# Patient Record
Sex: FEMALE | Race: WHITE | NOT HISPANIC OR LATINO | Employment: FULL TIME | URBAN - METROPOLITAN AREA
[De-identification: names, ages, dates, MRNs, and addresses within clinical notes are randomized per-mention and may not be internally consistent; named-entity substitution may affect disease eponyms.]

---

## 2017-07-19 ENCOUNTER — OFFICE VISIT (OUTPATIENT)
Dept: URGENT CARE | Facility: MEDICAL CENTER | Age: 27
End: 2017-07-19
Payer: COMMERCIAL

## 2017-07-19 PROCEDURE — 99213 OFFICE O/P EST LOW 20 MIN: CPT

## 2017-07-19 PROCEDURE — S9088 SERVICES PROVIDED IN URGENT: HCPCS

## 2017-07-31 ENCOUNTER — TRANSCRIBE ORDERS (OUTPATIENT)
Dept: LAB | Facility: HOSPITAL | Age: 27
End: 2017-07-31

## 2017-07-31 ENCOUNTER — APPOINTMENT (OUTPATIENT)
Dept: LAB | Facility: HOSPITAL | Age: 27
End: 2017-07-31
Payer: COMMERCIAL

## 2017-07-31 DIAGNOSIS — Z00.8 ENCOUNTER FOR OTHER GENERAL EXAMINATION: Primary | ICD-10-CM

## 2017-07-31 DIAGNOSIS — Z00.8 ENCOUNTER FOR OTHER GENERAL EXAMINATION: ICD-10-CM

## 2017-07-31 LAB
CHOLEST SERPL-MCNC: 175 MG/DL (ref 50–200)
EST. AVERAGE GLUCOSE BLD GHB EST-MCNC: 97 MG/DL
HBA1C MFR BLD: 5 % (ref 4.2–6.3)
HDLC SERPL-MCNC: 65 MG/DL (ref 40–60)
LDLC SERPL CALC-MCNC: 98 MG/DL (ref 0–100)
TRIGL SERPL-MCNC: 59 MG/DL

## 2017-07-31 PROCEDURE — 80061 LIPID PANEL: CPT

## 2017-07-31 PROCEDURE — 83036 HEMOGLOBIN GLYCOSYLATED A1C: CPT

## 2017-07-31 PROCEDURE — 36415 COLL VENOUS BLD VENIPUNCTURE: CPT

## 2017-09-19 ENCOUNTER — ALLSCRIPTS OFFICE VISIT (OUTPATIENT)
Dept: OTHER | Facility: OTHER | Age: 27
End: 2017-09-19

## 2017-10-26 NOTE — PROGRESS NOTES
Assessment  1  Encounter for annual routine gynecological examination (V72 31) (Q98 785)    Discussion/Summary    #1  Pap smear deferred due to low risk status, last Pap 2016 within normal limitsEncouraged self breast examination as well as calcium supplementationDiscussed the use of Lysteda  She will notify me if she would like a refillReturn to office in one year  The patient has the current Goals: To get periods lighter  The patent has the current Barriers: No barriers  Patient is able to Self-Care  Self Referrals: Yes      Chief Complaint  yearly       History of Present Illness  HPI: This is a 79-year-old female G0 who presents for annual visit  She offers no complaints today  Her menstrual cycles are regular every 4 weeks lasting 7 days with no breakthrough bleeding  She does use Motrin on a monthly basis due to severe cramping  She has use was started on the past with some improvement    She denies any changes in bowel or bladder function  She's never been sexually active  Review of Systems    Cardiovascular: no complaints of slow or fast heart rate, no chest pain, no palpitations, no leg claudication or lower extremity edema  Respiratory: no complaints of shortness of breath, no wheezing, no dyspnea on exertion, no orthopnea or PND  Breasts: no complaints of breast pain, breast lump or nipple discharge  Gastrointestinal: no complaints of abdominal pain, no constipation, no nausea or diarrhea, no vomiting, no bloody stools  Genitourinary: no complaints of dysuria, no incontinence, no pelvic pain, no dysmenorrhea, no vaginal discharge or abnormal vaginal bleeding  Over the past 2 weeks, how often have you been bothered by the following problems? 1 ) Little interest or pleasure in doing things? Not at all    2 ) Feeling down, depressed or hopeless? Not at all    3 ) Trouble falling asleep or sleeping too much? Not at all    4 ) Feeling tired or having little energy?  Not at all    5 ) Poor appetite or overeating? Not at all    6 ) Feeling bad about yourself, or that you are a failure, or have let yourself or your family down? Not at all    7 ) Trouble concentrating on things, such as reading a newspaper or watching television? Not at all    8 ) Moving or speaking so slowly that other people could have noticed, or the opposite, moving or speaking faster than usual? Not at all    9 ) Thoughts that you would be better off dead or of hurting yourself in some way? Not at all  Score 0     ROS reviewed  OB History  Pregnancy History (Brief):   Prior pregnancies: : 0  Para: Active Problems  1  Acute pharyngitis (462) (J02 9)   2  Cellulitis of right thigh (682 6) (L03 115)   3  Encounter for annual routine gynecological examination () (Z01 419)   4  Encounter for routine gynecological examination () (Z01 419)   5  Jaw pain (784 92) (R68 84)   6  Menorrhagia (626 2) (N92 0)   7  Otitis media of right ear (382 9) (H66 91)    Surgical History   · History of Oral Surgery Tooth Extraction Welch Tooth    Social History   · Never smoked   · Occupation   · [address]   1  Tranexamic Acid 650 MG Oral Tablet; TAKE 2 TABLET 3 times daily; Therapy: [id number] to (Evaluate:2017)  Requested for: 2017; Last   Rx:[id number] Ordered    Allergies  1  No Known Drug Allergies    Vitals   Recorded: 40QXY0661 79:87CA   Systolic 774   Diastolic 74   Height 5 ft 6 in   Weight 171 lb    BMI Calculated 27 6   BSA Calculated 1 87   LMP [id number]     Physical Exam    Constitutional   General appearance: No acute distress, well appearing and well nourished  Pulmonary   Auscultation of lungs: Clear to auscultation  Cardiovascular   Auscultation of heart: Normal rate and rhythm, normal S1 and S2, no murmurs  Genitourinary   External genitalia: Normal and no lesions appreciated  Vagina: Normal, no lesions or dryness appreciated      Cervix: Normal, no palpable masses  Uterus: Normal, non-tender, not enlarged, and no palpable masses  Adnexa/parametria: Normal, non-tender and no fullness or masses appreciated  Chest   Breasts: Normal and no dimpling or skin changes noted  Abdomen   Abdomen: Normal, non-tender, and no organomegaly noted         Signatures   Electronically signed by : Davina Lai DO; Sep 19 2017  2:30PM EST                       (Author)

## 2018-01-12 VITALS
WEIGHT: 171 LBS | HEIGHT: 66 IN | DIASTOLIC BLOOD PRESSURE: 74 MMHG | BODY MASS INDEX: 27.48 KG/M2 | SYSTOLIC BLOOD PRESSURE: 116 MMHG

## 2018-03-06 ENCOUNTER — OFFICE VISIT (OUTPATIENT)
Dept: URGENT CARE | Facility: MEDICAL CENTER | Age: 28
End: 2018-03-06
Payer: COMMERCIAL

## 2018-03-06 VITALS
HEART RATE: 85 BPM | SYSTOLIC BLOOD PRESSURE: 127 MMHG | OXYGEN SATURATION: 98 % | BODY MASS INDEX: 27.47 KG/M2 | HEIGHT: 67 IN | WEIGHT: 175 LBS | TEMPERATURE: 98.1 F | DIASTOLIC BLOOD PRESSURE: 76 MMHG

## 2018-03-06 DIAGNOSIS — L73.9 FOLLICULITIS: Primary | ICD-10-CM

## 2018-03-06 PROCEDURE — S9088 SERVICES PROVIDED IN URGENT: HCPCS | Performed by: PHYSICIAN ASSISTANT

## 2018-03-06 PROCEDURE — 99213 OFFICE O/P EST LOW 20 MIN: CPT | Performed by: PHYSICIAN ASSISTANT

## 2018-03-06 RX ORDER — CEPHALEXIN 500 MG/1
500 CAPSULE ORAL EVERY 8 HOURS SCHEDULED
Qty: 21 CAPSULE | Refills: 0 | Status: SHIPPED | OUTPATIENT
Start: 2018-03-06 | End: 2018-03-13

## 2018-03-06 RX ORDER — ERYTHROMYCIN AND BENZOYL PEROXIDE 30; 50 MG/G; MG/G
GEL TOPICAL 2 TIMES DAILY
Qty: 23.3 G | Refills: 0 | Status: SHIPPED | OUTPATIENT
Start: 2018-03-06 | End: 2020-10-08 | Stop reason: ALTCHOICE

## 2018-03-06 RX ORDER — TRANEXAMIC ACID 650 1/1
2 TABLET ORAL 3 TIMES DAILY
COMMUNITY
Start: 2017-02-22 | End: 2019-07-22 | Stop reason: SDUPTHER

## 2018-03-06 NOTE — PATIENT INSTRUCTIONS
Folliculitis   WHAT YOU NEED TO KNOW:   Folliculitis is inflammation of your hair follicles  A hair follicle is a sac under your skin  Your hair grows out of the follicle  Folliculitis is caused by bacteria or fungus, most commonly a germ called Staph  Folliculitis can occur anywhere you have hair  DISCHARGE INSTRUCTIONS:   Medicines:   · Antibiotics: This medicine is given to fight or prevent an infection caused by bacteria  It may be given as an ointment that you apply to your skin or as a pill  Always take your antibiotics exactly as ordered by your healthcare provider  Never save antibiotics or take leftover antibiotics that were given to you for another illness  · Antifungal medicine: This medicine helps kill fungus that may be causing your folliculitis  It may be given as an cream that you apply to your skin or as a pill  · Steroids: This medicine may be given to decrease inflammation  · NSAIDs , such as ibuprofen, help decrease swelling, pain, and fever  This medicine is available with or without a doctor's order  NSAIDs can cause stomach bleeding or kidney problems in certain people  If you take blood thinner medicine, always ask if NSAIDs are safe for you  Always read the medicine label and follow directions  Do not give these medicines to children under 10months of age without direction from your child's healthcare provider  · Antihistamines: This medicine may be given to help decrease itching  · Take your medicine as directed  Contact your healthcare provider if you think your medicine is not helping or if you have side effects  Tell him or her if you are allergic to any medicine  Keep a list of the medicines, vitamins, and herbs you take  Include the amounts, and when and why you take them  Bring the list or the pill bottles to follow-up visits  Carry your medicine list with you in case of an emergency    Follow up with your healthcare provider or dermatologist as directed:  Write down your questions so you remember to ask them during your visits  Manage folliculitis:   · Use warm compresses:  Wet a washcloth with warm water and apply it to the infected skin area to help decrease pain and swelling  Warm compresses may also help drain pus and improve healing  · Clean the area:  Use antibacterial soap to wash the affected area  Change your washcloths and towels every day  · Avoid shaving the area: If possible, do not shave areas that have folliculitis  If you must shave, use an electric razor or new blade every time you shave  Prevent folliculitis:   · Do not share personal items:  Do not share towels, soap, or any personal items with other people  · Do not wear tight clothing:  Do not wear tight-fitting clothes that rub against and irritate your skin  · Treat skin injuries right away:  Treat injuries such as cuts and scrapes right away  Wash them with warm, soapy water, and cover the area to prevent infection  Contact your healthcare provider or dermatologist if:   · You have a fever  · You have foul-smelling pus coming from the bumps on your skin  · Your rash is spreading  · You have questions or concerns about your condition or care  Seek care immediately if:  · You develop large areas of red, warm, tender skin around the folliculitis  · You develop boils  © 2017 [address] Information is for End User's use only and may not be sold, redistributed or otherwise used for commercial purposes  All illustrations and images included in CareNotes® are the copyrighted property of A D A M , Inc  or Cory Alvarez  The above information is an  only  It is not intended as medical advice for individual conditions or treatments  Talk to your doctor, nurse or pharmacist before following any medical regimen to see if it is safe and effective for you

## 2018-03-06 NOTE — PROGRESS NOTES
[facility]        NAME: Luda Tiwari is a 33 y o  female  : 1990    MRN: [id number]  DATE: 2018  TIME: 8:07 AM    Assessment and Plan   Folliculitis [C20 6]  1  Folliculitis  cephalexin (KEFLEX) 500 mg capsule    benzoyl peroxide-erythromycin (BENZAMYCIN) gel         Patient Instructions     Patient Instructions   Folliculitis   WHAT YOU NEED TO KNOW:   Folliculitis is inflammation of your hair follicles  A hair follicle is a sac under your skin  Your hair grows out of the follicle  Folliculitis is caused by bacteria or fungus, most commonly a germ called Staph  Folliculitis can occur anywhere you have hair  DISCHARGE INSTRUCTIONS:   Medicines:   · Antibiotics: This medicine is given to fight or prevent an infection caused by bacteria  It may be given as an ointment that you apply to your skin or as a pill  Always take your antibiotics exactly as ordered by your healthcare provider  Never save antibiotics or take leftover antibiotics that were given to you for another illness  · Antifungal medicine: This medicine helps kill fungus that may be causing your folliculitis  It may be given as an cream that you apply to your skin or as a pill  · Steroids: This medicine may be given to decrease inflammation  · NSAIDs , such as ibuprofen, help decrease swelling, pain, and fever  This medicine is available with or without a doctor's order  NSAIDs can cause stomach bleeding or kidney problems in certain people  If you take blood thinner medicine, always ask if NSAIDs are safe for you  Always read the medicine label and follow directions  Do not give these medicines to children under 10months of age without direction from your child's healthcare provider  · Antihistamines: This medicine may be given to help decrease itching  · Take your medicine as directed  Contact your healthcare provider if you think your medicine is not helping or if you have side effects   Tell him or her if you are allergic to any medicine  Keep a list of the medicines, vitamins, and herbs you take  Include the amounts, and when and why you take them  Bring the list or the pill bottles to follow-up visits  Carry your medicine list with you in case of an emergency  Follow up with your healthcare provider or dermatologist as directed:  Write down your questions so you remember to ask them during your visits  Manage folliculitis:   · Use warm compresses:  Wet a washcloth with warm water and apply it to the infected skin area to help decrease pain and swelling  Warm compresses may also help drain pus and improve healing  · Clean the area:  Use antibacterial soap to wash the affected area  Change your washcloths and towels every day  · Avoid shaving the area: If possible, do not shave areas that have folliculitis  If you must shave, use an electric razor or new blade every time you shave  Prevent folliculitis:   · Do not share personal items:  Do not share towels, soap, or any personal items with other people  · Do not wear tight clothing:  Do not wear tight-fitting clothes that rub against and irritate your skin  · Treat skin injuries right away:  Treat injuries such as cuts and scrapes right away  Wash them with warm, soapy water, and cover the area to prevent infection  Contact your healthcare provider or dermatologist if:   · You have a fever  · You have foul-smelling pus coming from the bumps on your skin  · Your rash is spreading  · You have questions or concerns about your condition or care  Seek care immediately if:  · You develop large areas of red, warm, tender skin around the folliculitis  · You develop boils  © 2017 [address] Information is for End User's use only and may not be sold, redistributed or otherwise used for commercial purposes   All illustrations and images included in CareNotes® are the copyrighted property of A D [employer]  or [employer]  The above information is an  only  It is not intended as medical advice for individual conditions or treatments  Talk to your doctor, nurse or pharmacist before following any medical regimen to see if it is safe and effective for you  Follow up with PCP in 3-5 days  Proceed to  ER if symptoms worsen  Chief Complaint     Chief Complaint   Patient presents with    Earache     Right ear pain for 4 days  History of Present Illness   Julito Tavarezsalbador presents to the clinic c/o    33 y o female who complains of right sided ear pain X 4 days  She denies any ear discharge or bleeding  She denies any hearing loss  She has never had this occur before She denies any fevers, chills, shortness of breath, difficulty breathing, chest pain, abdominal, nausea,/diarrhea  She denies any sore throat  Earache          Review of Systems   Review of Systems   Constitutional: Negative  HENT: Positive for ear pain  Respiratory: Negative  Cardiovascular: Negative  Current Medications     Long-Term Prescriptions   Medication Sig Dispense Refill    benzoyl peroxide-erythromycin (BENZAMYCIN) gel Apply topically 2 (two) times a day 23 3 g 0       Current Allergies     Allergies as of 03/06/2018    (No Known Allergies)            The following portions of the patient's history were reviewed and updated as appropriate: allergies, current medications, past family history, past medical history, past social history, past surgical history and problem list     Objective   /76 (BP Location: Right arm, Patient Position: Sitting, Cuff Size: Standard)   Pulse 85   Temp 98 1 °F (36 7 °C) (Tympanic)   Ht 5' 7" (1 702 m)   Wt 79 4 kg (175 lb)   SpO2 98%   BMI 27 41 kg/m²        Physical Exam     Physical Exam   Constitutional: She is oriented to person, place, and time  She appears well-developed and well-nourished  HENT:   Head: Normocephalic and atraumatic     Right Ear: Tympanic membrane and ear canal normal    Left Ear: Tympanic membrane, external ear and ear canal normal    Ears:    Nose: Nose normal    Mouth/Throat: Oropharynx is clear and moist    Eyes: Conjunctivae and EOM are normal  Pupils are equal, round, and reactive to light  Right eye exhibits no discharge  Left eye exhibits no discharge  Neck: Normal range of motion  Neck supple  No tracheal deviation present  Cardiovascular: Normal rate, regular rhythm and normal heart sounds  Exam reveals no gallop and no friction rub  No murmur heard  Pulmonary/Chest: Effort normal and breath sounds normal  No stridor  No respiratory distress  She has no wheezes  She has no rales  She exhibits no tenderness  Abdominal: Soft  Bowel sounds are normal  There is no rebound and no guarding  Musculoskeletal: Normal range of motion  Lymphadenopathy:     She has no cervical adenopathy  Neurological: She is alert and oriented to person, place, and time  Skin: Skin is warm and dry  Psychiatric: She has a normal mood and affect  Her behavior is normal    Nursing note and vitals reviewed

## 2018-07-13 ENCOUNTER — APPOINTMENT (OUTPATIENT)
Dept: LAB | Facility: HOSPITAL | Age: 28
End: 2018-07-13
Payer: COMMERCIAL

## 2018-07-13 ENCOUNTER — TRANSCRIBE ORDERS (OUTPATIENT)
Dept: LAB | Facility: HOSPITAL | Age: 28
End: 2018-07-13

## 2018-07-13 DIAGNOSIS — Z00.8 HEALTH EXAMINATION IN POPULATION SURVEY: Primary | ICD-10-CM

## 2018-07-13 DIAGNOSIS — Z00.8 HEALTH EXAMINATION IN POPULATION SURVEY: ICD-10-CM

## 2018-07-13 LAB
CHOLEST SERPL-MCNC: 189 MG/DL (ref 50–200)
EST. AVERAGE GLUCOSE BLD GHB EST-MCNC: 82 MG/DL
HBA1C MFR BLD: 4.5 % (ref 4.2–6.3)
HDLC SERPL-MCNC: 67 MG/DL (ref 40–60)
LDLC SERPL CALC-MCNC: 113 MG/DL (ref 0–100)
NONHDLC SERPL-MCNC: 122 MG/DL
TRIGL SERPL-MCNC: 44 MG/DL

## 2018-07-13 PROCEDURE — 83036 HEMOGLOBIN GLYCOSYLATED A1C: CPT

## 2018-07-13 PROCEDURE — 80061 LIPID PANEL: CPT

## 2018-07-13 PROCEDURE — 36415 COLL VENOUS BLD VENIPUNCTURE: CPT

## 2018-10-01 ENCOUNTER — ANNUAL EXAM (OUTPATIENT)
Dept: OBGYN CLINIC | Facility: CLINIC | Age: 28
End: 2018-10-01
Payer: COMMERCIAL

## 2018-10-01 VITALS
BODY MASS INDEX: 26.37 KG/M2 | DIASTOLIC BLOOD PRESSURE: 64 MMHG | WEIGHT: 168 LBS | HEIGHT: 67 IN | SYSTOLIC BLOOD PRESSURE: 106 MMHG

## 2018-10-01 DIAGNOSIS — Z01.419 ENCOUNTER FOR ANNUAL ROUTINE GYNECOLOGICAL EXAMINATION: Primary | ICD-10-CM

## 2018-10-01 PROCEDURE — 99395 PREV VISIT EST AGE 18-39: CPT | Performed by: OBSTETRICS & GYNECOLOGY

## 2018-10-01 PROCEDURE — G0143 SCR C/V CYTO,THINLAYER,RESCR: HCPCS | Performed by: OBSTETRICS & GYNECOLOGY

## 2018-10-01 NOTE — PROGRESS NOTES
Assessment/Plan:    Pap smear done, due to insufficient cells on last Pap smear  Encouraged self-breast examination as well as calcium supplementation  Discussed treatment options for menorrhagia/dysmenorrhea  Risks and benefits of OCPs reviewed  She will notify me if she is interested otherwise she may continue to use lysteda p r n  Gary Jackson Return to office in 1 year  No problem-specific Assessment & Plan notes found for this encounter  Diagnoses and all orders for this visit:    Encounter for annual routine gynecological examination          Subjective:      Patient ID: Malathi Bell is a 33 y o  female  HPI     This is a 39-year-old female G0 who presents for her annual gyn exam   She offers no complaints today  Her menstrual cycles are regular every 4 weeks lasting 7 days  She states her cycles are heavy as well as discomfort where she is using Motrin on a monthly basis  She has use lysteda in the last year with much success  She would only use this agent 2 to 3 times a year  She does use tampons regularly  Patient has never been sexually active  She denies any changes in bowel or bladder function  She did receive the Gardasil vaccine  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  The following portions of the patient's history were reviewed and updated as appropriate: allergies, current medications, past family history, past medical history, past social history, past surgical history and problem list     Review of Systems   Constitutional: Negative for fatigue, fever and unexpected weight change  Respiratory: Negative for cough, chest tightness, shortness of breath and wheezing  Cardiovascular: Negative  Negative for chest pain and palpitations  Gastrointestinal: Negative  Negative for abdominal distention, abdominal pain, blood in stool, constipation, diarrhea, nausea and vomiting  Genitourinary: Negative    Negative for difficulty urinating, dyspareunia, dysuria, flank pain, frequency, genital sores, hematuria, pelvic pain, urgency, vaginal bleeding, vaginal discharge and vaginal pain  Skin: Negative for rash  Objective:      /64   Ht 5' 7" (1 702 m)   Wt 76 2 kg (168 lb)   LMP 09/21/2018 (Exact Date)   Breastfeeding? No   BMI 26 31 kg/m²          Physical Exam   Constitutional: She appears well-developed and well-nourished  Cardiovascular: Normal rate and regular rhythm  Pulmonary/Chest: Effort normal and breath sounds normal  Right breast exhibits no inverted nipple, no mass, no nipple discharge, no skin change and no tenderness  Left breast exhibits no inverted nipple, no mass, no nipple discharge, no skin change and no tenderness  Abdominal: Soft  Bowel sounds are normal  She exhibits no distension  There is no tenderness  There is no rebound and no guarding  Genitourinary: Vagina normal and uterus normal  There is no lesion on the right labia  There is no lesion on the left labia  Cervix exhibits no discharge and no friability  Right adnexum displays no mass, no tenderness and no fullness  Left adnexum displays no mass, no tenderness and no fullness  No vaginal discharge found  Genitourinary Comments: Cervix is difficult to visualize due to patient discomfort despite using pediatric speculum

## 2018-10-04 LAB
LAB AP GYN PRIMARY INTERPRETATION: NORMAL
LAB AP LMP: NORMAL
Lab: NORMAL

## 2019-07-22 DIAGNOSIS — N92.0 MENORRHAGIA WITH REGULAR CYCLE: Primary | ICD-10-CM

## 2019-07-22 RX ORDER — TRANEXAMIC ACID 650 1/1
2 TABLET ORAL 3 TIMES DAILY
Qty: 30 TABLET | Refills: 0 | Status: SHIPPED | OUTPATIENT
Start: 2019-07-22 | End: 2020-10-08 | Stop reason: ALTCHOICE

## 2019-07-22 NOTE — TELEPHONE ENCOUNTER
Patient is calling requesting a refill for her Lysteta medication  Patient stated she was last in to see Dr Luisa Alcocer a few months ago and was told when her medication runs out to give a call and it will be refilled       Patient is on vacation in [facility] and is requesting it be sent to the [facility] number to call back to would be [phone]

## 2019-07-22 NOTE — TELEPHONE ENCOUNTER
Please sign off on presc for Lucasa to [facility], [place]) - pt on vacation & started menses

## 2020-10-08 ENCOUNTER — OFFICE VISIT (OUTPATIENT)
Dept: FAMILY MEDICINE CLINIC | Facility: CLINIC | Age: 30
End: 2020-10-08
Payer: COMMERCIAL

## 2020-10-08 VITALS
TEMPERATURE: 98 F | WEIGHT: 180 LBS | SYSTOLIC BLOOD PRESSURE: 108 MMHG | DIASTOLIC BLOOD PRESSURE: 74 MMHG | HEIGHT: 66 IN | HEART RATE: 88 BPM | BODY MASS INDEX: 28.93 KG/M2 | OXYGEN SATURATION: 98 %

## 2020-10-08 DIAGNOSIS — Z23 NEED FOR TDAP VACCINATION: ICD-10-CM

## 2020-10-08 DIAGNOSIS — Z00.00 ANNUAL PHYSICAL EXAM: Primary | ICD-10-CM

## 2020-10-08 DIAGNOSIS — Z13.220 SCREENING FOR LIPID DISORDERS: ICD-10-CM

## 2020-10-08 DIAGNOSIS — G43.809 OTHER MIGRAINE WITHOUT STATUS MIGRAINOSUS, NOT INTRACTABLE: ICD-10-CM

## 2020-10-08 DIAGNOSIS — Z13.29 SCREENING FOR THYROID DISORDER: ICD-10-CM

## 2020-10-08 DIAGNOSIS — Z13.0 SCREENING FOR DEFICIENCY ANEMIA: ICD-10-CM

## 2020-10-08 DIAGNOSIS — Z13.1 SCREENING FOR DIABETES MELLITUS: ICD-10-CM

## 2020-10-08 DIAGNOSIS — Z76.89 ENCOUNTER TO ESTABLISH CARE: ICD-10-CM

## 2020-10-08 PROCEDURE — 99385 PREV VISIT NEW AGE 18-39: CPT | Performed by: NURSE PRACTITIONER

## 2020-10-08 PROCEDURE — 90471 IMMUNIZATION ADMIN: CPT | Performed by: NURSE PRACTITIONER

## 2020-10-08 PROCEDURE — 90715 TDAP VACCINE 7 YRS/> IM: CPT | Performed by: NURSE PRACTITIONER

## 2020-10-08 RX ORDER — IBUPROFEN 800 MG/1
800 TABLET ORAL EVERY 8 HOURS PRN
Qty: 30 TABLET | Refills: 1 | Status: SHIPPED | OUTPATIENT
Start: 2020-10-08 | End: 2022-03-10 | Stop reason: SDUPTHER

## 2020-10-08 RX ORDER — SUMATRIPTAN 50 MG/1
50 TABLET, FILM COATED ORAL ONCE AS NEEDED
Qty: 9 TABLET | Refills: 0 | Status: SHIPPED | OUTPATIENT
Start: 2020-10-08

## 2020-11-02 ENCOUNTER — ANNUAL EXAM (OUTPATIENT)
Dept: OBGYN CLINIC | Facility: CLINIC | Age: 30
End: 2020-11-02
Payer: COMMERCIAL

## 2020-11-02 VITALS
TEMPERATURE: 98.3 F | WEIGHT: 184 LBS | HEIGHT: 66 IN | SYSTOLIC BLOOD PRESSURE: 120 MMHG | DIASTOLIC BLOOD PRESSURE: 72 MMHG | BODY MASS INDEX: 29.57 KG/M2

## 2020-11-02 DIAGNOSIS — Z01.419 ENCOUNTER FOR ANNUAL ROUTINE GYNECOLOGICAL EXAMINATION: Primary | ICD-10-CM

## 2020-11-02 DIAGNOSIS — N92.0 MENORRHAGIA WITH REGULAR CYCLE: ICD-10-CM

## 2020-11-02 PROCEDURE — 99395 PREV VISIT EST AGE 18-39: CPT | Performed by: OBSTETRICS & GYNECOLOGY

## 2020-11-02 PROCEDURE — G0145 SCR C/V CYTO,THINLAYER,RESCR: HCPCS | Performed by: OBSTETRICS & GYNECOLOGY

## 2020-11-02 RX ORDER — TRANEXAMIC ACID 650 1/1
650 TABLET ORAL 3 TIMES DAILY
Qty: 30 TABLET | Refills: 4 | Status: SHIPPED | OUTPATIENT
Start: 2020-11-02 | End: 2020-11-07

## 2020-11-09 LAB
LAB AP GYN PRIMARY INTERPRETATION: NORMAL
Lab: NORMAL

## 2020-12-30 ENCOUNTER — IMMUNIZATIONS (OUTPATIENT)
Dept: FAMILY MEDICINE CLINIC | Facility: HOSPITAL | Age: 30
End: 2020-12-30
Payer: COMMERCIAL

## 2020-12-30 DIAGNOSIS — Z23 ENCOUNTER FOR IMMUNIZATION: ICD-10-CM

## 2020-12-30 PROCEDURE — 91301 SARS-COV-2 / COVID-19 MRNA VACCINE (MODERNA) 100 MCG: CPT

## 2020-12-30 PROCEDURE — 0011A SARS-COV-2 / COVID-19 MRNA VACCINE (MODERNA) 100 MCG: CPT

## 2021-01-25 ENCOUNTER — IMMUNIZATIONS (OUTPATIENT)
Dept: FAMILY MEDICINE CLINIC | Facility: HOSPITAL | Age: 31
End: 2021-01-25

## 2021-01-25 DIAGNOSIS — Z23 ENCOUNTER FOR IMMUNIZATION: Primary | ICD-10-CM

## 2021-01-25 PROCEDURE — 0012A SARS-COV-2 / COVID-19 MRNA VACCINE (MODERNA) 100 MCG: CPT

## 2021-01-25 PROCEDURE — 91301 SARS-COV-2 / COVID-19 MRNA VACCINE (MODERNA) 100 MCG: CPT

## 2021-01-26 ENCOUNTER — TELEMEDICINE (OUTPATIENT)
Dept: FAMILY MEDICINE CLINIC | Facility: CLINIC | Age: 31
End: 2021-01-26
Payer: COMMERCIAL

## 2021-01-26 DIAGNOSIS — B34.9 VIRAL INFECTION, UNSPECIFIED: Primary | ICD-10-CM

## 2021-01-26 PROCEDURE — 99214 OFFICE O/P EST MOD 30 MIN: CPT | Performed by: FAMILY MEDICINE

## 2021-01-26 NOTE — PROGRESS NOTES
COVID-19 Virtual Visit     Assessment/Plan:    Problem List Items Addressed This Visit        Other    Viral infection, unspecified - Primary     Patient with flu-like symptoms  Check for COVID-19, RSV and influenza  Increase fluid intake and get plenty of rest     If you have nasal congestions, post nasal drip, sinus pressure, runny nose you may try the following:        Clearing your sinuses in a nice steamy shower or in bathroom filled with steamy air  Nasal saline rinses every 1-2 hours while awake may also help decrease nasal congestion, drainage  You may try Mucinex D 12 hour version  1/2 to 1 tablet one to two times a day as needed for nasal congestion, runny nose, post nasal drip, or cough  If you have sore / scratch / irritated throat, you may try the following:          Warm salt water gargles every 1-2 hours while awake, throat lozenges, Tylenol and/or ibuprofen  Most upper respiratory symptoms start to improve after 7-10 days but may take a few weeks to completely resolve  You may also use Ibuprofen or Acetaminophen containing product for symptom relief  Follow up in 1 week if your symptoms persist or worsen  Please call the office if you have any questions  The patient verbalized understanding of treatment plan  Relevant Orders    Novel Coronavirus 2019(COVID-19), Influenza A/B, RSV [facility] Collected at [facility] or [facility]         Disposition:     I referred patient to one of our centralized sites for a COVID-19 swab  I have spent 10 minutes directly with the patient  Greater than 50% of this time was spent in counseling/coordination of care regarding: prognosis, risks and benefits of treatment options, instructions for management, patient and family education, importance of treatment compliance, risk factor reductions and impressions          Encounter provider Nga Gray DO    Provider located at [address] [place] [address] [facility]  [address]  [facility] [id number] [phone]    Recent Visits  No visits were found meeting these conditions  Showing recent visits within past 7 days and meeting all other requirements     Today's Visits  Date Type Provider Dept   01/26/21 Telemedicine Bennettlety Tan  Pg [id number] Mare Cervantes today's visits and meeting all other requirements     Future Appointments  No visits were found meeting these conditions  Showing future appointments within next 150 days and meeting all other requirements      This virtual check-in was done via [facility] and patient was informed that this is a secure, HIPAA-compliant platform  She agrees to proceed  Patient agrees to participate in a virtual check in via telephone or video visit instead of presenting to the office to address urgent/immediate medical needs  Patient is aware this is a billable service  After connecting through [facility], the patient was identified by name and date of birth  Wero Ackerman was informed that this was a telemedicine visit and that the exam was being conducted confidentially over secure lines  My office door was closed  No one else was in the room  Wero Ackerman acknowledged consent and understanding of privacy and security of the telemedicine visit  I informed the patient that I have reviewed her record in Epic and presented the opportunity for her to ask any questions regarding the visit today  The patient agreed to participate  Subjective:   Wero Ackerman is a 31 y o  female who is concerned about COVID-19  Patient's symptoms include fever, chills, fatigue, nasal congestion, rhinorrhea, nausea, diarrhea and headache  Patient denies anosmia, loss of taste, cough, shortness of breath, chest tightness and vomiting       Date of symptom onset: 1/25/2021    Exposure:   Contact with a person who is under investigation (PUI) for or who is positive for COVID-19 within the last 14 days?: Yes    Hospitalized recently for fever and/or lower respiratory symptoms?: No      Currently a healthcare worker that is involved in direct patient care?: No      Works in a special setting where the risk of COVID-19 transmission may be high? (this may include long-term care, correctional and FPC facilities; homeless shelters; assisted-living facilities and group homes ): No      Resident in a special setting where the risk of COVID-19 transmission may be high? 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  No results found for: Melbarbra Harding, [address] [place] [address], [address] [place] [address],[address] [address]  History reviewed  No pertinent past medical history  Past Surgical History:   Procedure Laterality Date    WISDOM TOOTH EXTRACTION       Current Outpatient Medications   Medication Sig Dispense Refill    ibuprofen (MOTRIN) 800 mg tablet Take 1 tablet (800 mg total) by mouth every 8 (eight) hours as needed for mild pain or moderate pain 30 tablet 1    SUMAtriptan (IMITREX) 50 mg tablet Take 1 tablet (50 mg total) by mouth once as needed for migraine for up to 1 dose 9 tablet 0     No current facility-administered medications for this visit  No Known Allergies    Review of Systems   Constitutional: Positive for chills, fatigue and fever  HENT: Positive for congestion and rhinorrhea  Respiratory: Negative for cough, chest tightness and shortness of breath  Gastrointestinal: Positive for diarrhea and nausea   Negative for vomiting  Neurological: Positive for headaches  Objective: There were no vitals filed for this visit  Physical Exam  Constitutional:       General: She is not in acute distress  Appearance: She is ill-appearing  HENT:      Head: Normocephalic and atraumatic  Pulmonary:      Effort: Pulmonary effort is normal    Neurological:      General: No focal deficit present  Mental Status: She is alert and oriented to person, place, and time  Psychiatric:         Mood and Affect: Mood normal          Behavior: Behavior normal          Thought Content: Thought content normal        VIRTUAL VISIT DISCLAIMER    Shruthi Alcantar acknowledges that she has consented to an online visit or consultation  She understands that the online visit is based solely on information provided by her, and that, in the absence of a face-to-face physical evaluation by the physician, the diagnosis she receives is both limited and provisional in terms of accuracy and completeness  This is not intended to replace a full medical face-to-face evaluation by the physician  Joan Alcantar understands and accepts these terms

## 2021-01-26 NOTE — ASSESSMENT & PLAN NOTE
Patient with flu-like symptoms  Check for COVID-19, RSV and influenza  Increase fluid intake and get plenty of rest     If you have nasal congestions, post nasal drip, sinus pressure, runny nose you may try the following:        Clearing your sinuses in a nice steamy shower or in bathroom filled with steamy air  Nasal saline rinses every 1-2 hours while awake may also help decrease nasal congestion, drainage  You may try Mucinex D 12 hour version  1/2 to 1 tablet one to two times a day as needed for nasal congestion, runny nose, post nasal drip, or cough  If you have sore / scratch / irritated throat, you may try the following:          Warm salt water gargles every 1-2 hours while awake, throat lozenges, Tylenol and/or ibuprofen  Most upper respiratory symptoms start to improve after 7-10 days but may take a few weeks to completely resolve  You may also use Ibuprofen or Acetaminophen containing product for symptom relief  Follow up in 1 week if your symptoms persist or worsen  Please call the office if you have any questions  The patient verbalized understanding of treatment plan

## 2021-01-27 DIAGNOSIS — B34.9 VIRAL INFECTION, UNSPECIFIED: ICD-10-CM

## 2021-01-27 LAB
FLUAV RNA RESP QL NAA+PROBE: NEGATIVE
FLUBV RNA RESP QL NAA+PROBE: NEGATIVE
RSV RNA RESP QL NAA+PROBE: NEGATIVE
SARS-COV-2 RNA RESP QL NAA+PROBE: NEGATIVE

## 2021-01-27 PROCEDURE — 0241U HB NFCT DS VIR RESP RNA 4 TRGT: CPT

## 2021-01-28 PROCEDURE — 88304 TISSUE EXAM BY PATHOLOGIST: CPT | Performed by: PATHOLOGY

## 2021-01-29 ENCOUNTER — LAB REQUISITION (OUTPATIENT)
Dept: LAB | Facility: HOSPITAL | Age: 31
End: 2021-01-29
Payer: COMMERCIAL

## 2021-01-29 DIAGNOSIS — L72.11 PILAR CYST: ICD-10-CM

## 2021-04-09 ENCOUNTER — LAB REQUISITION (OUTPATIENT)
Dept: LAB | Facility: HOSPITAL | Age: 31
End: 2021-04-09
Payer: COMMERCIAL

## 2021-04-09 DIAGNOSIS — L72.11 PILAR CYST: ICD-10-CM

## 2021-04-09 PROCEDURE — 88304 TISSUE EXAM BY PATHOLOGIST: CPT | Performed by: PATHOLOGY

## 2021-05-17 DIAGNOSIS — N92.0 MENORRHAGIA WITH REGULAR CYCLE: Primary | ICD-10-CM

## 2021-05-17 RX ORDER — TRANEXAMIC ACID 650 1/1
650 TABLET ORAL 3 TIMES DAILY
Qty: 30 TABLET | Refills: 5 | Status: SHIPPED | OUTPATIENT
Start: 2021-05-17 | End: 2022-03-01 | Stop reason: SDUPTHER

## 2021-08-03 ENCOUNTER — APPOINTMENT (OUTPATIENT)
Dept: LAB | Facility: HOSPITAL | Age: 31
End: 2021-08-03

## 2021-08-03 DIAGNOSIS — Z00.8 HEALTH EXAMINATION IN POPULATION SURVEY: ICD-10-CM

## 2021-08-03 LAB
CHOLEST SERPL-MCNC: 207 MG/DL (ref 50–200)
EST. AVERAGE GLUCOSE BLD GHB EST-MCNC: 88 MG/DL
HBA1C MFR BLD: 4.7 %
HDLC SERPL-MCNC: 69 MG/DL
LDLC SERPL CALC-MCNC: 126 MG/DL (ref 0–100)
NONHDLC SERPL-MCNC: 138 MG/DL
TRIGL SERPL-MCNC: 58 MG/DL

## 2021-08-03 PROCEDURE — 36415 COLL VENOUS BLD VENIPUNCTURE: CPT

## 2021-08-03 PROCEDURE — 80061 LIPID PANEL: CPT

## 2021-08-03 PROCEDURE — 83036 HEMOGLOBIN GLYCOSYLATED A1C: CPT

## 2022-01-11 PROCEDURE — U0005 INFEC AGEN DETEC AMPLI PROBE: HCPCS | Performed by: NURSE PRACTITIONER

## 2022-01-11 PROCEDURE — U0003 INFECTIOUS AGENT DETECTION BY NUCLEIC ACID (DNA OR RNA); SEVERE ACUTE RESPIRATORY SYNDROME CORONAVIRUS 2 (SARS-COV-2) (CORONAVIRUS DISEASE [COVID-19]), AMPLIFIED PROBE TECHNIQUE, MAKING USE OF HIGH THROUGHPUT TECHNOLOGIES AS DESCRIBED BY CMS-2020-01-R: HCPCS | Performed by: NURSE PRACTITIONER

## 2022-01-14 ENCOUNTER — OFFICE VISIT (OUTPATIENT)
Dept: FAMILY MEDICINE CLINIC | Facility: CLINIC | Age: 32
End: 2022-01-14
Payer: COMMERCIAL

## 2022-01-14 VITALS
TEMPERATURE: 99.5 F | HEART RATE: 90 BPM | DIASTOLIC BLOOD PRESSURE: 70 MMHG | SYSTOLIC BLOOD PRESSURE: 112 MMHG | WEIGHT: 186.6 LBS | BODY MASS INDEX: 29.99 KG/M2 | OXYGEN SATURATION: 97 % | HEIGHT: 66 IN

## 2022-01-14 DIAGNOSIS — Z01.84 IMMUNITY STATUS TESTING: ICD-10-CM

## 2022-01-14 DIAGNOSIS — N92.0 MENORRHAGIA WITH REGULAR CYCLE: ICD-10-CM

## 2022-01-14 DIAGNOSIS — Z13.1 SCREENING FOR DIABETES MELLITUS: ICD-10-CM

## 2022-01-14 DIAGNOSIS — Z11.1 SCREENING FOR TUBERCULOSIS: ICD-10-CM

## 2022-01-14 DIAGNOSIS — Z13.0 SCREENING FOR DEFICIENCY ANEMIA: ICD-10-CM

## 2022-01-14 DIAGNOSIS — Z00.00 ANNUAL PHYSICAL EXAM: Primary | ICD-10-CM

## 2022-01-14 DIAGNOSIS — Z13.29 SCREENING FOR THYROID DISORDER: ICD-10-CM

## 2022-01-14 PROCEDURE — 99395 PREV VISIT EST AGE 18-39: CPT | Performed by: NURSE PRACTITIONER

## 2022-01-14 NOTE — PROGRESS NOTES
ADULT ANNUAL [address] [facility]    NAME: Faye Alcantar  AGE: 27 y o  SEX: female  : 1990     DATE: 2022     Assessment and Plan:   Comprehensive physical exam  PMH and medications reviewed  Preventative care and recommended screenings reviewed  Immunizations up to date  Entering clinicals for FNP program - through [facility]  Needs titers - ordered as below  Will add routine labwork orders   Will complete paperwork once results received  Booster as needed  Continue annual physicals  F/U sooner as needed  Problem List Items Addressed This Visit     None      Visit Diagnoses     Annual physical exam    -  Primary    Immunity status testing        Relevant Orders    Rubeola antibody IgG    Rubella antibody, IgG    Varicella zoster antibody, IgG    Mumps antibody, IgG    Hepatitis B surface antibody    Hepatitis B surface antigen    Screening for tuberculosis        Relevant Orders    Quantiferon TB Gold Plus    Screening for diabetes mellitus        Relevant Orders    Comprehensive metabolic panel    Screening for thyroid disorder        Relevant Orders    TSH, 3rd generation with Free T4 reflex    Menorrhagia with regular cycle        Relevant Orders    Iron Panel (Includes Ferritin, Iron Sat%, Iron, and TIBC)    Screening for deficiency anemia        Relevant Orders    Iron Panel (Includes Ferritin, Iron Sat%, Iron, and TIBC)          Immunizations and preventive care screenings were discussed with patient today  Appropriate education was printed on patient's after visit summary  Counseling:  Alcohol/drug use: discussed moderation in alcohol intake, the recommendations for healthy alcohol use, and avoidance of illicit drug use  Dental Health: discussed importance of regular tooth brushing, flossing, and dental visits    Injury prevention: discussed safety/seat belts, safety helmets, smoke detectors, carbon dioxide detectors, and smoking near bedding or upholstery  Sexual health: discussed sexually transmitted diseases, partner selection, use of condoms, avoidance of unintended pregnancy, and contraceptive alternatives  · Exercise: the importance of regular exercise/physical activity was discussed  Recommend exercise 3-5 times per week for at least 30 minutes  BMI Counseling: Body mass index is 30 12 kg/m²  The BMI is above normal  Nutrition recommendations include decreasing portion sizes, consuming healthier snacks, moderation in carbohydrate intake, increasing intake of lean protein and reducing intake of saturated and trans fat  Exercise recommendations include moderate physical activity 150 minutes/week  Rationale for BMI follow-up plan is due to patient being overweight or obese  Depression Screening and Follow-up Plan: Patient was screened for depression during today's encounter  They screened negative with a PHQ-2 score of 0  Return in about 1 year (around 1/14/2023) for Annual physical      Chief Complaint:     Chief Complaint   Patient presents with    Physical Exam     Physical exam, needs PW completed for [facility]      History of Present Illness:     Adult Annual Physical   Patient here for a comprehensive physical exam  The patient reports no problems  Diet and Physical Activity  · Diet/Nutrition: well balanced diet  · Exercise: moderate cardiovascular exercise  Depression Screening  PHQ-2/9 Depression Screening    Little interest or pleasure in doing things: 0 - not at all  Feeling down, depressed, or hopeless: 0 - not at all  PHQ-2 Score: 0  PHQ-2 Interpretation: Negative depression screen       General Health  · Sleep: sleeps well  · Hearing: normal - bilateral   · Vision: no vision problems, goes for regular eye exams, most recent eye exam >1 year ago, wears contacts and in office Snellen: 20/20 OU  · Dental: regular dental visits         /GYN Health  · Last menstrual period: 1/13/2022  · Contraceptive method: no   · History of STDs?: no   · Women's health: Follows with Gyn  · PAP/HPV (-) 2020  · Encourage self breast exams     Review of Systems:     Review of Systems   Constitutional: Negative  HENT: Negative  Respiratory: Negative  Cardiovascular: Negative  Gastrointestinal: Negative  Genitourinary: Negative  Musculoskeletal: Negative  Neurological: Negative  Psychiatric/Behavioral: Negative  Past Medical History:     Past Medical History:   Diagnosis Date    Headache(784 0)     Middle school    Otitis media     Visual impairment     Wears contacts      Past Surgical History:     Past Surgical History:   Procedure Laterality Date    WISDOM TOOTH EXTRACTION        Social History:     Social History     Socioeconomic History    Marital status: Single     Spouse name: None    Number of children: None    Years of education: None    Highest education level: None   Occupational History    Occupation: Registered nurse   Tobacco Use    Smoking status: Never Smoker    Smokeless tobacco: Never Used   Substance and Sexual Activity    Alcohol use:  Yes     Alcohol/week: 0 0 standard drinks     Comment: Once or twice a month    Drug use: Never    Sexual activity: Never   Other Topics Concern    None   Social History Narrative    None     Social Determinants of Health     Financial Resource Strain: Not on file   Food Insecurity: Not on file   Transportation Needs: Not on file   Physical Activity: Not on file   Stress: Not on file   Social Connections: Not on file   Intimate Partner Violence: Not on file   Housing Stability: Not on file      Family History:     Family History   Problem Relation Age of Onset    Heart attack Maternal Grandmother     Heart attack Maternal Grandfather     Hyperlipidemia Mother     Cancer Mother         Breast; leukemia      Current Medications:     Current Outpatient Medications   Medication Sig Dispense Refill    ibuprofen (MOTRIN) 800 mg tablet Take 1 tablet (800 mg total) by mouth every 8 (eight) hours as needed for mild pain or moderate pain 30 tablet 1    SUMAtriptan (IMITREX) 50 mg tablet Take 1 tablet (50 mg total) by mouth once as needed for migraine for up to 1 dose 9 tablet 0    Tranexamic Acid 650 MG TABS Take 1 tablet (650 mg total) by mouth 3 (three) times a day 30 tablet 5     No current facility-administered medications for this visit  Allergies:     No Known Allergies   Physical Exam:     /70 (BP Location: Left arm, Patient Position: Sitting, Cuff Size: Adult)   Pulse 90   Temp 99 5 °F (37 5 °C)   Ht 5' 6" (1 676 m)   Wt 84 6 kg (186 lb 9 6 oz)   LMP 01/13/2022 (Exact Date)   SpO2 97%   BMI 30 12 kg/m²     Physical Exam  Vitals and nursing note reviewed  Constitutional:       General: She is not in acute distress  Appearance: Normal appearance  She is well-developed and well-groomed  She is not ill-appearing  HENT:      Head: Normocephalic and atraumatic  Right Ear: Hearing, tympanic membrane, ear canal and external ear normal       Left Ear: Hearing, tympanic membrane, ear canal and external ear normal       Nose: Nose normal       Mouth/Throat:      Lips: Pink  Pharynx: Oropharynx is clear  Eyes:      Extraocular Movements: Extraocular movements intact  Conjunctiva/sclera: Conjunctivae normal       Pupils: Pupils are equal, round, and reactive to light  Neck:      Thyroid: No thyromegaly  Vascular: No carotid bruit  Cardiovascular:      Rate and Rhythm: Normal rate and regular rhythm  Pulses:           Posterior tibial pulses are 2+ on the right side and 2+ on the left side  Heart sounds: Normal heart sounds  Pulmonary:      Effort: Pulmonary effort is normal  No respiratory distress  Breath sounds: Normal breath sounds and air entry  Abdominal:      General: Abdomen is flat  Bowel sounds are normal       Palpations: Abdomen is soft  Tenderness: There is no abdominal tenderness  There is no right CVA tenderness or left CVA tenderness  Musculoskeletal:      Cervical back: Full passive range of motion without pain  Right lower leg: No edema  Left lower leg: No edema  Lymphadenopathy:      Cervical: No cervical adenopathy  Skin:     General: Skin is warm and dry  Neurological:      General: No focal deficit present  Mental Status: She is alert and oriented to person, place, and time  Cranial Nerves: Cranial nerves are intact  Sensory: Sensation is intact  Motor: Motor function is intact  Coordination: Coordination is intact  Gait: Gait is intact  Psychiatric:         Attention and Perception: Attention and perception normal          Mood and Affect: Mood and affect normal          Speech: Speech normal          Behavior: Behavior normal          Thought Content:  Thought content normal           CHRIS Vaughan   [address] [facility]

## 2022-01-14 NOTE — PATIENT INSTRUCTIONS

## 2022-01-15 ENCOUNTER — APPOINTMENT (OUTPATIENT)
Dept: LAB | Facility: HOSPITAL | Age: 32
End: 2022-01-15
Payer: COMMERCIAL

## 2022-01-15 DIAGNOSIS — Z11.1 SCREENING FOR TUBERCULOSIS: ICD-10-CM

## 2022-01-15 DIAGNOSIS — Z13.0 SCREENING FOR DEFICIENCY ANEMIA: ICD-10-CM

## 2022-01-15 DIAGNOSIS — N92.0 MENORRHAGIA WITH REGULAR CYCLE: ICD-10-CM

## 2022-01-15 DIAGNOSIS — Z13.29 SCREENING FOR THYROID DISORDER: ICD-10-CM

## 2022-01-15 DIAGNOSIS — Z13.1 SCREENING FOR DIABETES MELLITUS: ICD-10-CM

## 2022-01-15 DIAGNOSIS — Z01.84 IMMUNITY STATUS TESTING: ICD-10-CM

## 2022-01-15 LAB
ALBUMIN SERPL BCP-MCNC: 3.8 G/DL (ref 3.5–5)
ALP SERPL-CCNC: 46 U/L (ref 46–116)
ALT SERPL W P-5'-P-CCNC: 23 U/L (ref 12–78)
ANION GAP SERPL CALCULATED.3IONS-SCNC: 4 MMOL/L (ref 4–13)
AST SERPL W P-5'-P-CCNC: 10 U/L (ref 5–45)
BILIRUB SERPL-MCNC: 0.55 MG/DL (ref 0.2–1)
BUN SERPL-MCNC: 9 MG/DL (ref 5–25)
CALCIUM SERPL-MCNC: 8.8 MG/DL (ref 8.3–10.1)
CHLORIDE SERPL-SCNC: 108 MMOL/L (ref 100–108)
CO2 SERPL-SCNC: 25 MMOL/L (ref 21–32)
CREAT SERPL-MCNC: 0.7 MG/DL (ref 0.6–1.3)
FERRITIN SERPL-MCNC: 47 NG/ML (ref 8–388)
GFR SERPL CREATININE-BSD FRML MDRD: 115 ML/MIN/1.73SQ M
GLUCOSE P FAST SERPL-MCNC: 81 MG/DL (ref 65–99)
HBV SURFACE AB SER-ACNC: 22.37 MIU/ML
HBV SURFACE AG SER QL: NORMAL
IRON SATN MFR SERPL: 22 % (ref 15–50)
IRON SERPL-MCNC: 69 UG/DL (ref 50–170)
POTASSIUM SERPL-SCNC: 3.7 MMOL/L (ref 3.5–5.3)
PROT SERPL-MCNC: 7.9 G/DL (ref 6.4–8.2)
RUBV IGG SERPL IA-ACNC: 166.7 IU/ML
SODIUM SERPL-SCNC: 137 MMOL/L (ref 136–145)
TIBC SERPL-MCNC: 310 UG/DL (ref 250–450)
TSH SERPL DL<=0.05 MIU/L-ACNC: 1.87 UIU/ML (ref 0.36–3.74)

## 2022-01-15 PROCEDURE — 83540 ASSAY OF IRON: CPT

## 2022-01-15 PROCEDURE — 86787 VARICELLA-ZOSTER ANTIBODY: CPT

## 2022-01-15 PROCEDURE — 86765 RUBEOLA ANTIBODY: CPT

## 2022-01-15 PROCEDURE — 87340 HEPATITIS B SURFACE AG IA: CPT

## 2022-01-15 PROCEDURE — 82728 ASSAY OF FERRITIN: CPT

## 2022-01-15 PROCEDURE — 36415 COLL VENOUS BLD VENIPUNCTURE: CPT

## 2022-01-15 PROCEDURE — 86706 HEP B SURFACE ANTIBODY: CPT

## 2022-01-15 PROCEDURE — 86735 MUMPS ANTIBODY: CPT

## 2022-01-15 PROCEDURE — 86480 TB TEST CELL IMMUN MEASURE: CPT

## 2022-01-15 PROCEDURE — 84443 ASSAY THYROID STIM HORMONE: CPT

## 2022-01-15 PROCEDURE — 80053 COMPREHEN METABOLIC PANEL: CPT

## 2022-01-15 PROCEDURE — 83550 IRON BINDING TEST: CPT

## 2022-01-15 PROCEDURE — 86762 RUBELLA ANTIBODY: CPT

## 2022-01-17 LAB — VZV IGG SER IA-ACNC: NORMAL

## 2022-01-18 LAB
GAMMA INTERFERON BACKGROUND BLD IA-ACNC: 0.04 IU/ML
M TB IFN-G BLD-IMP: NEGATIVE
M TB IFN-G CD4+ BCKGRND COR BLD-ACNC: 0 IU/ML
M TB IFN-G CD4+ BCKGRND COR BLD-ACNC: 0 IU/ML
MEV IGG SER QL: NORMAL
MITOGEN IGNF BCKGRD COR BLD-ACNC: >10 IU/ML
MUV IGG SER QL: NORMAL

## 2022-03-01 ENCOUNTER — ANNUAL EXAM (OUTPATIENT)
Dept: OBGYN CLINIC | Facility: CLINIC | Age: 32
End: 2022-03-01
Payer: COMMERCIAL

## 2022-03-01 VITALS
DIASTOLIC BLOOD PRESSURE: 60 MMHG | HEIGHT: 66 IN | SYSTOLIC BLOOD PRESSURE: 102 MMHG | WEIGHT: 190 LBS | BODY MASS INDEX: 30.53 KG/M2

## 2022-03-01 DIAGNOSIS — N92.0 MENORRHAGIA WITH REGULAR CYCLE: ICD-10-CM

## 2022-03-01 DIAGNOSIS — Z01.419 ENCOUNTER FOR ANNUAL ROUTINE GYNECOLOGICAL EXAMINATION: Primary | ICD-10-CM

## 2022-03-01 PROCEDURE — S0612 ANNUAL GYNECOLOGICAL EXAMINA: HCPCS | Performed by: OBSTETRICS & GYNECOLOGY

## 2022-03-01 RX ORDER — TRANEXAMIC ACID 650 1/1
650 TABLET ORAL 3 TIMES DAILY
Qty: 30 TABLET | Refills: 11 | Status: SHIPPED | OUTPATIENT
Start: 2022-03-01

## 2022-03-01 NOTE — PROGRESS NOTES
Assessment/Plan:  Pap smear deferred due to low risk status  Encouraged self-breast examination as well as calcium supplementation  Continue lysteda as needed for menorrhagia  Reviewed menstrual diary  She will continue to follow-up with her primary care physician, history of migraine headaches  Return to office in 1 year or p r n  No problem-specific Assessment & Plan notes found for this encounter  Diagnoses and all orders for this visit:    Encounter for annual routine gynecological examination    Menorrhagia with regular cycle  -     Tranexamic Acid 650 MG TABS; Take 1 tablet (650 mg total) by mouth 3 (three) times a day          Subjective:      Patient ID: Miguel Bautista is a 34 y o  female  HPI     This is a very pleasant 27-year-old female G0 presents for gyn exam   She states her menstrual cycles are regular approximately every 4 weeks lasting 6-7 days described as heavy the first 2 days  She denies any breakthrough bleeding  She does use lysteda on a monthly basis with much success  There has been no changes in bowel or bladder function  She does use tampons with her menstrual cycle  Patient has never been sexually active  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[employer], labor and delivery x7 years  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, 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    The following portions of the patient's history were reviewed and updated as appropriate: allergies, current medications, past family history, past medical history, past social history, past surgical history and problem list     Review of Systems   Constitutional: Negative for fatigue, fever and unexpected weight change  Respiratory: Negative for cough, chest tightness, shortness of breath and wheezing  Cardiovascular: Negative  Negative for chest pain and palpitations  Gastrointestinal: Negative  Negative for abdominal distention, abdominal pain, blood in stool, constipation, diarrhea, nausea and vomiting  Genitourinary: Negative  Negative for difficulty urinating, dyspareunia, dysuria, flank pain, frequency, genital sores, hematuria, pelvic pain, urgency, vaginal bleeding, vaginal discharge and vaginal pain  Skin: Negative for rash  Objective:      /60   Ht 5' 6" (1 676 m)   Wt 86 2 kg (190 lb)   LMP 02/14/2022   BMI 30 67 kg/m²          Physical Exam  Constitutional:       Appearance: Normal appearance  She is well-developed  Cardiovascular:      Rate and Rhythm: Normal rate and regular rhythm  Pulmonary:      Effort: Pulmonary effort is normal       Breath sounds: Normal breath sounds  Chest:   Breasts:      Right: No inverted nipple, mass, nipple discharge, skin change or tenderness  Left: No inverted nipple, mass, nipple discharge, skin change or tenderness  Abdominal:      General: Bowel sounds are normal  There is no distension  Palpations: Abdomen is soft  Tenderness: There is no abdominal tenderness  There is no guarding or rebound  Genitourinary:     Labia:         Right: No rash, tenderness or lesion  Left: No rash, tenderness or lesion  Vagina: Normal  No signs of injury  No vaginal discharge or tenderness  Cervix: No cervical motion tenderness, discharge, friability, lesion, erythema or cervical bleeding  Uterus: Not enlarged and not tender  Adnexa:         Right: No mass, tenderness or fullness  Left: No mass, tenderness or fullness  Neurological:      Mental Status: She is alert and oriented to person, place, and time     Psychiatric:         Behavior: Behavior normal

## 2022-03-09 ENCOUNTER — PATIENT MESSAGE (OUTPATIENT)
Dept: FAMILY MEDICINE CLINIC | Facility: CLINIC | Age: 32
End: 2022-03-09

## 2022-03-09 DIAGNOSIS — G43.809 OTHER MIGRAINE WITHOUT STATUS MIGRAINOSUS, NOT INTRACTABLE: ICD-10-CM

## 2022-03-10 RX ORDER — IBUPROFEN 800 MG/1
800 TABLET ORAL EVERY 8 HOURS PRN
Qty: 30 TABLET | Refills: 1 | Status: SHIPPED | OUTPATIENT
Start: 2022-03-10

## 2022-08-05 ENCOUNTER — APPOINTMENT (OUTPATIENT)
Dept: LAB | Facility: AMBULARY SURGERY CENTER | Age: 32
End: 2022-08-05

## 2022-08-05 DIAGNOSIS — Z00.8 HEALTH EXAMINATION IN POPULATION SURVEY: ICD-10-CM

## 2022-08-05 LAB
CHOLEST SERPL-MCNC: 215 MG/DL
EST. AVERAGE GLUCOSE BLD GHB EST-MCNC: 100 MG/DL
HBA1C MFR BLD: 5.1 %
HDLC SERPL-MCNC: 66 MG/DL
LDLC SERPL CALC-MCNC: 136 MG/DL (ref 0–100)
NONHDLC SERPL-MCNC: 149 MG/DL
TRIGL SERPL-MCNC: 64 MG/DL

## 2022-08-05 PROCEDURE — 36415 COLL VENOUS BLD VENIPUNCTURE: CPT

## 2022-08-05 PROCEDURE — 80061 LIPID PANEL: CPT

## 2022-08-05 PROCEDURE — 83036 HEMOGLOBIN GLYCOSYLATED A1C: CPT

## 2022-10-26 DIAGNOSIS — N92.0 MENORRHAGIA WITH REGULAR CYCLE: ICD-10-CM

## 2022-10-26 DIAGNOSIS — G43.809 OTHER MIGRAINE WITHOUT STATUS MIGRAINOSUS, NOT INTRACTABLE: ICD-10-CM

## 2022-10-26 RX ORDER — TRANEXAMIC ACID 650 MG/1
650 TABLET ORAL 3 TIMES DAILY
Qty: 30 TABLET | Refills: 0 | Status: SHIPPED | OUTPATIENT
Start: 2022-10-26

## 2022-10-26 RX ORDER — IBUPROFEN 800 MG/1
800 TABLET ORAL EVERY 8 HOURS PRN
Qty: 30 TABLET | Refills: 1 | Status: SHIPPED | OUTPATIENT
Start: 2022-10-26

## 2023-01-03 DIAGNOSIS — Z11.1 SCREENING FOR TUBERCULOSIS: Primary | ICD-10-CM

## 2023-01-10 ENCOUNTER — APPOINTMENT (OUTPATIENT)
Dept: LAB | Facility: CLINIC | Age: 33
End: 2023-01-10

## 2023-01-10 DIAGNOSIS — Z11.1 SCREENING FOR TUBERCULOSIS: ICD-10-CM

## 2023-01-12 LAB
GAMMA INTERFERON BACKGROUND BLD IA-ACNC: 0.05 IU/ML
M TB IFN-G BLD-IMP: NEGATIVE
M TB IFN-G CD4+ BCKGRND COR BLD-ACNC: 0.01 IU/ML
M TB IFN-G CD4+ BCKGRND COR BLD-ACNC: 0.01 IU/ML
MITOGEN IGNF BCKGRD COR BLD-ACNC: >10 IU/ML

## 2023-03-30 ENCOUNTER — ANNUAL EXAM (OUTPATIENT)
Dept: OBGYN CLINIC | Facility: CLINIC | Age: 33
End: 2023-03-30

## 2023-03-30 VITALS
DIASTOLIC BLOOD PRESSURE: 68 MMHG | BODY MASS INDEX: 31.34 KG/M2 | HEIGHT: 66 IN | SYSTOLIC BLOOD PRESSURE: 114 MMHG | WEIGHT: 195 LBS

## 2023-03-30 DIAGNOSIS — N92.0 MENORRHAGIA WITH REGULAR CYCLE: ICD-10-CM

## 2023-03-30 DIAGNOSIS — Z01.419 ENCOUNTER FOR ANNUAL ROUTINE GYNECOLOGICAL EXAMINATION: Primary | ICD-10-CM

## 2023-03-30 RX ORDER — TRANEXAMIC ACID 650 MG/1
650 TABLET ORAL 3 TIMES DAILY
Qty: 30 TABLET | Refills: 7 | Status: SHIPPED | OUTPATIENT
Start: 2023-03-30

## 2023-03-30 NOTE — PROGRESS NOTES
Assessment/Plan:  Pap smear done as well as annual   Encourage self breast examination as well as calcium supplementation  Continue lysteda x1 year  She will continue to follow-up with primary care as scheduled  Return to office in 1 year or as needed  No problem-specific Assessment & Plan notes found for this encounter  Diagnoses and all orders for this visit:    Encounter for annual routine gynecological examination    Menorrhagia with regular cycle  -     Tranexamic Acid 650 MG TABS; Take 1 tablet (650 mg total) by mouth 3 (three) times a day          Subjective:      Patient ID: Samuel Kapoor is a 28 y o  female  HPI     This is a very pleasant 69-year-old female G0 who presents for GYN exam   She states her menstrual cycles are regular approximately every 28 days lasting 6 to 7 days described as heavy day 2/3  She has been using lysteda for a couple of days on a monthly basis with much improvement  She denies any changes in bowel or bladder function  She is using tampons during her menstrual cycle  Patient has never been sexually active  She did receive the Gardasil vaccine  Pap 2020 within normal limits  Employed full-time [employer], 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  The following portions of the patient's history were reviewed and updated as appropriate: allergies, current medications, past family history, past medical history, past social history, past surgical history and problem list     Review of Systems   Constitutional: Negative for fatigue, fever and unexpected weight change  Respiratory: Negative for cough, chest tightness, shortness of breath and wheezing  Cardiovascular: Negative  Negative for chest pain and palpitations  Gastrointestinal: Negative  Negative for abdominal distention, abdominal pain, blood in stool, constipation, diarrhea, nausea and vomiting  Genitourinary: Negative    Negative for difficulty urinating, dyspareunia, "dysuria, flank pain, frequency, genital sores, hematuria, pelvic pain, urgency, vaginal bleeding, vaginal discharge and vaginal pain  Skin: Negative for rash  Objective:      /68 (BP Location: Left arm, Patient Position: Sitting, Cuff Size: Standard)   Ht 5' 6\" (1 676 m)   Wt 88 5 kg (195 lb)   LMP 03/11/2023 (Exact Date)   BMI 31 47 kg/m²          Physical Exam  Constitutional:       Appearance: Normal appearance  She is well-developed  Cardiovascular:      Rate and Rhythm: Normal rate and regular rhythm  Pulmonary:      Effort: Pulmonary effort is normal       Breath sounds: Normal breath sounds  Chest:   Breasts:     Right: No inverted nipple, mass, nipple discharge, skin change or tenderness  Left: No inverted nipple, mass, nipple discharge, skin change or tenderness  Abdominal:      General: Bowel sounds are normal  There is no distension  Palpations: Abdomen is soft  Tenderness: There is no abdominal tenderness  There is no guarding or rebound  Genitourinary:     Labia:         Right: No rash, tenderness or lesion  Left: No rash, tenderness or lesion  Vagina: Normal  No signs of injury  No vaginal discharge or tenderness  Cervix: No cervical motion tenderness, discharge, friability, lesion, erythema or cervical bleeding  Uterus: Not enlarged and not tender  Adnexa:         Right: No mass, tenderness or fullness  Left: No mass, tenderness or fullness  Neurological:      Mental Status: She is alert and oriented to person, place, and time           "

## 2023-04-03 LAB
HPV HR 12 DNA CVX QL NAA+PROBE: NEGATIVE
HPV16 DNA CVX QL NAA+PROBE: NEGATIVE
HPV18 DNA CVX QL NAA+PROBE: NEGATIVE

## 2023-06-26 ENCOUNTER — OFFICE VISIT (OUTPATIENT)
Dept: URGENT CARE | Facility: MEDICAL CENTER | Age: 33
End: 2023-06-26
Payer: COMMERCIAL

## 2023-06-26 ENCOUNTER — HOSPITAL ENCOUNTER (EMERGENCY)
Facility: HOSPITAL | Age: 33
Discharge: HOME | End: 2023-06-26
Attending: EMERGENCY MEDICINE
Payer: COMMERCIAL

## 2023-06-26 ENCOUNTER — APPOINTMENT (EMERGENCY)
Dept: RADIOLOGY | Facility: HOSPITAL | Age: 33
End: 2023-06-26
Payer: COMMERCIAL

## 2023-06-26 VITALS
HEART RATE: 93 BPM | BODY MASS INDEX: 31.47 KG/M2 | SYSTOLIC BLOOD PRESSURE: 128 MMHG | TEMPERATURE: 98.8 F | RESPIRATION RATE: 18 BRPM | DIASTOLIC BLOOD PRESSURE: 66 MMHG | OXYGEN SATURATION: 97 % | WEIGHT: 195 LBS

## 2023-06-26 VITALS
HEART RATE: 104 BPM | SYSTOLIC BLOOD PRESSURE: 131 MMHG | RESPIRATION RATE: 18 BRPM | OXYGEN SATURATION: 98 % | DIASTOLIC BLOOD PRESSURE: 68 MMHG | TEMPERATURE: 98.7 F

## 2023-06-26 DIAGNOSIS — G51.0 BELL'S PALSY: Primary | ICD-10-CM

## 2023-06-26 DIAGNOSIS — Q67.0 FACIAL ASYMMETRY: ICD-10-CM

## 2023-06-26 DIAGNOSIS — R29.810 FACIAL WEAKNESS: Primary | ICD-10-CM

## 2023-06-26 LAB
ANION GAP SERPL CALCULATED.3IONS-SCNC: 3 MMOL/L
APTT PPP: 25 SECONDS (ref 23–37)
B BURGDOR IGG+IGM SER-ACNC: <0.2 AI
BUN SERPL-MCNC: 8 MG/DL (ref 5–25)
CALCIUM SERPL-MCNC: 9.2 MG/DL (ref 8.3–10.1)
CARDIAC TROPONIN I PNL SERPL HS: <2 NG/L
CHLORIDE SERPL-SCNC: 112 MMOL/L (ref 96–108)
CO2 SERPL-SCNC: 23 MMOL/L (ref 21–32)
CREAT SERPL-MCNC: 0.77 MG/DL (ref 0.6–1.3)
ERYTHROCYTE [DISTWIDTH] IN BLOOD BY AUTOMATED COUNT: 12.3 % (ref 11.6–15.1)
EXT PREGNANCY TEST URINE: NEGATIVE
EXT. CONTROL: NORMAL
FLUAV RNA RESP QL NAA+PROBE: NEGATIVE
FLUBV RNA RESP QL NAA+PROBE: NEGATIVE
GFR SERPL CREATININE-BSD FRML MDRD: 101 ML/MIN/1.73SQ M
GLUCOSE SERPL-MCNC: 103 MG/DL (ref 65–140)
GLUCOSE SERPL-MCNC: 86 MG/DL (ref 65–140)
HCT VFR BLD AUTO: 41.3 % (ref 34.8–46.1)
HGB BLD-MCNC: 14.1 G/DL (ref 11.5–15.4)
INR PPP: 0.92 (ref 0.84–1.19)
MCH RBC QN AUTO: 29 PG (ref 26.8–34.3)
MCHC RBC AUTO-ENTMCNC: 34.1 G/DL (ref 31.4–37.4)
MCV RBC AUTO: 85 FL (ref 82–98)
PLATELET # BLD AUTO: 269 THOUSANDS/UL (ref 149–390)
PMV BLD AUTO: 10 FL (ref 8.9–12.7)
POTASSIUM SERPL-SCNC: 4 MMOL/L (ref 3.5–5.3)
PROTHROMBIN TIME: 12.5 SECONDS (ref 11.6–14.5)
RBC # BLD AUTO: 4.86 MILLION/UL (ref 3.81–5.12)
RSV RNA RESP QL NAA+PROBE: NEGATIVE
SARS-COV-2 RNA RESP QL NAA+PROBE: NEGATIVE
SODIUM SERPL-SCNC: 138 MMOL/L (ref 135–147)
WBC # BLD AUTO: 6.54 THOUSAND/UL (ref 4.31–10.16)

## 2023-06-26 PROCEDURE — 85027 COMPLETE CBC AUTOMATED: CPT | Performed by: EMERGENCY MEDICINE

## 2023-06-26 PROCEDURE — 84484 ASSAY OF TROPONIN QUANT: CPT | Performed by: EMERGENCY MEDICINE

## 2023-06-26 PROCEDURE — 36415 COLL VENOUS BLD VENIPUNCTURE: CPT

## 2023-06-26 PROCEDURE — 99244 OFF/OP CNSLTJ NEW/EST MOD 40: CPT | Performed by: PHYSICIAN ASSISTANT

## 2023-06-26 PROCEDURE — 99213 OFFICE O/P EST LOW 20 MIN: CPT | Performed by: FAMILY MEDICINE

## 2023-06-26 PROCEDURE — 81025 URINE PREGNANCY TEST: CPT

## 2023-06-26 PROCEDURE — 86618 LYME DISEASE ANTIBODY: CPT

## 2023-06-26 PROCEDURE — 0241U HB NFCT DS VIR RESP RNA 4 TRGT: CPT | Performed by: EMERGENCY MEDICINE

## 2023-06-26 PROCEDURE — 70496 CT ANGIOGRAPHY HEAD: CPT

## 2023-06-26 PROCEDURE — 85610 PROTHROMBIN TIME: CPT | Performed by: EMERGENCY MEDICINE

## 2023-06-26 PROCEDURE — 85730 THROMBOPLASTIN TIME PARTIAL: CPT | Performed by: EMERGENCY MEDICINE

## 2023-06-26 PROCEDURE — 80048 BASIC METABOLIC PNL TOTAL CA: CPT | Performed by: EMERGENCY MEDICINE

## 2023-06-26 PROCEDURE — 93005 ELECTROCARDIOGRAM TRACING: CPT

## 2023-06-26 PROCEDURE — 70498 CT ANGIOGRAPHY NECK: CPT

## 2023-06-26 PROCEDURE — 82948 REAGENT STRIP/BLOOD GLUCOSE: CPT

## 2023-06-26 RX ORDER — VALACYCLOVIR HYDROCHLORIDE 1 G/1
1000 TABLET, FILM COATED ORAL 3 TIMES DAILY
Qty: 20 TABLET | Refills: 0 | Status: SHIPPED | OUTPATIENT
Start: 2023-06-26 | End: 2023-07-03

## 2023-06-26 RX ORDER — MINERAL OIL AND PETROLATUM 150; 830 MG/G; MG/G
OINTMENT OPHTHALMIC ONCE
Status: COMPLETED | OUTPATIENT
Start: 2023-06-26 | End: 2023-06-26

## 2023-06-26 RX ORDER — VALACYCLOVIR HYDROCHLORIDE 1 G/1
1000 TABLET, FILM COATED ORAL ONCE
Status: COMPLETED | OUTPATIENT
Start: 2023-06-26 | End: 2023-06-26

## 2023-06-26 RX ORDER — PREDNISONE 20 MG/1
80 TABLET ORAL ONCE
Status: COMPLETED | OUTPATIENT
Start: 2023-06-26 | End: 2023-06-26

## 2023-06-26 RX ORDER — PREDNISONE 20 MG/1
TABLET ORAL
Qty: 30 TABLET | Refills: 0 | Status: SHIPPED | OUTPATIENT
Start: 2023-06-27 | End: 2023-07-08

## 2023-06-26 RX ORDER — MINERAL OIL AND PETROLATUM 150; 830 MG/G; MG/G
OINTMENT OPHTHALMIC
Status: DISCONTINUED | OUTPATIENT
Start: 2023-06-26 | End: 2023-06-26

## 2023-06-26 RX ORDER — VALACYCLOVIR HYDROCHLORIDE 1 G/1
1000 TABLET, FILM COATED ORAL EVERY 8 HOURS SCHEDULED
Status: DISCONTINUED | OUTPATIENT
Start: 2023-06-26 | End: 2023-06-26

## 2023-06-26 RX ORDER — PREDNISONE 20 MG/1
TABLET ORAL
Qty: 4 TABLET | Refills: 0 | Status: SHIPPED | OUTPATIENT
Start: 2023-06-27 | End: 2023-06-26 | Stop reason: SDUPTHER

## 2023-06-26 RX ADMIN — VALACYCLOVIR 1000 MG: 1 TABLET, FILM COATED ORAL at 11:29

## 2023-06-26 RX ADMIN — MINERAL OIL AND WHITE PETROLATUM: 150; 830 OINTMENT OPHTHALMIC at 11:28

## 2023-06-26 RX ADMIN — PREDNISONE 80 MG: 20 TABLET ORAL at 11:29

## 2023-06-26 NOTE — ED PROVIDER NOTES
History  Chief Complaint   Patient presents with   • CVA/TIA-like Symptoms     Pt reports tongue weakness and L sided facial droop     HPI  Patient is a 79-year-old female with history of migraines presenting to the emergency department for left-sided facial droop  Patient states that on Thursday she started noticing some changes in sensation on the lateral borders of her tongue  Last night she then was brushing her teeth and noticed that she couldn't spit water out properly  This morning she noticed more drooping on the left side of her face  Denies having any headache, vision changes, changes in taste, confusion, speech changes, or weakness anywhere else in her body  Denies any recent fevers, tick bites, or rashes  Prior to Admission Medications   Prescriptions Last Dose Informant Patient Reported? Taking? SUMAtriptan (IMITREX) 50 mg tablet  Self No No   Sig: Take 1 tablet (50 mg total) by mouth once as needed for migraine for up to 1 dose   Tranexamic Acid 650 MG TABS   No No   Sig: Take 1 tablet (650 mg total) by mouth 3 (three) times a day   ibuprofen (MOTRIN) 800 mg tablet  Self No No   Sig: Take 1 tablet (800 mg total) by mouth every 8 (eight) hours as needed for mild pain or moderate pain      Facility-Administered Medications: None       Past Medical History:   Diagnosis Date   • Headache(784 0)     Middle school   • Migraine    • Otitis media    • Varicella    • Visual impairment     Wears contacts       Past Surgical History:   Procedure Laterality Date   • WISDOM TOOTH EXTRACTION         Family History   Problem Relation Age of Onset   • Heart attack Maternal Grandmother    • Heart attack Maternal Grandfather    • Hyperlipidemia Mother    • Cancer Mother         Breast; leukemia     I have reviewed and agree with the history as documented      E-Cigarette/Vaping   • E-Cigarette Use Never User      E-Cigarette/Vaping Substances   • Nicotine No    • THC No    • CBD No    • Flavoring No    • Other No Social History     Tobacco Use   • Smoking status: Never   • Smokeless tobacco: Never   Vaping Use   • Vaping Use: Never used   Substance Use Topics   • Alcohol use: Yes     Comment: Once or twice a month   • Drug use: Never        Review of Systems   Constitutional: Negative for chills and fever  HENT: Negative for congestion  Eyes: Negative for redness  Respiratory: Negative for cough and shortness of breath  Cardiovascular: Negative for chest pain and palpitations  Gastrointestinal: Negative for abdominal pain, diarrhea and vomiting  Endocrine: Negative for polyuria  Genitourinary: Negative for dysuria and hematuria  Musculoskeletal: Negative for arthralgias and back pain  Skin: Negative for rash  Neurological: Positive for facial asymmetry and numbness  Negative for light-headedness and headaches  All other systems reviewed and are negative  Physical Exam  ED Triage Vitals   Temperature Pulse Respirations Blood Pressure SpO2   06/26/23 0915 06/26/23 0915 06/26/23 0915 06/26/23 0920 06/26/23 0915   98 8 °F (37 1 °C) 99 18 139/77 98 %      Temp Source Heart Rate Source Patient Position - Orthostatic VS BP Location FiO2 (%)   06/26/23 0915 06/26/23 0915 06/26/23 0915 06/26/23 0915 --   Oral Monitor Sitting Left arm       Pain Score       --                    Orthostatic Vital Signs  Vitals:    06/26/23 1000 06/26/23 1015 06/26/23 1031 06/26/23 1115   BP: 135/74 124/78 124/78 128/66   Pulse: 92 97 92 93   Patient Position - Orthostatic VS:           Physical Exam  Vitals and nursing note reviewed  Constitutional:       Appearance: Normal appearance  HENT:      Head: Normocephalic and atraumatic  Mouth/Throat:      Mouth: Mucous membranes are moist    Eyes:      Extraocular Movements: Extraocular movements intact  Conjunctiva/sclera: Conjunctivae normal       Pupils: Pupils are equal, round, and reactive to light     Cardiovascular:      Rate and Rhythm: Normal rate and regular rhythm  Pulses: Normal pulses  Heart sounds: Normal heart sounds  Pulmonary:      Effort: Pulmonary effort is normal       Breath sounds: Normal breath sounds  Abdominal:      Palpations: Abdomen is soft  Tenderness: There is no abdominal tenderness  Musculoskeletal:         General: No tenderness  Cervical back: Neck supple  Skin:     General: Skin is warm and dry  Capillary Refill: Capillary refill takes less than 2 seconds  Neurological:      General: No focal deficit present  Mental Status: She is alert and oriented to person, place, and time  Mental status is at baseline  Cranial Nerves: Cranial nerve deficit present  Sensory: No sensory deficit  Motor: No weakness  Coordination: Coordination normal       Gait: Gait normal       Comments: Left sided facial weakness, flattening left nasolabial fold  No involvement of forehead      Psychiatric:         Mood and Affect: Mood normal          ED Medications  Medications   predniSONE tablet 80 mg (80 mg Oral Given 6/26/23 1129)   artificial tear (LUBRIFRESH P M ) ophthalmic ointment ( Left Eye Given by Other 6/26/23 1128)   valACYclovir (VALTREX) tablet 1,000 mg (1,000 mg Oral Given 6/26/23 1129)       Diagnostic Studies  Results Reviewed     Procedure Component Value Units Date/Time    Lyme Antibody Profile with reflex to WB [[id number]]  (Normal) Collected: 06/26/23 0926    Lab Status: Final result Specimen: Blood from Arm, Right Updated: 06/26/23 1307     Lyme Total Antibodies <0 2 AI     POCT pregnancy, urine [[id number]]  (Normal) Resulted: 06/26/23 1020    Lab Status: Final result Updated: 06/26/23 1020     EXT Preg Test, Ur Negative     Control Valid    FLU/RSV/COVID - if FLU/RSV clinically relevant [[id number]]  (Normal) Collected: 06/26/23 0928    Lab Status: Final result Specimen: Nares from Nose Updated: 06/26/23 1017     SARS-CoV-2 Negative     INFLUENZA A PCR Negative     INFLUENZA B PCR Negative     RSV PCR Negative    Narrative:      FOR PEDIATRIC PATIENTS - copy/paste COVID Guidelines URL to browser: [email or web address]    SARS-CoV-2 assay is a Nucleic Acid Amplification assay intended for the  qualitative detection of nucleic acid from SARS-CoV-2 in nasopharyngeal  swabs  Results are for the presumptive identification of SARS-CoV-2 RNA  Positive results are indicative of infection with SARS-CoV-2, the virus  causing COVID-19, but do not rule out bacterial infection or co-infection  with other viruses  Laboratories within the [place] and its  territories are required to report all positive results to the appropriate  public health authorities  Negative results do not preclude SARS-CoV-2  infection and should not be used as the sole basis for treatment or other  patient management decisions  Negative results must be combined with  clinical observations, patient history, and epidemiological information  This test has not been FDA cleared or approved  This test has been authorized by FDA under an Emergency Use Authorization  (EUA)  This test is only authorized for the duration of time the  declaration that circumstances exist justifying the authorization of the  emergency use of an in vitro diagnostic tests for detection of SARS-CoV-2  virus and/or diagnosis of COVID-19 infection under section 564(b)(1) of  the Act, 21 U  S C  228KYM-8(W)(6), unless the authorization is terminated  or revoked sooner  The test has been validated but independent review by FDA  and CLIA is pending  Test performed using [employer] GeneXpert: This RT-PCR assay targets N2,  a region unique to SARS-CoV-2  A conserved region in the E-gene was chosen  for pan-Sarbecovirus detection which includes SARS-CoV-2  According to CMS-2020-01-R, this platform meets the definition of high-throughput technology      HS Troponin 0hr (reflex protocol) [[id number]] (Normal) Collected: 06/26/23 0926    Lab Status: Final result Specimen: Blood from Arm, Right Updated: 06/26/23 1002     hs TnI 0hr <2 ng/L     Basic metabolic panel [[id number]]  (Abnormal) Collected: 06/26/23 0926    Lab Status: Final result Specimen: Blood from Arm, Right Updated: 06/26/23 0953     Sodium 138 mmol/L      Potassium 4 0 mmol/L      Chloride 112 mmol/L      CO2 23 mmol/L      ANION GAP 3 mmol/L      BUN 8 mg/dL      Creatinine 0 77 mg/dL      Glucose 103 mg/dL      Calcium 9 2 mg/dL      eGFR 101 ml/min/1 73sq m     Narrative:      [place] guidelines for Chronic Kidney Disease (CKD):   •  Stage 1 with normal or high GFR (GFR > 90 mL/min/1 73 square meters)  •  Stage 2 Mild CKD (GFR = 60-89 mL/min/1 73 square meters)  •  Stage 3A Moderate CKD (GFR = 45-59 mL/min/1 73 square meters)  •  Stage 3B Moderate CKD (GFR = 30-44 mL/min/1 73 square meters)  •  Stage 4 Severe CKD (GFR = 15-29 mL/min/1 73 square meters)  •  Stage 5 End Stage CKD (GFR <15 mL/min/1 73 square meters)  Note: GFR calculation is accurate only with a steady state creatinine    Protime-INR [[id number]]  (Normal) Collected: 06/26/23 0926    Lab Status: Final result Specimen: Blood from Arm, Right Updated: 06/26/23 0951     Protime 12 5 seconds      INR 0 92    APTT [[id number]]  (Normal) Collected: 06/26/23 0926    Lab Status: Final result Specimen: Blood from Arm, Right Updated: 06/26/23 0951     PTT 25 seconds     CBC and Platelet [[id number]]  (Normal) Collected: 06/26/23 0926    Lab Status: Final result Specimen: Blood from Arm, Right Updated: 06/26/23 0936     WBC 6 54 Thousand/uL      RBC 4 86 Million/uL      Hemoglobin 14 1 g/dL      Hematocrit 41 3 %      MCV 85 fL      MCH 29 0 pg      MCHC 34 1 g/dL      RDW 12 3 %      Platelets 772 Thousands/uL      MPV 10 0 fL     Fingerstick Glucose (POCT) [[id number]]  (Normal) Collected: 06/26/23 0923    Lab Status: Final result Updated: 06/26/23 0925     POC Glucose 86 mg/dl                  CTA stroke alert (head/neck)   Final Result by ANTON Kovacs MD (06/26 6608)   No significant stenosis or dissection of the cervical carotid or vertebral arteries   No intracranial stenosis, large vessel occlusion or aneurysm  Findings were directly discussed with Shanice Arndt at 9:50 a m  Workstation performed: [id number]         CT stroke alert brain   Final Result by ANTON Kovacs MD (06/26 1814)      No acute intracranial abnormality  Findings were directly discussed with Shanice Arndt at 9:50 a m  Workstation performed: [id number]               Procedures  Procedures      ED Course  ED Course as of 06/27/23 1831 Mon Jun 26, 2023   1028 Neurology says symptoms likely [place] Palsy - recommends steroid taper, ophthalmic ointment at bedtime, and antivirals  1028 hs TnI 0hr: <2   1106 Procedure Note: EKG  Date/Time: 06/26/23 11:06 AM   Interpreted by: Charlene Hampton  Indications / Diagnosis: stroke alert  ECG reviewed by me, the ED Provider: yes   The EKG demonstrates:  Rate: 94  Rhythm: NSR  Intervals: regular  Axis: regular  QRS/Blocks: regular  ST Changes: No acute ST Changes, no STD/TOVA  No prior available for comparison      1107 Discussed results with patient  Comfortable with plan  Understands that lyme test is not back at this time, will follow up on [facility] for her results  Medical Decision Making  Amount and/or Complexity of Data Reviewed  Labs: ordered  Decision-making details documented in ED Course  Radiology: ordered  Risk  OTC drugs  Prescription drug management  Patient is a 28year old female with PMH of headaches who presents to the ED with left sided facial droop  Vital signs stable  On exam flattening left nasolabial fold, tongue deviation to the right, normal movement left forehead   Normal strength and sensation bilateral upper and lower extremities  History and physical exam most consistent with Thayer Palsy  However, differential diagnosis included but not limited to acute stroke  Plan stroke alert called, patients symptoms present for >24 hours however getting progressively worse  Will obtain stroke labs, ct head, as well as lyme panel and urine pregnancy test      View ED course above for further discussion on patient workup  All labs reviewed and utilized in the medical decision making process  All radiology studies independently viewed by me and interpreted by the radiologist   I reviewed all testing with the patient  Upon re-evaluation patients facial droop still present, now starting to involve the forehead  No additional symptoms present  Symptoms consistent with incomplete bells palsy  Will treat with prednisone and valcyclovir  Will hold off on treatment for lyme at this time, patient understands that her lyme tests are still pending and that she will require treatment if they are positive  I have reviewed the patient's vital signs, nursing notes, and other relevant tests/information  I had a detailed discussion with the patient regarding the history, exam findings, and any diagnostic results  Plan to discharge home in stable condition with valcyclovir, prednisone, and lubricating eye ointment, follow up with primary care provider  Discussed with patient who is agreeable to plan  I discussed discharge instructions, need for follow-up, and oral return precautions for what to return for in addition to the written return precautions and discharge instructions, specifically highlighting areas of special concern  The patient verbalized understanding of the discharge instructions and warnings that would necessitate return to the Emergency Department including worsening symptoms, headache, speech changes  All questions the patient had were answered prior to discharge to the best of my ability           Disposition  Final diagnoses:   Bell's palsy   Facial asymmetry     Time reflects when diagnosis was documented in both MDM as applicable and the Disposition within this note     Time User Action Codes Description Comment    6/26/2023  9:19 AM Raleigh Arts Add [I63 9] Stroke ([address] )     6/26/2023 10:34 AM Norris White L Add [G51 0] Jacinto's palsy     6/26/2023 10:34 AM Norris White L Add [Q67 0] Facial asymmetry     6/27/2023  5:28 PM Theodora Jo Modify [I63 9] [facility]) pt evaluated as a stroke alert, does not have the diagnosis of stroke    6/27/2023  5:28 PM Amelia Barnacle A Modify [I63 9] [facility]) pt evaluated as a stroke alert, does not have the diagnosis of stroke    6/27/2023  5:28 PM Amelia Barnacle A Modify [G51 0] Bell's palsy     6/27/2023  6:30 PM Maryl Nageotte Modify [G51 0] Bell's palsy     6/27/2023  6:30 PM Maru Hunter [I63 9] [facility]) pt evaluated as a stroke alert, does not have the diagnosis of stroke      ED Disposition     ED Disposition   Discharge    Condition   Stable    Date/Time   Mon Jun 26, 2023 11:36 AM    Lyric Dahl [facility] discharge to home/self care                 Follow-up Information     Follow up With Specialties Details Why Contact Info    Juan Hull [facility] Medicine Call in 1 day  [facility]  [phone]            Discharge Medication List as of 6/26/2023 11:37 AM      START taking these medications    Details   valACYclovir (VALTREX) 1,000 mg tablet Take 1 tablet (1,000 mg total) by mouth 3 (three) times a day for 7 days, Starting Mon 6/26/2023, Until Mon 7/3/2023, Normal      predniSONE 20 mg tablet Multiple Dosages:Starting Tue 6/27/2023, Until Fri 6/30/2023 at 2359, THEN Starting Sat 7/1/2023, Until Sat 7/1/2023 at 2359, THEN Starting Sun 7/2/2023, Until Sun 7/2/2023 at 2359, THEN Starting Mon 7/3/2023, Until Mon 7/3/2023 at 2359, THEN Starti ng Tue 7/4/2023, Until Tue 7/4/2023 at 2359, THEN Starting Wed 7/5/2023, Until Wed 7/5/2023 at 2359, THEN Starting Thu 7/6/2023, Until Thu 7/6/2023 at 2359, THEN Starting Fri 7/7/2023, Until Fri 7/7/2023 at 2359Take 4 tablets (80 mg total) by mouth d aily for 4 days, THEN 3 5 tablets (70 mg total) daily for 1 day, THEN 3 tablets (60 mg total) daily for 1 day, THEN 2 5 tablets (50 mg total) daily for 1 day, THEN 2 tablets (40 mg total) daily for 1 day, THEN 1 5 tablets (30 mg total) daily for 1 day, T HEN 1 tablet (20 mg total) daily for 1 day, THEN 0 5 tablets (10 mg total) daily for 1 day  Do not start before June 27, 2023 , Normal         CONTINUE these medications which have NOT CHANGED    Details   ibuprofen (MOTRIN) 800 mg tablet Take 1 tablet (800 mg total) by mouth every 8 (eight) hours as needed for mild pain or moderate pain, Starting Wed 10/26/2022, Normal      SUMAtriptan (IMITREX) 50 mg tablet Take 1 tablet (50 mg total) by mouth once as needed for migraine for up to 1 dose, Starting Thu 10/8/2020, Normal      Tranexamic Acid 650 MG TABS Take 1 tablet (650 mg total) by mouth 3 (three) times a day, Starting Thu 3/30/2023, Normal           No discharge procedures on file  PDMP Review     None           ED Provider  Attending physically available and evaluated Shruthi Alcantar I managed the patient along with the ED Attending      Electronically Signed by         Trsih William DO  06/27/23 330 Saurav ORTIZ DO  06/27/23 1087

## 2023-06-26 NOTE — ED ATTENDING ATTESTATION
6/26/2023  IJasmyne MD, saw and evaluated the patient  I have discussed the patient with the resident/non-physician practitioner and agree with the resident's/non-physician practitioner's findings, Plan of Care, and MDM as documented in the resident's/non-physician practitioner's note, except where noted  All available labs and Radiology studies were reviewed  I was present for key portions of any procedure(s) performed by the resident/non-physician practitioner and I was immediately available to provide assistance  At this point I agree with the current assessment done in the Emergency Department  I have conducted an independent evaluation of this patient a history and physical is as follows: This is an evaluation of a 76-year-old female with history of migraines but no other significant past medical history presents to the emergency department complaining of progressive symptoms of tongue numbness and facial numbness that have been ongoing since Thursday  Notes that her facial symptoms of numbness progressed throughout the weekend  Saturday and Sunday she noted an asymmetric smile  Otherwise denies any numbness weakness or tingling  No change in taste  Noted hearing changes  No dizziness or headaches  No lightheadedness  No chest pain or shortness of breath  No nausea no vomiting  Denies any preceding rashes or illnesses  On exam patient is nontoxic-appearing  HEENT is normocephalic and atraumatic  Moist mucous membranes and clear sclera and conjunctiva  Pupils equal round reactive to light  TMs clear  Patient has symmetric lifting of forehead however she does have asymmetric blinking  Asymmetric nasolabial fold with flattening on the left  No tongue deviation appreciated  Neck is supple full range of motion  Heart is regular rate no murmurs  Lungs are clear  Intact strength and sensation throughout bilateral extremities upper and lower  No drift    Intact "finger-to-nose  Awake alert oriented and appropriate  Assessment and plan 76-year-old female with concern for incomplete presentation of Bell's palsy given isolated facial findings versus less likely neuro insult such as TIA /CVA  Symptoms have been progressive and ongoing since Thursday  Given not complete presentation for Bell's we will do stroke alert and have neurology eval   Neurology to room  Labs EKG imaging  Reevaluate  Discussion with neurology for disposition  Portions of the record may have been created with voice recognition software  Occasional wrong word or “sound-a-like\" substitutions may have occurred due to the inherent limitations of voice recognition software  Review chart carefully and recognize, using context, where substitutions have occurred  ED Course   Neurology agrees, Bell's  Lyme titer pending  Discussed this with patient and mother at bedside  Discussed outpatient treatment for Bell's palsy given presentation and symptoms versus inpatient observation  Patient feels comfortable with discharge at this time  Not have any new symptoms develop  Will do steroids, antivirals and ophthalmic ointment  Interoffice message sent to the pt's PCP for f/u as well as neurology f/u  Noted the patient had stroke as a diagnosis on her chart  Pt was seen and evaluated by neurology, she was NOT diagnosed with a stroke, unable to remove diagnosis from her chart         Critical Care Time  Procedures      "

## 2023-06-26 NOTE — PATIENT INSTRUCTIONS
Vital signs are stable  Patient demonstrates 7th cranial nerve palsy but rest of neurologic exam unremarkable  Patient was referred to [facility] emergency room for further evaluation  She was transported by private vehicle in stable condition  Jacinto Palsy   WHAT YOU NEED TO KNOW:   Bell palsy is a sudden weakness or paralysis of one side of your face  It occurs when the nerve that controls the muscles in your face becomes swollen or irritated  Bell palsy usually lasts about 2 to 3 weeks, but it can last for up to 6 months  Bell palsy can be permanent for some people  DISCHARGE INSTRUCTIONS:   Return to the emergency department if:   You have vision changes or a loss of vision  Call your doctor if:   You have a fever  Your eye becomes red, irritated, or painful  Your symptoms have not gone away after 3 weeks  You have questions or concerns about your condition or care  Medicines: You may need any of the following:  Steroids  may be given to decrease swelling and irritation of the nerve in your face  Your symptoms can go away faster if you get steroids 72 hours from when your paralysis started  Antiviral  medicine may be given if your healthcare provider thinks a virus caused your Bell palsy  Acetaminophen  decreases pain and fever  It is available without a doctor's order  Ask how much to take and how often to take it  Follow directions  Read the labels of all other medicines you are using to see if they also contain acetaminophen, or ask your doctor or pharmacist  Acetaminophen can cause liver damage if not taken correctly  NSAIDs , such as ibuprofen, help decrease swelling, pain, and fever  This medicine is available with or without a doctor's order  NSAIDs can cause stomach bleeding or kidney problems in certain people  If you take blood thinner medicine, always ask your healthcare provider if NSAIDs are safe for you   Always read the medicine label and follow directions  Take your medicine as directed  Contact your healthcare provider if you think your medicine is not helping or if you have side effects  Tell your provider if you are allergic to any medicine  Keep a list of the medicines, vitamins, and herbs you take  Include the amounts, and when and why you take them  Bring the list or the pill bottles to follow-up visits  Carry your medicine list with you in case of an emergency  Manage Bell palsy:   Eye care  may be needed to prevent vision changes, eye damage, and infection  Use eye drops during the day and an ointment at night, as directed  You may need to wear an eye patch during the day  Wear sunglasses to protect your eye from direct sunlight  Stay away from places that have particles in the air that may harm your eye  You may also need to tape your eye shut while you sleep  Get your eye checked as directed if your symptoms last longer than 3 weeks  Eat soft foods  that are easy to chew and swallow  These foods may be chopped, ground, mashed, pureed, and moist  Do not  eat hard or chewy foods  These foods may fall out of your mouth where it droops  Ask your healthcare provider or dietitian about the foods you should eat  Go to speech therapy  if you have trouble eating or drinking that continues longer than 3 weeks  A speech therapist can teach you new ways to eat and drink  The therapist can show you ways to prevent or manage problems with drooling or swallowing  You may also learn to plan several small meals instead of a few large meals each day  Use ear plugs or ear protectors  around loud noises, such as a lawnmower or loud music  Foam earplugs that completely block your ear canal can help decrease your sensitive hearing  Do not listen to loud music through headphones or earphones  Go to physical therapy  as directed  A physical therapist can teach you how to massage and exercise the muscles in your face   These exercises may help prevent long-term problems such as muscle spasms and permanent paralysis in your face  Talk to a mental health therapist  if your symptoms are causing a low mood or anxiety  The therapist can help you cope while you recover  Follow up with your doctor as directed:  Write down your questions so you remember to ask them during your visits  © Copyright Sheri Keating 2022 Information is for End User's use only and may not be sold, redistributed or otherwise used for commercial purposes  The above information is an  only  It is not intended as medical advice for individual conditions or treatments  Talk to your doctor, nurse or pharmacist before following any medical regimen to see if it is safe and effective for you

## 2023-06-26 NOTE — DISCHARGE INSTRUCTIONS
You were seen in the emergency department today for left sided facial droop  Your testing showed you had no abnormalities on your CT to suggest acute stroke  Your Lyme test is pending  Follow up with your primary care provider  Take Valcyclovir as prescribed  Take Prednisone as prescribed  Return to the emergency department for any new or concerning symptoms including headache, speech changes, weakness on one side of your body, fever  Thank you for choosing [facility] for your care today

## 2023-06-26 NOTE — PROGRESS NOTES
[facility]        NAME: Praveena Carney is a 28 y o  female  : 1990    MRN: [id number]  DATE: 2023  TIME: 8:43 AM    Assessment and Plan   Facial weakness [R29 810]  1  Facial weakness  Transfer to other facility            Patient Instructions       Follow up with PCP in 3-5 days  Proceed to  ER if symptoms worsen  Chief Complaint     Chief Complaint   Patient presents with   • numbness of face     Patient notices facial numbness with facial drooping that started last night  History of Present Illness       69-year-old female here today with complaints of acute facial numbness and facial droop and left-sided since last night  She noticed weakness left side of her face and uneven smile  Prior to this approximately 4 days ago, she began feeling some numbness in the tip of her tongue which has dissipated but her left-sided tongue still remains numb  He denies any slurred speech, loss of consciousness weakness numbness of upper extremity or lower extremity  Denies any double vision or loss of vision  Denies any headache presently  Patient denies any recent infection  However, upon further questioning, patient does refer to recent cold sore lower lip approximately 1 to 1-1/2 weeks ago  Denies any recent outdoor activity or any unusual rash  No recent history of head trauma or history of seizures  Review of Systems   Review of Systems   HENT: Negative  Respiratory: Negative  Neurological: Positive for facial asymmetry and numbness  Negative for dizziness, speech difficulty, weakness and headaches  All other systems reviewed and are negative          Current Medications       Current Outpatient Medications:   •  ibuprofen (MOTRIN) 800 mg tablet, Take 1 tablet (800 mg total) by mouth every 8 (eight) hours as needed for mild pain or moderate pain, Disp: 30 tablet, Rfl: 1  •  SUMAtriptan (IMITREX) 50 mg tablet, Take 1 tablet (50 mg total) by mouth once as needed for migraine for up to 1 dose, Disp: 9 tablet, Rfl: 0  •  Tranexamic Acid 650 MG TABS, Take 1 tablet (650 mg total) by mouth 3 (three) times a day, Disp: 30 tablet, Rfl: 7    Current Allergies     Allergies as of 06/26/2023   • (No Known Allergies)            The following portions of the patient's history were reviewed and updated as appropriate: allergies, current medications, past family history, past medical history, past social history, past surgical history and problem list      Past Medical History:   Diagnosis Date   • Headache(784 0)     Middle school   • Migraine    • Otitis media    • Varicella    • Visual impairment     Wears contacts       Past Surgical History:   Procedure Laterality Date   • WISDOM TOOTH EXTRACTION         Family History   Problem Relation Age of Onset   • Heart attack Maternal Grandmother    • Heart attack Maternal Grandfather    • Hyperlipidemia Mother    • Cancer Mother         Breast; leukemia         Medications have been verified  Objective   /68   Pulse 104   Temp 98 7 °F (37 1 °C)   Resp 18   SpO2 98%   No LMP recorded  Physical Exam     Physical Exam  Vitals and nursing note reviewed  Constitutional:       Appearance: Normal appearance  HENT:      Right Ear: Tympanic membrane, ear canal and external ear normal       Left Ear: Tympanic membrane, ear canal and external ear normal       Mouth/Throat:      Mouth: Mucous membranes are moist    Eyes:      Extraocular Movements: Extraocular movements intact  Pupils: Pupils are equal, round, and reactive to light  Musculoskeletal:         General: Normal range of motion  Cervical back: Normal range of motion  Skin:     General: Skin is warm and dry  Neurological:      General: No focal deficit present  Mental Status: She is alert  Comments: Evidence of 7th cranial nerve weakness left frontal scalp and left facial muscle

## 2023-06-26 NOTE — PROGRESS NOTES
Pastoral Care Progress Note    2023  Patient: Leandro Perez : 1990  Admission Date & Time: 2023 0555  MRN: [id number] [facility]: [id number]           23 0900   Clinical Encounter Type   Visited With Patient not available   Crisis Visit ED; Code  (Stroke Alert)      responded to Code via TT  When I arrived pt was being assessed by Medical Staff  No needs were expressed at this time  Chaplains still remain available

## 2023-06-26 NOTE — Clinical Note
[facility]le [facility] accompanied Omayra [facility] to the emergency department on 6/26/2023  Return date if applicable: 83/20/1831    Was in the emergency department with her daughter on 6/26/23  If you have any questions or concerns, please don't hesitate to call        Shraddha Evans, DO

## 2023-06-26 NOTE — CONSULTS
Consultation - Stroke   Deretha Dandy [facility] 28 y o  female MRN: [id number]  Unit/Bed#[facility] Encounter: [id number]      Assessment/Plan     Bell's palsy  Assessment & Plan  42-year-old female with no significant past medical history presented for evaluation of left facial weakness, beginning yesterday evening  No other focal neurologic deficits noted  Stroke alert was activated in the ED   CT/CTA unremarkable  NIHSS 2 for L facial droop  Presentation is most consistent with Bell's palsy  Unclear etiology, however patient does report she had a cold sore 1 to 2 weeks ago, therefore may be underlying viral etiology  Plan:  - Can defer additional neuroimaging  - Recommend prednisone taper: 80 mg daily x 5 days, then decrease by 10 mg daily until complete  - Ophthalmic ointment applied to L eye QHS  - Artificial tears applied to L eye TID  - Consider addition of antiviral agent   - No additional inpatient neurologic recs      Thrombolytic Decision: Patient not a candidate  Outside appropriate time window, unlikely stroke  Dwight Harding will not need outpatient follow up with Neurology  She will not require outpatient neurological testing  She should follow up with her PCP in about 1 week  History of Present Illness     Reason for Consult / Principal Problem: Stroke alert  Patient last known well: Thursday AM  Stroke alert called: Lazaro  Neurology time of arrival: 0929    HPI: Dwight Harding is a 28 y o  female with no significant past medical history who presented to the ED for evaluation of left facial weakness  Patient reports that last Thursday, she noticed a weird sensation around the periphery of her tongue (bilaterally)  Yesterday evening, when she was brushing her teeth, she noticed that she had trouble spitting  She cannot move her face properly in order to spit  She also noticed that the left side of her face was drooping    It was slightly more evident this morning, therefore she came to the ED  She initially went to urgent care who referred her here  She reports that the left side of her face feels funny, but is not numb  No sensory deficits are noted when testing  She denies any other focal neurologic symptoms  She denies any headache  She reports that she had a cold sore on her tongue about a week ago  Inpatient consult to Neurology  Consult performed by: Riri Valentin PA-C  Consult ordered by: Shameka Tee MD          Review of Systems   Neurological: Positive for weakness  Historical Information   Past Medical History:   Diagnosis Date   • Headache(784 0)     Middle school   • Migraine    • Otitis media    • Varicella    • Visual impairment     Wears contacts     Past Surgical History:   Procedure Laterality Date   • WISDOM TOOTH EXTRACTION       Social History   Social History     Substance and Sexual Activity   Alcohol Use Yes    Comment: Once or twice a month     Social History     Substance and Sexual Activity   Drug Use Never     E-Cigarette/Vaping   • E-Cigarette Use Never User      E-Cigarette/Vaping Substances   • Nicotine No    • THC No    • CBD No    • Flavoring No    • Other No      Social History     Tobacco Use   Smoking Status Never   Smokeless Tobacco Never     Family History:   Family History   Problem Relation Age of Onset   • Heart attack Maternal Grandmother    • Heart attack Maternal Grandfather    • Hyperlipidemia Mother    • Cancer Mother         Breast; leukemia       Review of previous medical records was completed  Meds/Allergies   all current active meds have been reviewed, current meds:   Current Facility-Administered Medications   Medication Dose Route Frequency   • iohexol (OMNIPAQUE) 350 MG/ML injection (SINGLE-DOSE) 85 mL  85 mL Intravenous Once in imaging    and PTA meds:   Prior to Admission Medications   Prescriptions Last Dose Informant Patient Reported? Taking?    SUMAtriptan (IMITREX) 50 mg tablet  Self No No   Sig: Take 1 tablet (50 mg total) by mouth once as needed for migraine for up to 1 dose   Tranexamic Acid 650 MG TABS   No No   Sig: Take 1 tablet (650 mg total) by mouth 3 (three) times a day   ibuprofen (MOTRIN) 800 mg tablet  Self No No   Sig: Take 1 tablet (800 mg total) by mouth every 8 (eight) hours as needed for mild pain or moderate pain      Facility-Administered Medications: None       No Known Allergies    Objective   Vitals:Blood pressure 124/78, pulse 97, temperature 98 8 °F (37 1 °C), temperature source Oral, resp  rate 20, weight 88 5 kg (195 lb), SpO2 95 %, not currently breastfeeding  ,Body mass index is 31 47 kg/m²  No intake or output data in the 24 hours ending 06/26/23 1019    Invasive Devices: Invasive Devices     Peripheral Intravenous Line  Duration           Peripheral IV 06/26/23 Right Antecubital <1 day                  Physical Exam:   Vital signs and nursing notes reviewed  Constitutional: Appears comfortable  Well developed/well nourished  No diaphoresis  No acute distress  HENT: Normocephalic, atraumatic  B/L external ears and nose appears normal  Oropharynx clear and moist    Eyes: EOMs intact without nystagmus  No scleral icterus or injection  No discharge  Neck: Supple, normal ROM  No stridor noted  Cardiovascular: Regular rate  Pulmonary: No respiratory distress  Effort normal  No stridor or wheezing evident  Musculoskeletal: Normal ROM  No tenderness, edema, or deformities noted  Neurological: Detailed below  Skin: Warm and dry  No erythema or rashes  No jaundice  Psychiatric: Normal mood and affect, normal thought process/thought content  No hallucinations  Good insight  Neurologic Exam:  Mental Status: Patient is awake and alert  Oriented x 3  Follows all commands without difficulty  No dysarthria  No aphasia       Cranial Nerves: Cranial nerves 2-12 intact except as noted:   · Slightly weaker eyelid closure on the L   · L eyebrow does not raise as high as the R eyebrow  · L facial droop    Motor: 5/5 strength throughout bilateral upper and lower extremities  Sensation: Sensation to light touch intact throughout  Coordination: Finger to nose testing normal  Heel to shin normal      No tremors noted  Gait: Deferred         NIHSS:  1a Level of Consciousness: 0 = Alert   1b  LOC Questions: 0 = Answers both correctly   1c  LOC Commands: 0 = Obeys both correctly   2  Best Gaze: 0 = Normal   3  Visual: 0 = No visual field loss   4  Facial Palsy: 2=Partial paralysis (total or near total paralysis of the lower face)   5a  Motor Right Arm: 0=No drift, limb holds 90 (or 45) degrees for full 10 seconds   5b  Motor Left Arm: 0=No drift, limb holds 90 (or 45) degrees for full 10 seconds   6a  Motor Right Le=No drift, limb holds 90 (or 45) degrees for full 10 seconds   6b  Motor Left Le=No drift, limb holds 90 (or 45) degrees for full 10 seconds   7  Limb Ataxia:  0=Absent   8  Sensory: 0=Normal; no sensory loss   9  Best Language:  0=No aphasia, normal   10  Dysarthria: 0=Normal articulation   11  Extinction and Inattention (formerly Neglect): 0=No abnormality   Total Score: 2     Time NIHSS was completed: 1000 - following CT/CTA    Modified Sibley Score:  0 (No baseline symptoms/disability)    Lab Results: I have personally reviewed pertinent reports  Imaging Studies: I have personally reviewed pertinent reports  and I have personally reviewed pertinent films in PACS (CT/CTA)  EKG, Pathology, and Other Studies: I have personally reviewed pertinent reports  VTE Prophylaxis: In ED    Code Status: No Order  Counseling / Coordination of Care  Total Critical Care time spent 30 minutes excluding procedures, teaching and family updates

## 2023-06-26 NOTE — ASSESSMENT & PLAN NOTE
42-year-old female with no significant past medical history presented for evaluation of left facial weakness, beginning yesterday evening  No other focal neurologic deficits noted  Stroke alert was activated in the ED   CT/CTA unremarkable  NIHSS 2 for L facial droop  Presentation is most consistent with Bell's palsy  Unclear etiology, however patient does report she had a cold sore 1 to 2 weeks ago, therefore may be underlying viral etiology      Plan:  - Can defer additional neuroimaging  - Recommend prednisone taper: 80 mg daily x 5 days, then decrease by 10 mg daily until complete  - Ophthalmic ointment applied to L eye QHS  - Artificial tears applied to L eye TID  - Consider addition of antiviral agent   - No additional inpatient neurologic recs

## 2023-06-26 NOTE — Clinical Note
Lashay Plascencia was seen and treated in our emergency department on 6/26/2023  Diagnosis:     Randa Barron  may return to work on return date  She may return on this date: 07/03/2023    May return sooner than 7/3 if feeling better  If you have any questions or concerns, please don't hesitate to call        Phoebe Navas DO    ______________________________           _______________          _______________  Hospital Representative                              Date                                Time

## 2023-06-27 LAB
ATRIAL RATE: 94 BPM
P AXIS: 8 DEGREES
PR INTERVAL: 144 MS
QRS AXIS: 67 DEGREES
QRSD INTERVAL: 88 MS
QT INTERVAL: 334 MS
QTC INTERVAL: 417 MS
T WAVE AXIS: 29 DEGREES
VENTRICULAR RATE: 94 BPM

## 2023-06-27 PROCEDURE — 93010 ELECTROCARDIOGRAM REPORT: CPT | Performed by: INTERNAL MEDICINE

## 2023-07-17 ENCOUNTER — APPOINTMENT (OUTPATIENT)
Dept: LAB | Facility: CLINIC | Age: 33
End: 2023-07-17

## 2023-07-17 ENCOUNTER — TRANSCRIBE ORDERS (OUTPATIENT)
Dept: LAB | Facility: CLINIC | Age: 33
End: 2023-07-17

## 2023-07-17 DIAGNOSIS — Z00.8 ENCOUNTER FOR OTHER GENERAL EXAMINATION: ICD-10-CM

## 2023-07-17 DIAGNOSIS — Z00.8 ENCOUNTER FOR OTHER GENERAL EXAMINATION: Primary | ICD-10-CM

## 2023-07-17 LAB
CHOLEST SERPL-MCNC: 225 MG/DL
EST. AVERAGE GLUCOSE BLD GHB EST-MCNC: 82 MG/DL
HBA1C MFR BLD: 4.5 %
HDLC SERPL-MCNC: 73 MG/DL
LDLC SERPL CALC-MCNC: 137 MG/DL (ref 0–100)
NONHDLC SERPL-MCNC: 152 MG/DL
TRIGL SERPL-MCNC: 75 MG/DL

## 2023-07-17 PROCEDURE — 80061 LIPID PANEL: CPT

## 2023-07-17 PROCEDURE — 83036 HEMOGLOBIN GLYCOSYLATED A1C: CPT

## 2023-07-17 PROCEDURE — 36415 COLL VENOUS BLD VENIPUNCTURE: CPT

## 2023-07-26 ENCOUNTER — OFFICE VISIT (OUTPATIENT)
Dept: FAMILY MEDICINE CLINIC | Facility: CLINIC | Age: 33
End: 2023-07-26
Payer: COMMERCIAL

## 2023-07-26 VITALS
DIASTOLIC BLOOD PRESSURE: 70 MMHG | TEMPERATURE: 97.9 F | OXYGEN SATURATION: 98 % | HEART RATE: 84 BPM | BODY MASS INDEX: 30.31 KG/M2 | WEIGHT: 188.6 LBS | HEIGHT: 66 IN | SYSTOLIC BLOOD PRESSURE: 124 MMHG

## 2023-07-26 DIAGNOSIS — Z11.59 NEED FOR HEPATITIS C SCREENING TEST: ICD-10-CM

## 2023-07-26 DIAGNOSIS — Z13.1 SCREENING FOR DIABETES MELLITUS: ICD-10-CM

## 2023-07-26 DIAGNOSIS — Z00.00 ANNUAL PHYSICAL EXAM: Primary | ICD-10-CM

## 2023-07-26 DIAGNOSIS — G51.0 BELL'S PALSY: ICD-10-CM

## 2023-07-26 DIAGNOSIS — Z11.4 SCREENING FOR HIV (HUMAN IMMUNODEFICIENCY VIRUS): ICD-10-CM

## 2023-07-26 DIAGNOSIS — B00.1 HERPES LABIALIS: ICD-10-CM

## 2023-07-26 DIAGNOSIS — G43.809 OTHER MIGRAINE WITHOUT STATUS MIGRAINOSUS, NOT INTRACTABLE: ICD-10-CM

## 2023-07-26 DIAGNOSIS — Z13.29 SCREENING FOR THYROID DISORDER: ICD-10-CM

## 2023-07-26 PROCEDURE — 99395 PREV VISIT EST AGE 18-39: CPT | Performed by: NURSE PRACTITIONER

## 2023-07-26 RX ORDER — VALACYCLOVIR HYDROCHLORIDE 1 G/1
1000 TABLET, FILM COATED ORAL 2 TIMES DAILY
Qty: 30 TABLET | Refills: 0 | Status: SHIPPED | OUTPATIENT
Start: 2023-07-26 | End: 2023-07-27

## 2023-07-26 RX ORDER — SUMATRIPTAN 50 MG/1
50 TABLET, FILM COATED ORAL ONCE AS NEEDED
Qty: 9 TABLET | Refills: 0 | Status: SHIPPED | OUTPATIENT
Start: 2023-07-26

## 2023-07-26 NOTE — ASSESSMENT & PLAN NOTE
Recent episode. Symptoms resolved - exception; mild tingle x2 episodes. completed Prednisone, Valacyclovir.  ER precautions with further concerns

## 2023-07-26 NOTE — PROGRESS NOTES
ADULT ANNUAL [facility]    NAME: Eveline Tavarezsalbador  AGE: 28 y.o. 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 PMH and chronic conditions reviewed  Medications reconciled  Preventative care and recommended screenings as below  Continue annual physicals  Follow-up sooner as needed  Problem List Items Addressed This Visit        Nervous and Auditory    Bell's palsy     Recent episode. Symptoms resolved - exception; mild tingle x2 episodes. completed Prednisone, Valacyclovir. ER precautions with further concerns        Other Visit Diagnoses     Annual physical exam    -  Primary    Herpes labialis        Relevant Medications    valACYclovir (VALTREX) 1,000 mg tablet    Screening for thyroid disorder        Relevant Orders    TSH, 3rd generation with Free T4 reflex    Screening for diabetes mellitus        Relevant Orders    Comprehensive metabolic panel    Screening for HIV (human immunodeficiency virus)        Relevant Orders    : HIV 1/2 AB/AG w Reflex [facility] for 2 yr old and above    Need for hepatitis C screening test        Relevant Orders    Hepatitis C antibody    Other migraine without status migrainosus, not intractable        Relevant Medications    SUMAtriptan (IMITREX) 50 mg tablet          Immunizations and preventive care screenings were discussed with patient today. Appropriate education was printed on patient's after visit summary. Counseling:  Alcohol/drug use: discussed moderation in alcohol intake, the recommendations for healthy alcohol use, and avoidance of illicit drug use. Dental Health: discussed importance of regular tooth brushing, flossing, and dental visits.   Injury prevention: discussed safety/seat belts, safety helmets, smoke detectors, carbon dioxide detectors, and smoking near bedding or upholstery. Sexual health: discussed sexually transmitted diseases, partner selection, use of condoms, avoidance of unintended pregnancy, and contraceptive alternatives. · Exercise: the importance of regular exercise/physical activity was discussed. Recommend exercise 3-5 times per week for at least 30 minutes. BMI Counseling: Body mass index is 30.44 kg/m². The BMI is above normal. Nutrition recommendations include decreasing portion sizes, moderation in carbohydrate intake and reducing intake of saturated and trans fat. Exercise recommendations include moderate physical activity 150 minutes/week. Rationale for BMI follow-up plan is due to patient being overweight or obese. Healthy lifestyle counseling    Depression Screening and Follow-up Plan: Clincally patient does not have depression. No treatment is required. Return in about 1 year (around 7/26/2024) for Annual physical.     Chief Complaint:     No chief complaint on file. History of Present Illness:     Adult Annual Physical   Patient here for a comprehensive physical exam. The patient reports problems - as above. Diet and Physical Activity  · Diet/Nutrition: well balanced diet, limited junk food, low fat diet and consuming 3-5 servings of fruits/vegetables daily. · Exercise: walking and moderate cardiovascular exercise. Depression Screening  PHQ-2/9 Depression Screening         General Health  · Sleep: sleeps well. · Hearing: normal - bilateral.  · Vision: no vision problems, goes for regular eye exams and wears glasses and contacts. · Dental: regular dental visits. · Immunizations: Tdap 2020; Gardisil    /GYN Health  · Last menstrual period: 7/22  · Contraceptive method: no - not sexually active.   · History of STDs?: no.  · Women's health UTD  · Tranexamic Acid  · PAP (-) 2020  · Encourage monthly self breast exams  · Encourage daily vitamin supplements     Review of Systems:     Review of Systems Constitutional: Negative. HENT: Negative. Eyes: Negative. Respiratory: Negative. Cardiovascular: Negative. Gastrointestinal: Negative. Genitourinary: Negative. Musculoskeletal: Negative. Skin: Negative. Neurological: Negative. Psychiatric/Behavioral: Negative.        Past Medical History:     Past Medical History:   Diagnosis Date   • Headache(784.0)     Middle school   • Migraine    • Otitis media    • Varicella    • Visual impairment     Wears contacts      Past Surgical History:     Past Surgical History:   Procedure Laterality Date   • WISDOM TOOTH EXTRACTION        Social History:     Social History     Socioeconomic History   • Marital status: Single     Spouse name: None   • Number of children: None   • Years of education: None   • Highest education level: None   Occupational History   • Occupation: Registered nurse   Tobacco Use   • Smoking status: Never   • Smokeless tobacco: Never   Vaping Use   • Vaping Use: Never used   Substance and Sexual Activity   • Alcohol use: Yes     Comment: Once or twice a month   • Drug use: Never   • Sexual activity: Never   Other Topics Concern   • None   Social History Narrative   • None     Social Determinants of Health     Financial Resource Strain: Not on file   Food Insecurity: Not on file   Transportation Needs: Not on file   Physical Activity: Not on file   Stress: Not on file   Social Connections: Not on file   Intimate Partner Violence: Not on file   Housing Stability: Not on file      Family History:     Family History   Problem Relation Age of Onset   • Heart attack Maternal Grandmother    • Heart attack Maternal Grandfather    • Hyperlipidemia Mother    • Cancer Mother         Breast; leukemia      Current Medications:     Current Outpatient Medications   Medication Sig Dispense Refill   • ibuprofen (MOTRIN) 800 mg tablet Take 1 tablet (800 mg total) by mouth every 8 (eight) hours as needed for mild pain or moderate pain 30 tablet 1 • SUMAtriptan (IMITREX) 50 mg tablet Take 1 tablet (50 mg total) by mouth once as needed for migraine for up to 1 dose 9 tablet 0   • Tranexamic Acid 650 MG TABS Take 1 tablet (650 mg total) by mouth 3 (three) times a day 30 tablet 7   • valACYclovir (VALTREX) 1,000 mg tablet Take 1 tablet (1,000 mg total) by mouth 2 (two) times a day for 1 day 30 tablet 0     No current facility-administered medications for this visit. Allergies:     No Known Allergies   Physical Exam:     /70 (BP Location: Left arm, Patient Position: Sitting, Cuff Size: Adult)   Pulse 84   Temp 97.9 °F (36.6 °C)   Ht 5' 6" (1.676 m)   Wt 85.5 kg (188 lb 9.6 oz)   LMP 07/22/2023 (Exact Date)   SpO2 98%   BMI 30.44 kg/m²     Physical Exam  Vitals and nursing note reviewed. Constitutional:       General: She is not in acute distress. Appearance: Normal appearance. She is well-developed and well-groomed. She is not ill-appearing. HENT:      Head: Normocephalic. Right Ear: Tympanic membrane, ear canal and external ear normal.      Left Ear: Tympanic membrane, ear canal and external ear normal.      Nose: Nose normal.      Mouth/Throat:      Mouth: Mucous membranes are moist.      Pharynx: Oropharynx is clear. Eyes:      Conjunctiva/sclera: Conjunctivae normal.      Pupils: Pupils are equal, round, and reactive to light. Neck:      Thyroid: No thyromegaly. Vascular: No carotid bruit. Cardiovascular:      Rate and Rhythm: Normal rate and regular rhythm. Pulses:           Posterior tibial pulses are 2+ on the right side and 2+ on the left side. Heart sounds: Normal heart sounds. Pulmonary:      Effort: Pulmonary effort is normal. No respiratory distress. Breath sounds: Normal breath sounds and air entry. Abdominal:      General: Bowel sounds are normal.      Palpations: Abdomen is soft. Tenderness: There is no abdominal tenderness. Musculoskeletal:      Right lower leg: No edema. Left lower leg: No edema. Lymphadenopathy:      Cervical: No cervical adenopathy. Skin:     General: Skin is warm and dry. Neurological:      General: No focal deficit present. Mental Status: She is alert and oriented to person, place, and time. Psychiatric:         Attention and Perception: Attention normal.         Mood and Affect: Mood normal.         Behavior: Behavior normal.         Thought Content:  Thought content normal.         Judgment: Judgment normal.          CHRIS Rao   [address]

## 2023-09-01 ENCOUNTER — APPOINTMENT (OUTPATIENT)
Dept: LAB | Facility: CLINIC | Age: 33
End: 2023-09-01
Payer: COMMERCIAL

## 2023-09-01 DIAGNOSIS — Z13.1 SCREENING FOR DIABETES MELLITUS: ICD-10-CM

## 2023-09-01 DIAGNOSIS — Z11.59 NEED FOR HEPATITIS C SCREENING TEST: ICD-10-CM

## 2023-09-01 DIAGNOSIS — Z11.4 SCREENING FOR HIV (HUMAN IMMUNODEFICIENCY VIRUS): ICD-10-CM

## 2023-09-01 DIAGNOSIS — Z13.29 SCREENING FOR THYROID DISORDER: ICD-10-CM

## 2023-09-01 LAB
ALBUMIN SERPL BCP-MCNC: 4.2 G/DL (ref 3.5–5)
ALP SERPL-CCNC: 40 U/L (ref 34–104)
ALT SERPL W P-5'-P-CCNC: 12 U/L (ref 7–52)
ANION GAP SERPL CALCULATED.3IONS-SCNC: 7 MMOL/L
AST SERPL W P-5'-P-CCNC: 13 U/L (ref 13–39)
BILIRUB SERPL-MCNC: 0.58 MG/DL (ref 0.2–1)
BUN SERPL-MCNC: 10 MG/DL (ref 5–25)
CALCIUM SERPL-MCNC: 9.3 MG/DL (ref 8.4–10.2)
CHLORIDE SERPL-SCNC: 103 MMOL/L (ref 96–108)
CO2 SERPL-SCNC: 26 MMOL/L (ref 21–32)
CREAT SERPL-MCNC: 0.65 MG/DL (ref 0.6–1.3)
GFR SERPL CREATININE-BSD FRML MDRD: 116 ML/MIN/1.73SQ M
GLUCOSE P FAST SERPL-MCNC: 90 MG/DL (ref 65–99)
HCV AB SER QL: NORMAL
HIV 1+2 AB+HIV1 P24 AG SERPL QL IA: NORMAL
HIV 2 AB SERPL QL IA: NORMAL
HIV1 AB SERPL QL IA: NORMAL
HIV1 P24 AG SERPL QL IA: NORMAL
POTASSIUM SERPL-SCNC: 3.9 MMOL/L (ref 3.5–5.3)
PROT SERPL-MCNC: 7.3 G/DL (ref 6.4–8.4)
SODIUM SERPL-SCNC: 136 MMOL/L (ref 135–147)
TSH SERPL DL<=0.05 MIU/L-ACNC: 1.4 UIU/ML (ref 0.45–4.5)

## 2023-09-01 PROCEDURE — 87389 HIV-1 AG W/HIV-1&-2 AB AG IA: CPT

## 2023-09-01 PROCEDURE — 36415 COLL VENOUS BLD VENIPUNCTURE: CPT

## 2023-09-01 PROCEDURE — 86803 HEPATITIS C AB TEST: CPT

## 2023-09-01 PROCEDURE — 84443 ASSAY THYROID STIM HORMONE: CPT

## 2023-09-01 PROCEDURE — 80053 COMPREHEN METABOLIC PANEL: CPT
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
## 2023-09-11 RX ORDER — TRIAMCINOLONE ACETONIDE 1 MG/G
CREAM TOPICAL 2 TIMES DAILY
Qty: 15 G | Refills: 1 | Status: SHIPPED | OUTPATIENT
Start: 2023-09-11

## 2023-10-29 ENCOUNTER — APPOINTMENT (EMERGENCY)
Dept: RADIOLOGY | Facility: HOSPITAL | Age: 33
End: 2023-10-29
Payer: COMMERCIAL

## 2023-10-29 ENCOUNTER — HOSPITAL ENCOUNTER (EMERGENCY)
Facility: HOSPITAL | Age: 33
Discharge: HOME | End: 2023-10-29
Attending: EMERGENCY MEDICINE
Payer: COMMERCIAL

## 2023-10-29 VITALS
HEART RATE: 75 BPM | OXYGEN SATURATION: 97 % | TEMPERATURE: 98.5 F | SYSTOLIC BLOOD PRESSURE: 123 MMHG | RESPIRATION RATE: 20 BRPM | DIASTOLIC BLOOD PRESSURE: 70 MMHG

## 2023-10-29 DIAGNOSIS — R06.02 SHORTNESS OF BREATH: ICD-10-CM

## 2023-10-29 DIAGNOSIS — R42 LIGHTHEADED: ICD-10-CM

## 2023-10-29 DIAGNOSIS — R00.2 PALPITATIONS: Primary | ICD-10-CM

## 2023-10-29 DIAGNOSIS — R07.89 FEELING OF CHEST TIGHTNESS: ICD-10-CM

## 2023-10-29 LAB
ALBUMIN SERPL BCP-MCNC: 4.2 G/DL (ref 3.5–5)
ALP SERPL-CCNC: 33 U/L (ref 34–104)
ALT SERPL W P-5'-P-CCNC: 12 U/L (ref 7–52)
ANION GAP SERPL CALCULATED.3IONS-SCNC: 8 MMOL/L
AST SERPL W P-5'-P-CCNC: 11 U/L (ref 13–39)
ATRIAL RATE: 88 BPM
BASOPHILS # BLD AUTO: 0.02 THOUSANDS/ÂΜL (ref 0–0.1)
BASOPHILS NFR BLD AUTO: 0 % (ref 0–1)
BILIRUB SERPL-MCNC: 0.45 MG/DL (ref 0.2–1)
BUN SERPL-MCNC: 12 MG/DL (ref 5–25)
CALCIUM SERPL-MCNC: 9.3 MG/DL (ref 8.4–10.2)
CARDIAC TROPONIN I PNL SERPL HS: <2 NG/L
CHLORIDE SERPL-SCNC: 106 MMOL/L (ref 96–108)
CO2 SERPL-SCNC: 22 MMOL/L (ref 21–32)
CREAT SERPL-MCNC: 0.71 MG/DL (ref 0.6–1.3)
EOSINOPHIL # BLD AUTO: 0.02 THOUSAND/ÂΜL (ref 0–0.61)
EOSINOPHIL NFR BLD AUTO: 0 % (ref 0–6)
ERYTHROCYTE [DISTWIDTH] IN BLOOD BY AUTOMATED COUNT: 12.1 % (ref 11.6–15.1)
GFR SERPL CREATININE-BSD FRML MDRD: 111 ML/MIN/1.73SQ M
GLUCOSE SERPL-MCNC: 101 MG/DL (ref 65–140)
HCT VFR BLD AUTO: 38.8 % (ref 34.8–46.1)
HGB BLD-MCNC: 13.3 G/DL (ref 11.5–15.4)
IMM GRANULOCYTES # BLD AUTO: 0.02 THOUSAND/UL (ref 0–0.2)
IMM GRANULOCYTES NFR BLD AUTO: 0 % (ref 0–2)
LYMPHOCYTES # BLD AUTO: 2.23 THOUSANDS/ÂΜL (ref 0.6–4.47)
LYMPHOCYTES NFR BLD AUTO: 32 % (ref 14–44)
MAGNESIUM SERPL-MCNC: 2 MG/DL (ref 1.9–2.7)
MCH RBC QN AUTO: 28.7 PG (ref 26.8–34.3)
MCHC RBC AUTO-ENTMCNC: 34.3 G/DL (ref 31.4–37.4)
MCV RBC AUTO: 84 FL (ref 82–98)
MONOCYTES # BLD AUTO: 0.56 THOUSAND/ÂΜL (ref 0.17–1.22)
MONOCYTES NFR BLD AUTO: 8 % (ref 4–12)
NEUTROPHILS # BLD AUTO: 4.1 THOUSANDS/ÂΜL (ref 1.85–7.62)
NEUTS SEG NFR BLD AUTO: 60 % (ref 43–75)
NRBC BLD AUTO-RTO: 0 /100 WBCS
P AXIS: 22 DEGREES
PLATELET # BLD AUTO: 235 THOUSANDS/UL (ref 149–390)
PMV BLD AUTO: 10.1 FL (ref 8.9–12.7)
POTASSIUM SERPL-SCNC: 4 MMOL/L (ref 3.5–5.3)
PR INTERVAL: 112 MS
PROT SERPL-MCNC: 7.3 G/DL (ref 6.4–8.4)
QRS AXIS: 73 DEGREES
QRSD INTERVAL: 94 MS
QT INTERVAL: 348 MS
QTC INTERVAL: 421 MS
RBC # BLD AUTO: 4.63 MILLION/UL (ref 3.81–5.12)
SODIUM SERPL-SCNC: 136 MMOL/L (ref 135–147)
T WAVE AXIS: 34 DEGREES
TSH SERPL DL<=0.05 MIU/L-ACNC: 1.25 UIU/ML (ref 0.45–4.5)
VENTRICULAR RATE: 88 BPM
WBC # BLD AUTO: 6.95 THOUSAND/UL (ref 4.31–10.16)

## 2023-10-29 PROCEDURE — 93005 ELECTROCARDIOGRAM TRACING: CPT

## 2023-10-29 PROCEDURE — 99285 EMERGENCY DEPT VISIT HI MDM: CPT | Performed by: EMERGENCY MEDICINE

## 2023-10-29 PROCEDURE — 85025 COMPLETE CBC W/AUTO DIFF WBC: CPT | Performed by: EMERGENCY MEDICINE

## 2023-10-29 PROCEDURE — 80053 COMPREHEN METABOLIC PANEL: CPT | Performed by: EMERGENCY MEDICINE

## 2023-10-29 PROCEDURE — 36415 COLL VENOUS BLD VENIPUNCTURE: CPT

## 2023-10-29 PROCEDURE — 83735 ASSAY OF MAGNESIUM: CPT

## 2023-10-29 PROCEDURE — 99285 EMERGENCY DEPT VISIT HI MDM: CPT

## 2023-10-29 PROCEDURE — 71046 X-RAY EXAM CHEST 2 VIEWS: CPT

## 2023-10-29 PROCEDURE — 84484 ASSAY OF TROPONIN QUANT: CPT | Performed by: EMERGENCY MEDICINE

## 2023-10-29 PROCEDURE — 93010 ELECTROCARDIOGRAM REPORT: CPT | Performed by: INTERNAL MEDICINE

## 2023-10-29 PROCEDURE — 84443 ASSAY THYROID STIM HORMONE: CPT

## 2023-10-29 NOTE — Clinical Note
[facility] accompanied Barbara Falcon [facility] to the emergency department on 10/29/2023. Return date if applicable: 20/57/3203        If you have any questions or concerns, please don't hesitate to call.       Ryan Nesbitt MD

## 2023-10-29 NOTE — ED ATTENDING ATTESTATION
10/29/2023  I, Soraya Ross MD, saw and evaluated the patient. I have discussed the patient with the resident/non-physician practitioner and agree with the resident's/non-physician practitioner's findings, Plan of Care, and MDM as documented in the resident's/non-physician practitioner's note, except where noted. All available labs and Radiology studies were reviewed. I was present for key portions of any procedure(s) performed by the resident/non-physician practitioner and I was immediately available to provide assistance. At this point I agree with the current assessment done in the Emergency Department. I have conducted an independent evaluation of this patient a history and physical is as follows:    ED Course         Critical Care Time  Procedures    27 yo female with chest tightness, palpitations since yesterday, intermittently. Pt with fatigue and sob. No cough, no fever. Pt with increased stress recently. Vss, afebrile, lungs cta, rrr, abdomen soft nontender, no chest tenderness, no pedal edema. Cardiac workup, tsh, mg.  Likley noncardiac chest pain.

## 2023-10-30 NOTE — DISCHARGE INSTRUCTIONS
You were seen in the Emergency Department today for chest tightness, shortness of breath, heart palpitations, lightheaded. Your workup was within normal limits. Please follow up with your primary care doctor in a week for re-evaluation. Please return to the Emergency Department if you experience worsening of your current symptoms or any other concerning symptoms.

## 2023-10-30 NOTE — ED PROVIDER NOTES
History  Chief Complaint   Patient presents with    Chest Pain     Patient reports a "fast fluttering" feeling in the left side of her cheat. Patient with increased fatigue and difficulty taking deep breaths      HPI    Patient is a 28 y.o. female with PMHx of migraines who presents to the ED via private vehicle for evaluation of intermittent chest tightness, shortness of breath, heart palpitations, lightheadedness since this morning. Denies any recent illness. Denies any fever, chills, cough, congestion, sore throat, abdominal pain, nausea, vomiting, diarrhea. Denies any increased caffeine consumption. Denies any medication changes. Denies any cardiopulmonary history. 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 Prior to Admission Medications   Prescriptions Last Dose Informant Patient Reported? Taking?    SUMAtriptan (IMITREX) 50 mg tablet   No No   Sig: Take 1 tablet (50 mg total) by mouth once as needed for migraine for up to 1 dose   Tranexamic Acid 650 MG TABS  Self No No   Sig: Take 1 tablet (650 mg total) by mouth 3 (three) times a day   ibuprofen (MOTRIN) 800 mg tablet  Self No No   Sig: Take 1 tablet (800 mg total) by mouth every 8 (eight) hours as needed for mild pain or moderate pain   triamcinolone (KENALOG) 0.1 % cream   No No   Sig: Apply topically 2 (two) times a day   valACYclovir (VALTREX) 1,000 mg tablet   No No   Sig: Take 1 tablet (1,000 mg total) by mouth 2 (two) times a day for 1 day      Facility-Administered Medications: None       Past Medical History:   Diagnosis Date    Headache(784.0)     Middle school    Migraine     Otitis media     Varicella     Visual impairment     Wears contacts       Past Surgical History:   Procedure Laterality Date    WISDOM TOOTH EXTRACTION         Family History   Problem Relation Age of Onset    Heart attack Maternal Grandmother     Heart attack Maternal Grandfather     Hyperlipidemia Mother     Cancer Mother         Breast; leukemia     I have reviewed and agree with the history as documented. E-Cigarette/Vaping    E-Cigarette Use Never User      E-Cigarette/Vaping Substances    Nicotine No     THC No     CBD No     Flavoring No     Other No      Social History     Tobacco Use    Smoking status: Never    Smokeless tobacco: Never   Vaping Use    Vaping Use: Never used   Substance Use Topics    Alcohol use: Yes     Comment: Once or twice a month    Drug use: Never        Review of Systems   Respiratory:  Positive for chest tightness and shortness of breath. Cardiovascular:  Positive for palpitations. Neurological:  Positive for light-headedness. Physical Exam  ED Triage Vitals [10/29/23 1803]   Temperature Pulse Respirations Blood Pressure SpO2   98.5 °F (36.9 °C) 85 22 146/74 98 %      Temp Source Heart Rate Source Patient Position - Orthostatic VS BP Location FiO2 (%)   Temporal Monitor Sitting Left arm --      Pain Score       --             Orthostatic Vital Signs  Vitals:    10/29/23 1803 10/29/23 2036   BP: 146/74 123/70   Pulse: 85 75   Patient Position - Orthostatic VS: Sitting Sitting       Physical Exam  Vitals and nursing note reviewed. Constitutional:       General: She is not in acute distress. Appearance: Normal appearance. She is well-developed. She is not ill-appearing, toxic-appearing or diaphoretic. HENT:      Head: Normocephalic and atraumatic. Mouth/Throat:      Mouth: Mucous membranes are moist.   Eyes:      Conjunctiva/sclera: Conjunctivae normal.   Cardiovascular:      Rate and Rhythm: Normal rate and regular rhythm. Heart sounds: No murmur heard. Pulmonary:      Effort: Pulmonary effort is normal. No respiratory distress. Breath sounds: Normal breath sounds. No stridor. No wheezing, rhonchi or rales. Chest:      Chest wall: No tenderness. Abdominal:      Palpations: Abdomen is soft. Tenderness: There is no abdominal tenderness.    Musculoskeletal:         General: No swelling. Cervical back: Neck supple. Skin:     General: Skin is warm and dry. Capillary Refill: Capillary refill takes less than 2 seconds. Neurological:      General: No focal deficit present. Mental Status: She is alert and oriented to person, place, and time.    Psychiatric:         Mood and Affect: Mood normal.         ED Medications  Medications - No data to display    Diagnostic Studies  Results Reviewed       Procedure Component Value Units Date/Time    TSH, 3rd generation with Free T4 reflex [[id number]]  (Normal) Collected: 10/29/23 1913    Lab Status: Final result Specimen: Blood from Arm, Left Updated: 10/29/23 2056     TSH 3RD GENERATON 1.251 uIU/mL     Magnesium [[id number]]  (Normal) Collected: 10/29/23 1913    Lab Status: Final result Specimen: Blood from Arm, Left Updated: 10/29/23 2028     Magnesium 2.0 mg/dL     HS Troponin 0hr (reflex protocol) [[id number]]  (Normal) Collected: 10/29/23 1913    Lab Status: Final result Specimen: Blood from Arm, Left Updated: 10/29/23 1943     hs TnI 0hr <2 ng/L     Comprehensive metabolic panel [[id number]]  (Abnormal) Collected: 10/29/23 1913    Lab Status: Final result Specimen: Blood from Arm, Left Updated: 10/29/23 1938     Sodium 136 mmol/L      Potassium 4.0 mmol/L      Chloride 106 mmol/L      CO2 22 mmol/L      ANION GAP 8 mmol/L      BUN 12 mg/dL      Creatinine 0.71 mg/dL      Glucose 101 mg/dL      Calcium 9.3 mg/dL      AST 11 U/L      ALT 12 U/L      Alkaline Phosphatase 33 U/L      Total Protein 7.3 g/dL      Albumin 4.2 g/dL      Total Bilirubin 0.45 mg/dL      eGFR 111 ml/min/1.73sq m     Narrative:      [place] guidelines for Chronic Kidney Disease (CKD):     Stage 1 with normal or high GFR (GFR > 90 mL/min/1.73 square meters)    Stage 2 Mild CKD (GFR = 60-89 mL/min/1.73 square meters)    Stage 3A Moderate CKD (GFR = 45-59 mL/min/1.73 square meters)    Stage 3B Moderate CKD (GFR = 30-44 mL/min/1.73 square meters)    Stage 4 Severe CKD (GFR = 15-29 mL/min/1.73 square meters)    Stage 5 End Stage CKD (GFR <15 mL/min/1.73 square meters)  Note: GFR calculation is accurate only with a steady state creatinine    CBC and differential [[id number]] Collected: 10/29/23 1913    Lab Status: Final result Specimen: Blood from Arm, Left Updated: 10/29/23 1920     WBC 6.95 Thousand/uL      RBC 4.63 Million/uL      Hemoglobin 13.3 g/dL      Hematocrit 38.8 %      MCV 84 fL      MCH 28.7 pg      MCHC 34.3 g/dL      RDW 12.1 %      MPV 10.1 fL      Platelets 946 Thousands/uL      nRBC 0 /100 WBCs      Neutrophils Relative 60 %      Immat GRANS % 0 %      Lymphocytes Relative 32 %      Monocytes Relative 8 %      Eosinophils Relative 0 %      Basophils Relative 0 %      Neutrophils Absolute 4.10 Thousands/µL      Immature Grans Absolute 0.02 Thousand/uL      Lymphocytes Absolute 2.23 Thousands/µL      Monocytes Absolute 0.56 Thousand/µL      Eosinophils Absolute 0.02 Thousand/µL      Basophils Absolute 0.02 Thousands/µL                    XR chest 2 views   ED Interpretation by Martinez Yuan MD (10/29 2002)   No acute cardiopulmonary process            Procedures  ECG 12 Lead Documentation Only    Date/Time: 10/29/2023 6:06 PM    Performed by: Martinez Yuan MD  Authorized by:  Martinez Yuan MD    Indications / Diagnosis:  Cp  ECG reviewed by me, the ED Provider: yes    Patient location:  ED  Previous ECG:     Previous ECG:  Compared to current  Interpretation:     Interpretation: normal    Rate:     ECG rate:  88    ECG rate assessment: normal    Rhythm:     Rhythm: sinus rhythm    Ectopy:     Ectopy: none    QRS:     QRS axis:  Normal    QRS intervals:  Normal  Conduction:     Conduction: normal    ST segments:     ST segments:  Normal  T waves:     T waves: normal          ED Course             HEART Risk Score      Flowsheet Row Most Recent Value   Heart Score Risk Calculator    History 0 Filed at: 10/30/2023 0113   ECG 0 Filed at: 10/30/2023 0113   Age 0 Filed at: 10/30/2023 0113   Risk Factors 0 Filed at: 10/30/2023 0113   Troponin 0 Filed at: 10/30/2023 0113   HEART Score 0 Filed at: 10/30/2023 0113                                  Medical Decision Making  Amount and/or Complexity of Data Reviewed  Labs: ordered. Radiology: ordered and independent interpretation performed. ASSESSMENT: Patient is a 28 y.o. female who presents with intermittent chest tightness, lightheaded, shortness of breath, and heart palpations. DDX includes but not limited to: arrhythmia, hyperthyroidism, electrolyte derangement, anxiety. PLAN: CBC, CMP, troponin, magnesium, TSH, EKG, chest x-ray. Work-up unremarkable. Symptoms likely related to stress and anxiety. Stable for discharge. Strict return to ED precautions provided. Advised to follow up with PCP within 1 week for re-evaluation and further management. Patient verbalized understanding and agrees with the plan of care. Disposition  Final diagnoses:   Palpitations   Feeling of chest tightness   Lightheaded   Shortness of breath     Time reflects when diagnosis was documented in both MDM as applicable and the Disposition within this note       Time User Action Codes Description Comment    10/29/2023  9:00 PM Tilman Babe T Add [R00.2] Palpitations     10/29/2023  9:01 PM Tilman Babe T Add [R07.89] Feeling of chest tightness     10/29/2023  9:01 PM Tilman Babe T Add [R42] Lightheaded     10/29/2023  9:01 PM Tilman Babe T Add [R06.02] Shortness of breath           ED Disposition       ED Disposition   Discharge    Condition   Stable    Date/Time   Sun Oct 29, 2023 2100    Comment   Miller Alcantar discharge to home/self care.                    Follow-up Information       Follow up With Specialties Details Why Contact Info Additional [address], [address]   [address]  [address] [address]  [phone]       [address] [place] [address] Emergency Department Emergency Medicine  If symptoms worsen [address] [id number]  [facility] Emergency Department, [address] [facility]            Discharge Medication List as of 10/29/2023  9:02 PM        CONTINUE these medications which have NOT CHANGED    Details   ibuprofen (MOTRIN) 800 mg tablet Take 1 tablet (800 mg total) by mouth every 8 (eight) hours as needed for mild pain or moderate pain, Starting Wed 10/26/2022, Normal      SUMAtriptan (IMITREX) 50 mg tablet Take 1 tablet (50 mg total) by mouth once as needed for migraine for up to 1 dose, Starting Wed 7/26/2023, Normal      Tranexamic Acid 650 MG TABS Take 1 tablet (650 mg total) by mouth 3 (three) times a day, Starting Thu 3/30/2023, Normal      triamcinolone (KENALOG) 0.1 % cream Apply topically 2 (two) times a day, Starting Mon 9/11/2023, Normal      valACYclovir (VALTREX) 1,000 mg tablet Take 1 tablet (1,000 mg total) by mouth 2 (two) times a day for 1 day, Starting Wed 7/26/2023, Until Thu 7/27/2023, Normal           No discharge procedures on file. PDMP Review       None             ED Provider  Attending physically available and evaluated Shruthi Alcantar. I managed the patient along with the ED Attending.     Electronically Signed by           Dominic Stone MD  10/30/23 7881

## 2023-11-08 ENCOUNTER — OFFICE VISIT (OUTPATIENT)
Dept: FAMILY MEDICINE CLINIC | Facility: CLINIC | Age: 33
End: 2023-11-08
Payer: COMMERCIAL

## 2023-11-08 VITALS
BODY MASS INDEX: 29.6 KG/M2 | OXYGEN SATURATION: 98 % | HEIGHT: 66 IN | TEMPERATURE: 98 F | HEART RATE: 83 BPM | DIASTOLIC BLOOD PRESSURE: 78 MMHG | WEIGHT: 184.2 LBS | SYSTOLIC BLOOD PRESSURE: 122 MMHG

## 2023-11-08 DIAGNOSIS — E56.9 VITAMIN DEFICIENCY: ICD-10-CM

## 2023-11-08 DIAGNOSIS — R00.2 PALPITATIONS: ICD-10-CM

## 2023-11-08 DIAGNOSIS — F41.9 ANXIETY: Primary | ICD-10-CM

## 2023-11-08 DIAGNOSIS — E04.9 ENLARGED THYROID: ICD-10-CM

## 2023-11-08 PROCEDURE — 99214 OFFICE O/P EST MOD 30 MIN: CPT | Performed by: NURSE PRACTITIONER

## 2023-11-08 RX ORDER — BUSPIRONE HYDROCHLORIDE 5 MG/1
5 TABLET ORAL 2 TIMES DAILY
Qty: 60 TABLET | Refills: 1 | Status: SHIPPED | OUTPATIENT
Start: 2023-11-08

## 2023-11-08 NOTE — PROGRESS NOTES
[facility]    ASSESSMENT AND PLAN     1. Anxiety  Assessment & Plan:  Symptoms and potential treatment options reviewed  We will trial BuSpar: 5 mg twice daily  Lab work as below  Follow-up 3 to 4 weeks for symptom reassessment  Sooner with problems or concerns    Orders:  -     busPIRone (BUSPAR) 5 mg tablet; Take 1 tablet (5 mg total) by mouth 2 (two) times a day    2. Palpitations  Assessment & Plan:  EKG normal   we will check Holter to assess for PVC/PAC and burden of same  Referral to cardiology if indicated  ER precautions for acute change    Orders:  -     Holter monitor; Future; Expected date: 11/08/2023    3. Vitamin deficiency  -     Vitamin D 25 hydroxy; Future    4. Enlarged thyroid  Comments:  Check US  Orders:  -     US thyroid; Future; Expected date: 11/08/2023           SUBJECTIVE       Patient ID: Niki White is a 28 y.o. female. Chief Complaint   Patient presents with    Anxiety     Is getting worse during the past weeks. HISTORY OF PRESENT ILLNESS    Presents today complaint of increased anxiety. 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 Recently seen in the ER for tightness and palpitations on 10/29. Deemed as anxiety, and advised to follow-up with her PCP. Interested in possibly pursuing medication    ER record reviewed:  HPI   Patient is a 28 y.o. female with PMHx of migraines who presents to the ED via private vehicle for evaluation of intermittent chest tightness, shortness of breath, heart palpitations, lightheadedness since this morning. Denies any recent illness. Denies any fever, chills, cough, congestion, sore throat, abdominal pain, nausea, vomiting, diarrhea. Denies any increased caffeine consumption. Denies any medication changes. Denies any cardiopulmonary history. 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     D/C  Patient is a 28 y.o. female who presents with intermittent chest tightness, lightheaded, shortness of breath, and heart palpations. DDX includes but not limited to: arrhythmia, hyperthyroidism, electrolyte derangement, anxiety. PLAN: CBC, CMP, troponin, magnesium, TSH, EKG, chest x-ray. Work-up unremarkable. Symptoms likely related to stress and anxiety. Stable for discharge. Strict return to ED precautions provided. Advised to follow up with PCP within 1 week for re-evaluation and further management. Patient verbalized understanding and agrees with the plan of care. The following portions of the patient's history were reviewed and updated as appropriate: allergies, current medications, past family history, past medical history, past social history, past surgical history, and problem list.    REVIEW OF SYSTEMS  Review of Systems   Constitutional: Negative. HENT: Negative. Respiratory: Negative. Cardiovascular: Negative. Gastrointestinal: Negative. Genitourinary: Negative. Musculoskeletal: Negative. Neurological: Negative. Psychiatric/Behavioral:  Negative for decreased concentration, dysphoric mood, self-injury, sleep disturbance and suicidal ideas. The patient is nervous/anxious.         OBJECTIVE      VITAL SIGNS  /78 (BP Location: Left arm, Patient Position: Sitting, Cuff Size: Adult)   Pulse 83   Temp 98 °F (36.7 °C)   Ht 5' 6" (1.676 m)   Wt 83.6 kg (184 lb 3.2 oz)   LMP 11/06/2023 (Exact Date)   SpO2 98%   BMI 29.73 kg/m²     CURRENT MEDICATIONS    Current Outpatient Medications:     busPIRone (BUSPAR) 5 mg tablet, Take 1 tablet (5 mg total) by mouth 2 (two) times a day, Disp: 60 tablet, Rfl: 1    ibuprofen (MOTRIN) 800 mg tablet, Take 1 tablet (800 mg total) by mouth every 8 (eight) hours as needed for mild pain or moderate pain (Patient not taking: Reported on 11/8/2023), Disp: 30 tablet, Rfl: 1    SUMAtriptan (IMITREX) 50 mg tablet, Take 1 tablet (50 mg total) by mouth once as needed for migraine for up to 1 dose (Patient not taking: Reported on 11/8/2023), Disp: 9 tablet, Rfl: 0    Tranexamic Acid 650 MG TABS, Take 1 tablet (650 mg total) by mouth 3 (three) times a day (Patient not taking: Reported on 11/8/2023), Disp: 30 tablet, Rfl: 7    triamcinolone (KENALOG) 0.1 % cream, Apply topically 2 (two) times a day (Patient not taking: Reported on 11/8/2023), Disp: 15 g, Rfl: 1    valACYclovir (VALTREX) 1,000 mg tablet, Take 1 tablet (1,000 mg total) by mouth 2 (two) times a day for 1 day (Patient not taking: Reported on 11/8/2023), Disp: 30 tablet, Rfl: 0      PHYSICAL EXAMINATION   Physical Exam  Vitals and nursing note reviewed. Constitutional:       General: She is not in acute distress. Appearance: Normal appearance. She is well-developed and well-groomed. She is not ill-appearing. HENT:      Head: Normocephalic and atraumatic. Right Ear: Tympanic membrane, ear canal and external ear normal.      Left Ear: Tympanic membrane, ear canal and external ear normal.      Mouth/Throat:      Mouth: Mucous membranes are moist.   Eyes:      Pupils: Pupils are equal, round, and reactive to light. Neck:      Vascular: No carotid bruit. Cardiovascular:      Rate and Rhythm: Normal rate and regular rhythm. Pulses:           Posterior tibial pulses are 2+ on the right side and 2+ on the left side. Heart sounds: Normal heart sounds. Pulmonary:      Effort: Pulmonary effort is normal. No respiratory distress. Breath sounds: Normal breath sounds and air entry. Musculoskeletal:      Right lower leg: No edema. Left lower leg: No edema. Lymphadenopathy:      Cervical: No cervical adenopathy. Skin:     General: Skin is warm and dry. Neurological:      General: No focal deficit present. Mental Status: She is alert and oriented to person, place, and time.    Psychiatric:         Attention and Perception: Attention normal.         Mood and Affect: Mood normal.         Behavior: Behavior normal. Thought Content:  Thought content normal.         Judgment: Judgment normal.

## 2023-11-09 ENCOUNTER — HOSPITAL ENCOUNTER (OUTPATIENT)
Dept: RADIOLOGY | Facility: HOSPITAL | Age: 33
Discharge: HOME | End: 2023-11-09
Payer: COMMERCIAL

## 2023-11-09 DIAGNOSIS — E04.9 ENLARGED THYROID: ICD-10-CM

## 2023-11-09 PROCEDURE — 76536 US EXAM OF HEAD AND NECK: CPT

## 2023-11-12 PROBLEM — F41.9 ANXIETY: Status: ACTIVE | Noted: 2023-11-12

## 2023-11-12 PROBLEM — R00.2 PALPITATIONS: Status: ACTIVE | Noted: 2023-11-12

## 2023-11-13 NOTE — ASSESSMENT & PLAN NOTE
EKG normal   we will check Holter to assess for PVC/PAC and burden of same  Referral to cardiology if indicated  ER precautions for acute change

## 2023-11-13 NOTE — ASSESSMENT & PLAN NOTE
Symptoms and potential treatment options reviewed  We will trial BuSpar: 5 mg twice daily  Lab work as below  Follow-up 3 to 4 weeks for symptom reassessment  Sooner with problems or concerns

## 2023-11-15 ENCOUNTER — HOSPITAL ENCOUNTER (OUTPATIENT)
Dept: NON INVASIVE DIAGNOSTICS | Facility: HOSPITAL | Age: 33
Discharge: HOME | End: 2023-11-15
Payer: COMMERCIAL

## 2023-11-15 DIAGNOSIS — R00.2 PALPITATIONS: ICD-10-CM

## 2023-11-15 PROCEDURE — 93225 XTRNL ECG REC<48 HRS REC: CPT

## 2023-11-15 PROCEDURE — 93226 XTRNL ECG REC<48 HR SCAN A/R: CPT

## 2023-12-06 DIAGNOSIS — F41.9 ANXIETY: ICD-10-CM

## 2023-12-06 RX ORDER — BUSPIRONE HYDROCHLORIDE 10 MG/1
10 TABLET ORAL 2 TIMES DAILY
Qty: 60 TABLET | Refills: 1 | Status: SHIPPED | OUTPATIENT
Start: 2023-12-06

## 2023-12-14 DIAGNOSIS — E04.9 ENLARGED THYROID: Primary | ICD-10-CM

## 2023-12-14 DIAGNOSIS — E04.1 THYROID NODULE: ICD-10-CM

## 2023-12-15 ENCOUNTER — TELEPHONE (OUTPATIENT)
Dept: ENDOCRINOLOGY | Facility: CLINIC | Age: 33
End: 2023-12-15

## 2023-12-18 ENCOUNTER — APPOINTMENT (OUTPATIENT)
Dept: LAB | Facility: CLINIC | Age: 33
End: 2023-12-18
Payer: COMMERCIAL

## 2023-12-18 DIAGNOSIS — E04.1 THYROID NODULE: ICD-10-CM

## 2023-12-18 DIAGNOSIS — E04.9 ENLARGED THYROID: ICD-10-CM

## 2023-12-18 DIAGNOSIS — E56.9 VITAMIN DEFICIENCY: ICD-10-CM

## 2023-12-18 LAB
25(OH)D3 SERPL-MCNC: 25.9 NG/ML (ref 30–100)
PTH-INTACT SERPL-MCNC: 26.6 PG/ML (ref 12–88)

## 2023-12-18 PROCEDURE — 36415 COLL VENOUS BLD VENIPUNCTURE: CPT

## 2023-12-18 PROCEDURE — 82306 VITAMIN D 25 HYDROXY: CPT

## 2023-12-18 PROCEDURE — 83970 ASSAY OF PARATHORMONE: CPT

## 2023-12-18 PROCEDURE — 86376 MICROSOMAL ANTIBODY EACH: CPT

## 2023-12-18 PROCEDURE — 86800 THYROGLOBULIN ANTIBODY: CPT

## 2023-12-19 LAB
THYROGLOB AB SERPL-ACNC: <1 IU/ML (ref 0–0.9)
THYROPEROXIDASE AB SERPL-ACNC: 166 IU/ML (ref 0–34)

## 2023-12-28 DIAGNOSIS — F41.9 ANXIETY: ICD-10-CM

## 2023-12-28 RX ORDER — BUSPIRONE HYDROCHLORIDE 10 MG/1
10 TABLET ORAL 2 TIMES DAILY
Qty: 180 TABLET | Refills: 0 | Status: SHIPPED | OUTPATIENT
Start: 2023-12-28

## 2024-01-03 DIAGNOSIS — E55.9 VITAMIN D DEFICIENCY: Primary | ICD-10-CM

## 2024-01-03 RX ORDER — ERGOCALCIFEROL 1.25 MG/1
50000 CAPSULE ORAL WEEKLY
Qty: 8 CAPSULE | Refills: 0 | Status: SHIPPED | OUTPATIENT
Start: 2024-01-03

## 2024-01-04 DIAGNOSIS — Z11.1 SCREENING-PULMONARY TB: Primary | ICD-10-CM

## 2024-01-09 ENCOUNTER — APPOINTMENT (OUTPATIENT)
Dept: LAB | Facility: CLINIC | Age: 34
End: 2024-01-09
Payer: COMMERCIAL

## 2024-01-09 DIAGNOSIS — Z11.1 SCREENING-PULMONARY TB: ICD-10-CM

## 2024-01-09 PROCEDURE — 36415 COLL VENOUS BLD VENIPUNCTURE: CPT

## 2024-01-09 PROCEDURE — 86480 TB TEST CELL IMMUN MEASURE: CPT

## 2024-01-10 LAB
GAMMA INTERFERON BACKGROUND BLD IA-ACNC: <0 IU/ML
M TB IFN-G BLD-IMP: NEGATIVE
M TB IFN-G CD4+ BCKGRND COR BLD-ACNC: 0 IU/ML
M TB IFN-G CD4+ BCKGRND COR BLD-ACNC: 0.01 IU/ML
MITOGEN IGNF BCKGRD COR BLD-ACNC: 10 IU/ML

## 2024-01-30 DIAGNOSIS — E55.9 VITAMIN D DEFICIENCY: ICD-10-CM

## 2024-01-30 RX ORDER — ERGOCALCIFEROL 1.25 MG/1
50000 CAPSULE ORAL WEEKLY
Qty: 4 CAPSULE | Refills: 0 | Status: SHIPPED | OUTPATIENT
Start: 2024-01-30

## 2024-02-05 ENCOUNTER — CONSULT (OUTPATIENT)
Dept: ENDOCRINOLOGY | Facility: CLINIC | Age: 34
End: 2024-02-05
Payer: COMMERCIAL

## 2024-02-05 VITALS
HEIGHT: 66 IN | BODY MASS INDEX: 29.99 KG/M2 | DIASTOLIC BLOOD PRESSURE: 78 MMHG | SYSTOLIC BLOOD PRESSURE: 120 MMHG | WEIGHT: 186.6 LBS | OXYGEN SATURATION: 99 % | HEART RATE: 91 BPM

## 2024-02-05 DIAGNOSIS — E04.1 THYROID NODULE: ICD-10-CM

## 2024-02-05 DIAGNOSIS — E06.3 HASHIMOTO'S THYROIDITIS: Primary | ICD-10-CM

## 2024-02-05 DIAGNOSIS — E55.9 VITAMIN D DEFICIENCY: ICD-10-CM

## 2024-02-05 DIAGNOSIS — E04.9 ENLARGED THYROID: ICD-10-CM

## 2024-02-05 DIAGNOSIS — R74.8 LOW SERUM ALKALINE PHOSPHATASE: ICD-10-CM

## 2024-02-05 PROCEDURE — 99244 OFF/OP CNSLTJ NEW/EST MOD 40: CPT | Performed by: INTERNAL MEDICINE

## 2024-02-05 NOTE — ASSESSMENT & PLAN NOTE
Advised to start vitamin D3 2000 international units daily once she finishes her course of Drisdol

## 2024-02-05 NOTE — PROGRESS NOTES
Shruthi DESMOND [facility] 34 y.o. female MRN: [id number]    Encounter: [id number]      Assessment/Plan     Problem List Items Addressed This Visit          Endocrine    Hashimoto's thyroiditis - Primary     Thyroid function tests are normal-she is at increased risk for developing hypothyroidism-will monitor, repeat thyroid function test in 4 months and if normal then she should have it checked on a yearly basis         Relevant Orders    TSH, 3rd generation    T4, free    Thyroid nodule     Does not meet criteria for fine-needle aspiration biopsy-will repeat thyroid ultrasound in 1 year         Relevant Orders    US thyroid       Other    Low serum alkaline phosphatase     No history of small bone fractures, no dental issues.  Will repeat and also check bone specific alkaline phosphatase         Relevant Orders    Comprehensive metabolic panel    Alkaline phosphatase, bone specific    PTH, intact    Phosphorus    Vitamin D deficiency     Advised to start vitamin D3 2000 international units daily once she finishes her course of Drisdol         Relevant Orders    Vitamin D 25 hydroxy     Other Visit Diagnoses       Enlarged thyroid                 CC:   Thyroid nodule    History of Present Illness     HPI:  34-year-old female referred here for evaluation of thyroid dysfunction-she was noted to have thyromegaly on physical exam and underwent imaging as well as lab testing.  She was having anxiety which has improved with medications     No neck symptoms     No FH of thyroid cancer   No history of RT to neck    C/o fatigue  , not sleeping well  - used to work nights    No weight gain   No constipation     Menstrual cycles heavy but regular   C/o maylagias      Review of Systems    Historical Information   Past Medical History:   Diagnosis Date    Headache(784.0)     Middle school    Migraine     Otitis media     Varicella     Visual impairment     Wears contacts     Past Surgical History:   Procedure Laterality Date    WISDOM  "TOOTH EXTRACTION       Social History   Social History     Substance and Sexual Activity   Alcohol Use Yes    Comment: Once or twice a month     Social History     Substance and Sexual Activity   Drug Use Never     Social History     Tobacco Use   Smoking Status Never   Smokeless Tobacco Never     Family History:   Family History   Problem Relation Age of Onset    Hyperlipidemia Mother     Cancer Mother         Breast; leukemia    Hypertension Father     Heart attack Maternal Grandmother     Heart attack Maternal Grandfather     Lupus Family        Meds/Allergies   Current Outpatient Medications   Medication Sig Dispense Refill    busPIRone (BUSPAR) 10 mg tablet TAKE 1 TABLET BY MOUTH TWICE A  tablet 0    ergocalciferol (VITAMIN D2) 50,000 units Take 1 capsule (50,000 Units total) by mouth once a week 4 capsule 0    ibuprofen (MOTRIN) 800 mg tablet Take 1 tablet (800 mg total) by mouth every 8 (eight) hours as needed for mild pain or moderate pain 30 tablet 1    SUMAtriptan (IMITREX) 50 mg tablet Take 1 tablet (50 mg total) by mouth once as needed for migraine for up to 1 dose 9 tablet 0    Tranexamic Acid 650 MG TABS Take 1 tablet (650 mg total) by mouth 3 (three) times a day 30 tablet 7    triamcinolone (KENALOG) 0.1 % cream Apply topically 2 (two) times a day (Patient not taking: Reported on 11/8/2023) 15 g 1    valACYclovir (VALTREX) 1,000 mg tablet Take 1 tablet (1,000 mg total) by mouth 2 (two) times a day for 1 day (Patient not taking: Reported on 11/8/2023) 30 tablet 0     No current facility-administered medications for this visit.     No Known Allergies    Objective   Vitals: Blood pressure 120/78, pulse 91, height 5' 6\" (1.676 m), weight 84.6 kg (186 lb 9.6 oz), SpO2 99%, not currently breastfeeding.    Physical Exam  Vitals reviewed.   Constitutional:       General: She is not in acute distress.     Appearance: Normal appearance. She is not ill-appearing, toxic-appearing or diaphoretic.   HENT:    "   Head: Normocephalic and atraumatic.   Eyes:      General: No scleral icterus.     Extraocular Movements: Extraocular movements intact.   Neck:      Comments: Thyroid gland mildly enlarged-no distinct nodule palpated-  Cardiovascular:      Rate and Rhythm: Normal rate and regular rhythm.      Heart sounds: Normal heart sounds. No murmur heard.  Pulmonary:      Effort: Pulmonary effort is normal. No respiratory distress.      Breath sounds: Normal breath sounds. No wheezing or rales.   Musculoskeletal:      Cervical back: Neck supple.      Right lower leg: No edema.      Left lower leg: No edema.   Lymphadenopathy:      Cervical: No cervical adenopathy.   Skin:     General: Skin is warm and dry.   Neurological:      General: No focal deficit present.      Mental Status: She is alert and oriented to person, place, and time.      Gait: Gait normal.   Psychiatric:         Mood and Affect: Mood normal.         Behavior: Behavior normal.         Thought Content: Thought content normal.         Judgment: Judgment normal.         The history was obtained from the review of the chart, patient.    Lab Results:   Lab Results   Component Value Date/Time    TSH 3RD GENERATON 1.251 10/29/2023 07:13 PM    TSH 3RD GENERATON 1.395 09/01/2023 07:57 AM       Imaging Studies:   Results for orders placed during the hospital encounter of 11/09/23    US thyroid    Impression  1. No nodule meets current ACR criteria for requiring biopsy or followup ultrasounds.  2. Heterogeneous hypervascular thyroid gland, could be due to thyroiditis. Recommend correlation with serum markers if not previously obtained.  3. Nodule adjacent to the inferior right lobe of the thyroid gland posteriorly may represent a parathyroid gland. Correlate with serum markers.        Reference: ACR Thyroid Imaging, Reporting and Data System (TI-RADS): White Paper of the ACR TI-RADS Committee. J AM Mark Radiol 2017;14:587-595. (additional recommendations based on  "American Thyroid Association 2015 guidelines.)      Workstation performed: [id number]      I have personally reviewed pertinent reports.      Portions of the record may have been created with voice recognition software. Occasional wrong word or \"sound a like\" substitutions may have occurred due to the inherent limitations of voice recognition software. Read the chart carefully and recognize, using context, where substitutions have occurred.    "

## 2024-02-05 NOTE — ASSESSMENT & PLAN NOTE
No history of small bone fractures, no dental issues.  Will repeat and also check bone specific alkaline phosphatase

## 2024-02-05 NOTE — ASSESSMENT & PLAN NOTE
Thyroid function tests are normal-she is at increased risk for developing hypothyroidism-will monitor, repeat thyroid function test in 4 months and if normal then she should have it checked on a yearly basis

## 2024-02-20 ENCOUNTER — DOCUMENTATION (OUTPATIENT)
Dept: FAMILY MEDICINE CLINIC | Facility: CLINIC | Age: 34
End: 2024-02-20

## 2024-02-20 DIAGNOSIS — E55.9 VITAMIN D DEFICIENCY: Primary | ICD-10-CM

## 2024-02-20 RX ORDER — ERGOCALCIFEROL 1.25 MG/1
CAPSULE ORAL
Qty: 12 CAPSULE | Refills: 0 | Status: SHIPPED | OUTPATIENT
Start: 2024-02-20

## 2024-02-22 DIAGNOSIS — F41.9 ANXIETY: ICD-10-CM

## 2024-02-22 RX ORDER — BUSPIRONE HYDROCHLORIDE 10 MG/1
10 TABLET ORAL 2 TIMES DAILY
Qty: 180 TABLET | Refills: 0 | Status: SHIPPED | OUTPATIENT
Start: 2024-02-22

## 2024-03-19 DIAGNOSIS — G43.809 OTHER MIGRAINE WITHOUT STATUS MIGRAINOSUS, NOT INTRACTABLE: ICD-10-CM

## 2024-03-19 RX ORDER — IBUPROFEN 800 MG/1
800 TABLET ORAL EVERY 8 HOURS PRN
Qty: 30 TABLET | Refills: 2 | Status: SHIPPED | OUTPATIENT
Start: 2024-03-19

## 2024-04-01 ENCOUNTER — ANNUAL EXAM (OUTPATIENT)
Dept: OBGYN CLINIC | Facility: CLINIC | Age: 34
End: 2024-04-01
Payer: COMMERCIAL

## 2024-04-01 VITALS
BODY MASS INDEX: 29.51 KG/M2 | HEIGHT: 66 IN | SYSTOLIC BLOOD PRESSURE: 118 MMHG | WEIGHT: 183.6 LBS | DIASTOLIC BLOOD PRESSURE: 78 MMHG

## 2024-04-01 DIAGNOSIS — Z01.419 ENCOUNTER FOR ANNUAL ROUTINE GYNECOLOGICAL EXAMINATION: Primary | ICD-10-CM

## 2024-04-01 DIAGNOSIS — N92.0 MENORRHAGIA WITH REGULAR CYCLE: ICD-10-CM

## 2024-04-01 PROCEDURE — S0612 ANNUAL GYNECOLOGICAL EXAMINA: HCPCS | Performed by: OBSTETRICS & GYNECOLOGY

## 2024-04-01 RX ORDER — TRANEXAMIC ACID 650 MG/1
650 TABLET ORAL 3 TIMES DAILY
Qty: 30 TABLET | Refills: 7 | Status: SHIPPED | OUTPATIENT
Start: 2024-04-01

## 2024-04-01 NOTE — PROGRESS NOTES
Assessment/Plan:  Pap smear deferred due to low risk status.  Encouraged self breast examination as well as calcium supplementation.  Continue Lysteda as needed x 1 year.  Return to office in 1 year or as needed  No problem-specific Assessment & Plan notes found for this encounter.       Diagnoses and all orders for this visit:    Encounter for annual routine gynecological examination    Menorrhagia with regular cycle  -     Tranexamic Acid 650 MG TABS; Take 1 tablet (650 mg total) by mouth 3 (three) times a day    Other orders  -     Multiple Vitamins-Minerals (MULTI ADULT GUMMIES PO); Take by mouth daily          Subjective:      Patient ID: Shruthi Alcantar is a 34 y.o. female.    HPI    This is a pleasant 34-year-old female G0 who presents for GYN exam.  Her menstrual cycles are regular every 28 days lasting 6 days with no breakthrough bleeding.  She is using lysteda 2 days twice daily per month with much improvement.  Patient is single.  She has never been sexually active.  She did receive the Gardasil vaccine.  Pap smears been normal.    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    Review of Systems   Constitutional:  Negative for fatigue, fever and unexpected weight change.   Respiratory:  Negative for cough, chest tightness, shortness of breath and wheezing.    Cardiovascular: Negative.  Negative for chest pain and palpitations.   Gastrointestinal: Negative.  Negative for abdominal distention, abdominal pain, blood in stool, constipation, diarrhea, nausea and vomiting.   Genitourinary: Negative.  Negative for difficulty urinating, dyspareunia, dysuria, flank pain, frequency, genital sores, hematuria, pelvic pain, urgency,  "vaginal bleeding, vaginal discharge and vaginal pain.   Skin:  Negative for rash.         Objective:      /78   Ht 5' 6\" (1.676 m)   Wt 83.3 kg (183 lb 9.6 oz)   LMP 03/20/2024 (Exact Date)   BMI 29.63 kg/m²          Physical Exam  Constitutional:       Appearance: Normal appearance. She is well-developed.   HENT:      Head: Normocephalic and atraumatic.   Cardiovascular:      Rate and Rhythm: Normal rate and regular rhythm.   Pulmonary:      Effort: Pulmonary effort is normal.      Breath sounds: Normal breath sounds.   Chest:   Breasts:     Right: No inverted nipple, mass, nipple discharge, skin change or tenderness.      Left: No inverted nipple, mass, nipple discharge, skin change or tenderness.   Abdominal:      General: Bowel sounds are normal. There is no distension.      Palpations: Abdomen is soft.      Tenderness: There is no abdominal tenderness. There is no guarding or rebound.   Genitourinary:     Labia:         Right: No rash, tenderness or lesion.         Left: No rash, tenderness or lesion.       Vagina: Normal. No signs of injury. No vaginal discharge or tenderness.      Cervix: No cervical motion tenderness, discharge, friability, lesion or cervical bleeding.      Uterus: Not enlarged and not tender.       Adnexa:         Right: No mass, tenderness or fullness.          Left: No mass, tenderness or fullness.     Neurological:      Mental Status: She is alert.   Psychiatric:         Behavior: Behavior normal.           "

## 2024-04-24 DIAGNOSIS — F41.9 ANXIETY: ICD-10-CM

## 2024-04-24 RX ORDER — BUSPIRONE HYDROCHLORIDE 10 MG/1
10 TABLET ORAL 3 TIMES DAILY
Qty: 270 TABLET | Refills: 0 | Status: SHIPPED | OUTPATIENT
Start: 2024-04-24

## 2024-04-29 ENCOUNTER — OFFICE VISIT (OUTPATIENT)
Dept: FAMILY MEDICINE CLINIC | Facility: CLINIC | Age: 34
End: 2024-04-29
Payer: COMMERCIAL

## 2024-04-29 ENCOUNTER — APPOINTMENT (OUTPATIENT)
Dept: LAB | Facility: CLINIC | Age: 34
End: 2024-04-29
Payer: COMMERCIAL

## 2024-04-29 VITALS
SYSTOLIC BLOOD PRESSURE: 122 MMHG | WEIGHT: 178 LBS | OXYGEN SATURATION: 99 % | HEART RATE: 95 BPM | BODY MASS INDEX: 28.73 KG/M2 | DIASTOLIC BLOOD PRESSURE: 78 MMHG | TEMPERATURE: 99.8 F

## 2024-04-29 DIAGNOSIS — R10.84 GENERALIZED ABDOMINAL PAIN: Primary | ICD-10-CM

## 2024-04-29 DIAGNOSIS — K21.9 GASTROESOPHAGEAL REFLUX DISEASE, UNSPECIFIED WHETHER ESOPHAGITIS PRESENT: ICD-10-CM

## 2024-04-29 DIAGNOSIS — R10.84 GENERALIZED ABDOMINAL PAIN: ICD-10-CM

## 2024-04-29 LAB
BASOPHILS # BLD AUTO: 0.05 THOUSANDS/ÂΜL (ref 0–0.1)
BASOPHILS NFR BLD AUTO: 1 % (ref 0–1)
EOSINOPHIL # BLD AUTO: 0.03 THOUSAND/ÂΜL (ref 0–0.61)
EOSINOPHIL NFR BLD AUTO: 0 % (ref 0–6)
ERYTHROCYTE [DISTWIDTH] IN BLOOD BY AUTOMATED COUNT: 12.2 % (ref 11.6–15.1)
HCT VFR BLD AUTO: 41.5 % (ref 34.8–46.1)
HGB BLD-MCNC: 13.9 G/DL (ref 11.5–15.4)
IMM GRANULOCYTES # BLD AUTO: 0.03 THOUSAND/UL (ref 0–0.2)
IMM GRANULOCYTES NFR BLD AUTO: 0 % (ref 0–2)
LYMPHOCYTES # BLD AUTO: 2.2 THOUSANDS/ÂΜL (ref 0.6–4.47)
LYMPHOCYTES NFR BLD AUTO: 24 % (ref 14–44)
MCH RBC QN AUTO: 29.4 PG (ref 26.8–34.3)
MCHC RBC AUTO-ENTMCNC: 33.5 G/DL (ref 31.4–37.4)
MCV RBC AUTO: 88 FL (ref 82–98)
MONOCYTES # BLD AUTO: 0.47 THOUSAND/ÂΜL (ref 0.17–1.22)
MONOCYTES NFR BLD AUTO: 5 % (ref 4–12)
NEUTROPHILS # BLD AUTO: 6.59 THOUSANDS/ÂΜL (ref 1.85–7.62)
NEUTS SEG NFR BLD AUTO: 70 % (ref 43–75)
NRBC BLD AUTO-RTO: 0 /100 WBCS
PLATELET # BLD AUTO: 304 THOUSANDS/UL (ref 149–390)
PMV BLD AUTO: 10.4 FL (ref 8.9–12.7)
RBC # BLD AUTO: 4.72 MILLION/UL (ref 3.81–5.12)
WBC # BLD AUTO: 9.37 THOUSAND/UL (ref 4.31–10.16)

## 2024-04-29 PROCEDURE — 99213 OFFICE O/P EST LOW 20 MIN: CPT | Performed by: NURSE PRACTITIONER

## 2024-04-29 PROCEDURE — 85025 COMPLETE CBC W/AUTO DIFF WBC: CPT

## 2024-04-29 PROCEDURE — 36415 COLL VENOUS BLD VENIPUNCTURE: CPT

## 2024-04-29 RX ORDER — CHOLECALCIFEROL (VITAMIN D3) 50 MCG
2000 TABLET ORAL DAILY
COMMUNITY

## 2024-04-29 NOTE — PROGRESS NOTES
[facility]    ASSESSMENT AND PLAN     1. Generalized abdominal pain  Assessment & Plan:  Physical assessment unremarkable  Differentials reviewed  Plan:  Check CBC  Check Abdominal US  Referral to GI    Journal diet/activity diary prior to specialist    Orders:  -     US abdomen complete; Future; Expected date: 04/29/2024  -     CBC and differential; Future  -     Ambulatory Referral to Gastroenterology; Future    2. Gastroesophageal reflux disease, unspecified whether esophagitis present  -     Ambulatory Referral to Gastroenterology; Future           SUBJECTIVE       Patient ID: Shruthi Alcantar is a 34 y.o. female.    Chief Complaint   Patient presents with    Abdominal Pain     Started about 10 days ago  Intermittent episodes of pain  Right side and epigastric  Pain level 5 when episodes happen  No correlation with or drink intake       HISTORY OF PRESENT ILLNESS    Acute visit  Presents today with abdominal pain. Intermittent in nature.  She reports she has had GERD like symptoms for the past several weeks/months.  OTC Pepcid ineffective.  But did get some relief with omeprazole.  Describes this pain as primarily epigastric, but did note to radiate to both her left and right upper abdomen at times.  Notes increased belching. Occasional nausea.  No vomiting. No fevers.  Most recently, she has noted some lower abdominal pain.  This is different than the upper abdominal pain she was experiencing previously.  This pain is primarily on the right.  She initially thought it was an ovarian cyst (which she says she has had in the past).  Usually that pain stops after her menses, but she did recently have period with only mild changes to the symptoms.  She reports most recently the pain has traveled up more into the upper right quadrant again.  She cannot correlate with any particular foods.  Denies any fevers. The pain is intermittent. She cannot reproduce the pain with movement. Notes BM softer recently.  Brown in color. No blood. No mucus    Abdominal Pain  This is a new problem. The current episode started more than 1 month ago. The onset quality is gradual. The problem occurs every several days. The problem has been gradually worsening. The pain is located in the epigastric region. The pain is at a severity of 4/10. The quality of the pain is aching and burning. The abdominal pain radiates to the RUQ. Associated symptoms include belching and nausea. Pertinent negatives include no anorexia, arthralgias, constipation, diarrhea, dysuria, fever, flatus, frequency, headaches, hematochezia, hematuria, melena, myalgias, vomiting or weight loss. Nothing aggravates the pain. The pain is relieved by Nothing.         The following portions of the patient's history were reviewed and updated as appropriate: allergies, current medications, past family history, past medical history, past social history, past surgical history, and problem list.    REVIEW OF SYSTEMS  Review of Systems   Constitutional:  Negative for fever and weight loss.   Gastrointestinal:  Positive for abdominal pain and nausea. Negative for abdominal distention, anorexia, blood in stool, constipation, diarrhea, flatus, hematochezia, melena and vomiting.        GI symptoms as in HPI   Genitourinary:  Negative for dysuria, frequency and hematuria.   Musculoskeletal:  Negative for arthralgias and myalgias.   Neurological:  Negative for headaches.       OBJECTIVE      VITAL SIGNS  /78 (BP Location: Left arm, Patient Position: Sitting, Cuff Size: Standard)   Pulse 95   Temp 99.8 °F (37.7 °C) (Temporal)   Wt 80.7 kg (178 lb)   LMP 03/20/2024 (Exact Date)   SpO2 99%   BMI 28.73 kg/m²     CURRENT MEDICATIONS    Current Outpatient Medications:     busPIRone (BUSPAR) 10 mg tablet, Take 1 tablet (10 mg total) by mouth 3 (three) times a day, Disp: 270 tablet, Rfl: 0    Cholecalciferol (Vitamin D) 50 MCG (2000 UT) tablet, Take 2,000 Units by mouth daily, Disp:  , Rfl:     ibuprofen (MOTRIN) 800 mg tablet, Take 1 tablet (800 mg total) by mouth every 8 (eight) hours as needed for mild pain or moderate pain, Disp: 30 tablet, Rfl: 2    Multiple Vitamins-Minerals (MULTI ADULT GUMMIES PO), Take by mouth daily, Disp: , Rfl:     Probiotic Product (PROBIOTIC-10 PO), Take 1 capsule by mouth in the morning, Disp: , Rfl:     SUMAtriptan (IMITREX) 50 mg tablet, Take 1 tablet (50 mg total) by mouth once as needed for migraine for up to 1 dose, Disp: 9 tablet, Rfl: 0    Tranexamic Acid 650 MG TABS, Take 1 tablet (650 mg total) by mouth 3 (three) times a day, Disp: 30 tablet, Rfl: 7    ergocalciferol (VITAMIN D2) 50,000 units, TAKE 1 CAPSULE BY MOUTH ONE TIME PER WEEK, Disp: 12 capsule, Rfl: 0    triamcinolone (KENALOG) 0.1 % cream, Apply topically 2 (two) times a day (Patient not taking: Reported on 11/8/2023), Disp: 15 g, Rfl: 1    valACYclovir (VALTREX) 1,000 mg tablet, Take 1 tablet (1,000 mg total) by mouth 2 (two) times a day for 1 day (Patient not taking: Reported on 11/8/2023), Disp: 30 tablet, Rfl: 0      PHYSICAL EXAMINATION   Physical Exam  Vitals and nursing note reviewed.   Constitutional:       General: She is not in acute distress.     Appearance: Normal appearance. She is well-developed. She is not ill-appearing.   HENT:      Head: Normocephalic.   Cardiovascular:      Rate and Rhythm: Normal rate and regular rhythm.      Heart sounds: Normal heart sounds.   Pulmonary:      Effort: Pulmonary effort is normal. No respiratory distress.      Breath sounds: Normal breath sounds and air entry.   Abdominal:      General: Bowel sounds are normal.      Palpations: Abdomen is soft.      Tenderness: There is no abdominal tenderness. There is no guarding or rebound.   Skin:     General: Skin is warm and dry.   Neurological:      General: No focal deficit present.      Mental Status: She is alert and oriented to person, place, and time.   Psychiatric:         Attention and Perception:  Attention normal.         Mood and Affect: Mood normal.         Behavior: Behavior normal.

## 2024-04-29 NOTE — ASSESSMENT & PLAN NOTE
Physical assessment unremarkable  Differentials reviewed  Plan:  Check CBC  Check Abdominal US  Referral to GI    Journal diet/activity diary prior to specialist

## 2024-05-06 ENCOUNTER — HOSPITAL ENCOUNTER (OUTPATIENT)
Dept: RADIOLOGY | Facility: HOSPITAL | Age: 34
Discharge: HOME | End: 2024-05-06
Payer: COMMERCIAL

## 2024-05-06 DIAGNOSIS — R10.84 GENERALIZED ABDOMINAL PAIN: ICD-10-CM

## 2024-05-06 PROCEDURE — 76700 US EXAM ABDOM COMPLETE: CPT

## 2024-05-09 ENCOUNTER — PREP FOR PROCEDURE (OUTPATIENT)
Dept: GASTROENTEROLOGY | Facility: CLINIC | Age: 34
End: 2024-05-09

## 2024-05-09 ENCOUNTER — CONSULT (OUTPATIENT)
Dept: GASTROENTEROLOGY | Facility: CLINIC | Age: 34
End: 2024-05-09
Payer: COMMERCIAL

## 2024-05-09 VITALS
SYSTOLIC BLOOD PRESSURE: 120 MMHG | HEIGHT: 66 IN | BODY MASS INDEX: 28.73 KG/M2 | DIASTOLIC BLOOD PRESSURE: 74 MMHG | TEMPERATURE: 98.7 F | WEIGHT: 178.8 LBS

## 2024-05-09 DIAGNOSIS — R10.84 GENERALIZED ABDOMINAL PAIN: ICD-10-CM

## 2024-05-09 DIAGNOSIS — R10.13 EPIGASTRIC PAIN: Primary | ICD-10-CM

## 2024-05-09 DIAGNOSIS — K21.9 GASTROESOPHAGEAL REFLUX DISEASE, UNSPECIFIED WHETHER ESOPHAGITIS PRESENT: ICD-10-CM

## 2024-05-09 PROCEDURE — 99244 OFF/OP CNSLTJ NEW/EST MOD 40: CPT | Performed by: PHYSICIAN ASSISTANT

## 2024-05-09 RX ORDER — FAMOTIDINE 40 MG/5ML
20 POWDER, FOR SUSPENSION ORAL 2 TIMES DAILY
COMMUNITY

## 2024-05-09 RX ORDER — OMEPRAZOLE 20 MG/1
20 CAPSULE, DELAYED RELEASE ORAL DAILY
Qty: 30 CAPSULE | Refills: 2 | Status: SHIPPED | OUTPATIENT
Start: 2024-05-09

## 2024-05-09 RX ORDER — OMEPRAZOLE 20 MG/1
20 TABLET, DELAYED RELEASE ORAL DAILY
COMMUNITY
End: 2024-05-09

## 2024-05-09 NOTE — PROGRESS NOTES
[facility] - Outpatient Consultation  Shruthi LOGAN [facility] 34 y.o. female MRN: [id number]  Encounter: [id number]          ASSESSMENT AND PLAN:       Erna is a 33 y/o female who presents for consultation of GERD.     Upper abdominal pain  Nausea   NSAID use  Chronic constipation   Pt says that since December, she has had intermittent episodes of epigastric pain + nausea but this has recently worsened and is occurring more frequently. She has interchanged 2 weeks of pepcid and 2 weeks of PPI since then with some relief. She says she is usually chronically constipated but over the last week or so, she has actually been having soft-formed Bms daily.  Abdominal u/s was completed but results pending. CBC WNL. Pt says she was taking NSAIDs at least 3 times/week prior to this starting, but has stopped since.   -please get thyroid lab work done that was ordered by Dr. Jones   -celiac serologies and CMP ordered  -await abdominal u/s  -EGD ordered to rule out H.pylori, ulcer disease, celiac, hiatal hernia   -start omeprazole 20 mg daily  -anti-gerd diet discussed and handout printed  - increase daily water intake  -explained that if she becomes constipated again, I would want her to let us know as this could worsen her symptoms  -continue NSAID cessation   ______________________________________________________________________    HPI:  Erna is a 33 y/o female who presents for consultation of GERD. Pt says that since December, she has had intermittent episodes of epigastric pain + nausea but this has recently worsened and is occurring more frequently. She has interchanged 2 weeks of pepcid and 2 weeks of PPI since then with some relief. She says she is usually chronically constipated but over the last week or so, she has actually been having soft-formed Bms daily. Pt says she was taking NSAIDs at least 3 times/week prior to this starting, but has stopped since. She denies immediate family hx of  colon cancer, unintentional weight loss, fevers, chills, night sweats, trouble swallowing, heartburn, bloody or black BM. Pt denies tobacco and alcohol and illicit drug use. Pt denies prior abdominal surgical hx. Pt is not on blood thinners.       REVIEW OF SYSTEMS:    CONSTITUTIONAL: Denies any fever, chills, rigors, and weight loss.  HEENT: No earache or tinnitus. Denies hearing loss or visual disturbances.  CARDIOVASCULAR: No chest pain or palpitations.   RESPIRATORY: Denies any cough, hemoptysis, shortness of breath or dyspnea on exertion.  GASTROINTESTINAL: As noted in the History of Present Illness.   GENITOURINARY: No problems with urination. Denies any hematuria or dysuria.  NEUROLOGIC: No dizziness or vertigo, denies headaches.   MUSCULOSKELETAL: Denies any muscle or joint pain.   SKIN: Denies skin rashes or itching.   ENDOCRINE: Denies excessive thirst. Denies intolerance to heat or cold.  PSYCHOSOCIAL: Denies depression or anxiety. Denies any recent memory loss.       Historical Information   Past Medical History:   Diagnosis Date    Headache(784.0)     Middle school    Migraine     Otitis media     Varicella     Visual impairment     Wears contacts     Past Surgical History:   Procedure Laterality Date    WISDOM TOOTH EXTRACTION       Social History   Social History     Substance and Sexual Activity   Alcohol Use Yes    Comment: Once a month     Social History     Substance and Sexual Activity   Drug Use Never     Social History     Tobacco Use   Smoking Status Never   Smokeless Tobacco Never     Family History   Problem Relation Age of Onset    Hyperlipidemia Mother     Cancer Mother         Breast; leukemia    Hypertension Father     Heart attack Maternal Grandmother     Heart attack Maternal Grandfather     Lupus Family     Hypertension Maternal Aunt     Hyperlipidemia Maternal Aunt     Hypertension Maternal Uncle     Hyperlipidemia Maternal Aunt        Meds/Allergies       Current Outpatient  Medications:     busPIRone (BUSPAR) 10 mg tablet    Cholecalciferol (Vitamin D) 50 MCG (2000 UT) tablet    ergocalciferol (VITAMIN D2) 50,000 units    ibuprofen (MOTRIN) 800 mg tablet    Multiple Vitamins-Minerals (MULTI ADULT GUMMIES PO)    Probiotic Product (PROBIOTIC-10 PO)    SUMAtriptan (IMITREX) 50 mg tablet    Tranexamic Acid 650 MG TABS    triamcinolone (KENALOG) 0.1 % cream    valACYclovir (VALTREX) 1,000 mg tablet    No Known Allergies        Objective     not currently breastfeeding. There is no height or weight on file to calculate BMI.        PHYSICAL EXAM:      General Appearance:   Alert, cooperative, no distress   HEENT:   Normocephalic, atraumatic, anicteric.     Neck:  Supple, symmetrical, trachea midline   Lungs:   Clear to auscultation bilaterally; no rales, rhonchi or wheezing; respirations unlabored    Heart::   Regular rate and rhythm; no murmur, rub, or gallop.   Abdomen:   Soft, non-tender, non-distended; normal bowel sounds; no masses, no organomegaly    Genitalia:   Deferred    Rectal:   Deferred    Extremities:  No cyanosis, clubbing or edema    Pulses:  2+ and symmetric    Skin:  No jaundice, rashes, or lesions    Lymph nodes:  No palpable cervical lymphadenopathy        Lab Results:   No visits with results within 1 Day(s) from this visit.   Latest known visit with results is:   Appointment on 04/29/2024   Component Date Value    WBC 04/29/2024 9.37     RBC 04/29/2024 4.72     Hemoglobin 04/29/2024 13.9     Hematocrit 04/29/2024 41.5     MCV 04/29/2024 88     MCH 04/29/2024 29.4     MCHC 04/29/2024 33.5     RDW 04/29/2024 12.2     MPV 04/29/2024 10.4     Platelets 04/29/2024 304     nRBC 04/29/2024 0     Segmented % 04/29/2024 70     Immature Grans % 04/29/2024 0     Lymphocytes % 04/29/2024 24     Monocytes % 04/29/2024 5     Eosinophils Relative 04/29/2024 0     Basophils Relative 04/29/2024 1     Absolute Neutrophils 04/29/2024 6.59     Absolute Immature Grans 04/29/2024 0.03      Absolute Lymphocytes 04/29/2024 2.20     Absolute Monocytes 04/29/2024 0.47     Eosinophils Absolute 04/29/2024 0.03     Basophils Absolute 04/29/2024 0.05          Radiology Results:   No results found.

## 2024-05-09 NOTE — PATIENT INSTRUCTIONS
Make sure you are drinking at least 64 ounces of water/day   Start the omeprazole every morning: wait 30 minutes, eat something, then have your coffee  Stay upright for at least 2-3 hours after eating      GERD (Gastroesophageal Reflux Disease)   WHAT YOU NEED TO KNOW:   Gastroesophageal reflux disease (GERD) is reflux that happens more than 2 times a week for a few weeks. Reflux means acid and food in your stomach back up into your esophagus. GERD can cause other health problems over time if it is not treated.       DISCHARGE INSTRUCTIONS:   Call your local emergency number (911 in the [place]) if:   You have severe chest pain and sudden trouble breathing.      Return to the emergency department if:   You have trouble breathing after you vomit.    You have trouble swallowing, or pain with swallowing.    Your bowel movements are black, bloody, or tarry-looking.    Your vomit looks like coffee grounds or has blood in it.    Call your doctor or gastroenterologist if:   You feel full and cannot burp or vomit.    You vomit large amounts, or you vomit often.    You are losing weight without trying.    Your symptoms get worse or do not improve with treatment.    You have questions or concerns about your condition or care.    Medicines:   Medicines  are used to decrease stomach acid. Medicine may also be used to help your lower esophageal sphincter and stomach contract (tighten) more.    Take your medicine as directed.  Contact your healthcare provider if you think your medicine is not helping or if you have side effects. Tell your provider if you are allergic to any medicine. Keep a list of the medicines, vitamins, and herbs you take. Include the amounts, and when and why you take them. Bring the list or the pill bottles to follow-up visits. Carry your medicine list with you in case of an emergency.    Manage GERD:       Do not have foods or drinks that may increase heartburn.  These include chocolate, peppermint, fried or  fatty foods, drinks that contain caffeine, or carbonated drinks (soda). Other foods include spicy foods, onions, tomatoes, and tomato-based foods. Do not have foods or drinks that can irritate your esophagus, such as citrus fruits, juices, and alcohol.    Do not eat large meals.  When you eat a lot of food at one time, your stomach needs more acid to digest it. Eat 6 small meals each day instead of 3 large ones, and eat slowly. Do not eat meals 2 to 3 hours before bedtime.    Elevate the head of your bed.  Place 6-inch blocks under the head of your bed frame. You may also use more than one pillow under your head and shoulders while you sleep.    Maintain a healthy weight.  If you are overweight, weight loss may help relieve symptoms of GERD.    Do not smoke.  Smoking weakens the lower esophageal sphincter and increases the risk of GERD. Ask your healthcare provider for information if you currently smoke and need help to quit. E-cigarettes or smokeless tobacco still contain nicotine. Talk to your healthcare provider before you use these products.    Do not put pressure on your abdomen.  Pressure pushes acid up into your esophagus. Do not wear clothing that is tight around your waist. Do not bend over. Bend at the knees if you need to pick something up.  Follow up with your doctor or gastroenterologist as directed:  Write down your questions so you remember to ask them during your visits.  © Copyright [employer] 2023 Information is for End User's use only and may not be sold, redistributed or otherwise used for commercial purposes.  The above information is an  only. It is not intended as medical advice for individual conditions or treatments. Talk to your doctor, nurse or pharmacist before following any medical regimen to see if it is safe and effective for you.    Scheduled date of EGD(as of today): 05/29/24  Physician performing EGD: Dr Miles  Location of EGD: [facility]  Instructions reviewed with  patient by: Nery  Clearances:     Scheduled f/u with juan and gave lab orders

## 2024-05-15 ENCOUNTER — ANESTHESIA EVENT (OUTPATIENT)
Dept: ANESTHESIOLOGY | Facility: HOSPITAL | Age: 34
End: 2024-05-15

## 2024-05-15 ENCOUNTER — ANESTHESIA (OUTPATIENT)
Dept: ANESTHESIOLOGY | Facility: HOSPITAL | Age: 34
End: 2024-05-15

## 2024-05-17 DIAGNOSIS — F41.9 ANXIETY: ICD-10-CM

## 2024-05-17 RX ORDER — BUSPIRONE HYDROCHLORIDE 10 MG/1
10 TABLET ORAL 3 TIMES DAILY
Qty: 270 TABLET | Refills: 1 | Status: SHIPPED | OUTPATIENT
Start: 2024-05-17

## 2024-05-29 ENCOUNTER — HOSPITAL ENCOUNTER (OUTPATIENT)
Dept: GASTROENTEROLOGY | Facility: AMBULARY SURGERY CENTER | Age: 34
Setting detail: OUTPATIENT SURGERY
Discharge: HOME | End: 2024-05-29
Payer: COMMERCIAL

## 2024-05-29 ENCOUNTER — ANESTHESIA EVENT (OUTPATIENT)
Dept: GASTROENTEROLOGY | Facility: AMBULARY SURGERY CENTER | Age: 34
End: 2024-05-29

## 2024-05-29 ENCOUNTER — ANESTHESIA (OUTPATIENT)
Dept: GASTROENTEROLOGY | Facility: AMBULARY SURGERY CENTER | Age: 34
End: 2024-05-29

## 2024-05-29 VITALS
WEIGHT: 179 LBS | HEIGHT: 66 IN | BODY MASS INDEX: 28.77 KG/M2 | HEART RATE: 79 BPM | SYSTOLIC BLOOD PRESSURE: 123 MMHG | RESPIRATION RATE: 16 BRPM | DIASTOLIC BLOOD PRESSURE: 66 MMHG | TEMPERATURE: 97.3 F | OXYGEN SATURATION: 98 %

## 2024-05-29 DIAGNOSIS — R10.13 EPIGASTRIC PAIN: ICD-10-CM

## 2024-05-29 DIAGNOSIS — R10.84 GENERALIZED ABDOMINAL PAIN: ICD-10-CM

## 2024-05-29 LAB
EXT PREGNANCY TEST URINE: NEGATIVE
EXT. CONTROL: NORMAL

## 2024-05-29 PROCEDURE — 43239 EGD BIOPSY SINGLE/MULTIPLE: CPT | Performed by: INTERNAL MEDICINE

## 2024-05-29 PROCEDURE — 81025 URINE PREGNANCY TEST: CPT | Performed by: INTERNAL MEDICINE

## 2024-05-29 PROCEDURE — 88305 TISSUE EXAM BY PATHOLOGIST: CPT | Performed by: PATHOLOGY

## 2024-05-29 RX ORDER — PROPOFOL 10 MG/ML
INJECTION, EMULSION INTRAVENOUS AS NEEDED
Status: DISCONTINUED | OUTPATIENT
Start: 2024-05-29 | End: 2024-05-29

## 2024-05-29 RX ORDER — SODIUM CHLORIDE, SODIUM LACTATE, POTASSIUM CHLORIDE, CALCIUM CHLORIDE 600; 310; 30; 20 MG/100ML; MG/100ML; MG/100ML; MG/100ML
INJECTION, SOLUTION INTRAVENOUS CONTINUOUS PRN
Status: DISCONTINUED | OUTPATIENT
Start: 2024-05-29 | End: 2024-05-29

## 2024-05-29 RX ADMIN — SODIUM CHLORIDE, SODIUM LACTATE, POTASSIUM CHLORIDE, AND CALCIUM CHLORIDE: .6; .31; .03; .02 INJECTION, SOLUTION INTRAVENOUS at 08:42

## 2024-05-29 RX ADMIN — PROPOFOL 50 MG: 10 INJECTION, EMULSION INTRAVENOUS at 09:05

## 2024-05-29 RX ADMIN — PROPOFOL 150 MG: 10 INJECTION, EMULSION INTRAVENOUS at 09:02

## 2024-05-29 RX ADMIN — PROPOFOL 50 MG: 10 INJECTION, EMULSION INTRAVENOUS at 09:08

## 2024-05-29 NOTE — H&P
"History and Physical -  [facility]astroenterology Specialists  Shruthi Alcantar 34 y.o. female MRN: [id number]                  HPI: Shruthi Alcantar is a 34 y.o. year old female who presents for dyspepsia.      REVIEW OF SYSTEMS: Per the HPI, and otherwise unremarkable.    Historical Information   Past Medical History:   Diagnosis Date    Headache(784.0)     Middle school    Migraine     Otitis media     Varicella     Visual impairment     Wears contacts     Past Surgical History:   Procedure Laterality Date    WISDOM TOOTH EXTRACTION       Social History   Social History     Substance and Sexual Activity   Alcohol Use Yes    Comment: Once a month     Social History     Substance and Sexual Activity   Drug Use Never     Social History     Tobacco Use   Smoking Status Never   Smokeless Tobacco Never     Family History   Problem Relation Age of Onset    Hyperlipidemia Mother     Cancer Mother         Breast; leukemia    Hypertension Father     Heart attack Maternal Grandmother     Heart attack Maternal Grandfather     Cancer Paternal Grandmother     Hypertension Maternal Aunt     Hyperlipidemia Maternal Aunt     Hyperlipidemia Maternal Aunt     Hypertension Maternal Uncle     Lupus Family        Meds/Allergies       Current Outpatient Medications:     busPIRone (BUSPAR) 10 mg tablet    Cholecalciferol (Vitamin D) 50 MCG (2000 UT) tablet    ergocalciferol (VITAMIN D2) 50,000 units    famotidine (PEPCID) 20 mg/2.5 mL oral suspension    ibuprofen (MOTRIN) 800 mg tablet    Multiple Vitamins-Minerals (MULTI ADULT GUMMIES PO)    omeprazole (PriLOSEC) 20 mg delayed release capsule    Probiotic Product (PROBIOTIC-10 PO)    Tranexamic Acid 650 MG TABS    SUMAtriptan (IMITREX) 50 mg tablet    triamcinolone (KENALOG) 0.1 % cream    valACYclovir (VALTREX) 1,000 mg tablet    No Known Allergies    Objective     /75   Pulse 95   Temp (!) 97.2 °F (36.2 °C) (Temporal)   Resp 22   Ht 5' 6\" (1.676 m)   Wt 81.2 kg (179 lb)   " LMP 04/22/2024 (Approximate)   SpO2 97%   BMI 28.89 kg/m²       PHYSICAL EXAM    Gen: NAD  Head: NCAT  CV: RRR  CHEST: Clear  ABD: soft, NT/ND  EXT: no edema      ASSESSMENT/PLAN:  This is a 34 y.o. year old female here for EGD, and she is stable and optimized for her procedure.

## 2024-05-29 NOTE — ANESTHESIA PREPROCEDURE EVALUATION
Procedure:  EGD  Erna is a 33 y/o female who presents for consultation of GERD.      Upper abdominal pain  Nausea   NSAID use  Chronic constipation   Pt says that since December, she has had intermittent episodes of epigastric pain + nausea but this has recently worsened and is occurring more frequently. She has interchanged 2 weeks of pepcid and 2 weeks of PPI since then with some relief. She says she is usually chronically constipated but over the last week or so, she has actually been having soft-formed Bms daily.  Abdominal u/s was completed but results pending. CBC WNL. Pt says she was taking NSAIDs at least 3 times/week prior to this starting, but has stopped since.   -please get thyroid lab work done that was ordered by Dr. Jones   -celiac serologies and CMP ordered  -await abdominal u/s  -EGD ordered to rule out H.pylori, ulcer disease, celiac, hiatal hernia   -start omeprazole 20 mg daily  -anti-gerd diet discussed and handout printed  - increase daily water intake  -explained that if she becomes constipated again, I would want her to let us know as this could worsen her symptoms  -continue NSAID cessation   ______________________________________________________________________     HPI:  Erna is a 33 y/o female who presents for consultation of GERD. Pt says that since December, she has had intermittent episodes of epigastric pain + nausea but this has recently worsened and is occurring more frequently. She has interchanged 2 weeks of pepcid and 2 weeks of PPI since then with some relief. She says she is usually chronically constipated but over the last week or so, she has actually been having soft-formed Bms daily. Pt says she was taking NSAIDs at least 3 times/week prior to this starting, but has stopped since. She denies immediate family hx of colon cancer, unintentional weight loss, fevers, chills, night sweats, trouble swallowing, heartburn, bloody or black BM. Pt denies tobacco and alcohol and  illicit drug use. Pt denies prior abdominal surgical hx. Pt is not on blood thinners.   Relevant Problems   NEURO/PSYCH   (+) Anxiety     Anxiety   Bell's palsy   Generalized abdominal pain   Hashimoto's thyroiditis   Low serum alkaline phosphatase   Palpitations   Thyroid nodule   Viral infection, unspecified   Vitamin D deficiency        Physical Exam    Airway    Mallampati score: II  TM Distance: >3 FB  Neck ROM: full     Dental       Cardiovascular  Cardiovascular exam normal    Pulmonary  Pulmonary exam normal     Other Findings  post-pubertal.      Anesthesia Plan  ASA Score- 2     Anesthesia Type- IV sedation with anesthesia with ASA Monitors.         Additional Monitors:     Airway Plan:            Plan Factors-Exercise tolerance (METS): >4 METS.    Chart reviewed. EKG reviewed.  Existing labs reviewed. Patient summary reviewed.    Patient is not a current smoker.              Induction- intravenous.    Postoperative Plan-         Informed Consent- Anesthetic plan and risks discussed with patient.  I personally reviewed this patient with the CRNA. Discussed and agreed on the Anesthesia Plan with the CRNA..

## 2024-05-29 NOTE — ANESTHESIA POSTPROCEDURE EVALUATION
Post-Op Assessment Note    CV Status:  Stable    Pain management: adequate       Mental Status:  Alert and awake   Hydration Status:  Euvolemic   PONV Controlled:  Controlled   Airway Patency:  Patent     Post Op Vitals Reviewed: Yes    No anethesia notable event occurred.    Staff: Anesthesiologist, CRNA               BP   123/78   Temp 98   Pulse 79   Resp 12   SpO2 100

## 2024-05-31 DIAGNOSIS — K21.9 GASTROESOPHAGEAL REFLUX DISEASE, UNSPECIFIED WHETHER ESOPHAGITIS PRESENT: ICD-10-CM

## 2024-05-31 DIAGNOSIS — R10.13 EPIGASTRIC PAIN: ICD-10-CM

## 2024-05-31 PROCEDURE — 88305 TISSUE EXAM BY PATHOLOGIST: CPT | Performed by: PATHOLOGY

## 2024-05-31 RX ORDER — OMEPRAZOLE 20 MG/1
20 CAPSULE, DELAYED RELEASE ORAL DAILY
Qty: 90 CAPSULE | Refills: 1 | Status: SHIPPED | OUTPATIENT
Start: 2024-05-31

## 2024-06-06 ENCOUNTER — APPOINTMENT (OUTPATIENT)
Dept: LAB | Facility: CLINIC | Age: 34
End: 2024-06-06
Payer: COMMERCIAL

## 2024-06-06 DIAGNOSIS — R74.8 LOW SERUM ALKALINE PHOSPHATASE: ICD-10-CM

## 2024-06-06 DIAGNOSIS — E06.3 HASHIMOTO'S THYROIDITIS: ICD-10-CM

## 2024-06-06 DIAGNOSIS — E55.9 VITAMIN D DEFICIENCY: ICD-10-CM

## 2024-06-06 DIAGNOSIS — Z00.8 HEALTH EXAMINATION IN POPULATION SURVEY: ICD-10-CM

## 2024-06-06 DIAGNOSIS — R10.84 GENERALIZED ABDOMINAL PAIN: ICD-10-CM

## 2024-06-06 LAB
25(OH)D3 SERPL-MCNC: 33 NG/ML (ref 30–100)
ALBUMIN SERPL BCP-MCNC: 4.3 G/DL (ref 3.5–5)
ALP SERPL-CCNC: 38 U/L (ref 34–104)
ALT SERPL W P-5'-P-CCNC: 11 U/L (ref 7–52)
ANION GAP SERPL CALCULATED.3IONS-SCNC: 11 MMOL/L (ref 4–13)
AST SERPL W P-5'-P-CCNC: 12 U/L (ref 13–39)
BILIRUB SERPL-MCNC: 0.58 MG/DL (ref 0.2–1)
BUN SERPL-MCNC: 8 MG/DL (ref 5–25)
CALCIUM SERPL-MCNC: 9.4 MG/DL (ref 8.4–10.2)
CHLORIDE SERPL-SCNC: 103 MMOL/L (ref 96–108)
CHOLEST SERPL-MCNC: 197 MG/DL
CO2 SERPL-SCNC: 23 MMOL/L (ref 21–32)
CREAT SERPL-MCNC: 0.65 MG/DL (ref 0.6–1.3)
EST. AVERAGE GLUCOSE BLD GHB EST-MCNC: 85 MG/DL
GFR SERPL CREATININE-BSD FRML MDRD: 115 ML/MIN/1.73SQ M
GLIADIN PEPTIDE IGA SER-ACNC: <0.2 U/ML
GLIADIN PEPTIDE IGA SER-ACNC: NEGATIVE
GLIADIN PEPTIDE IGG SER-ACNC: <0.4 U/ML
GLIADIN PEPTIDE IGG SER-ACNC: NEGATIVE
GLUCOSE P FAST SERPL-MCNC: 84 MG/DL (ref 65–99)
HBA1C MFR BLD: 4.6 %
HDLC SERPL-MCNC: 60 MG/DL
IGA SERPL-MCNC: 191 MG/DL (ref 66–433)
LDLC SERPL CALC-MCNC: 121 MG/DL (ref 0–100)
NONHDLC SERPL-MCNC: 137 MG/DL
PHOSPHATE SERPL-MCNC: 3 MG/DL (ref 2.7–4.5)
POTASSIUM SERPL-SCNC: 3.7 MMOL/L (ref 3.5–5.3)
PROT SERPL-MCNC: 7.3 G/DL (ref 6.4–8.4)
PTH-INTACT SERPL-MCNC: 18.7 PG/ML (ref 12–88)
SODIUM SERPL-SCNC: 137 MMOL/L (ref 135–147)
T4 FREE SERPL-MCNC: 0.93 NG/DL (ref 0.61–1.12)
TRIGL SERPL-MCNC: 80 MG/DL
TSH SERPL DL<=0.05 MIU/L-ACNC: 2.4 UIU/ML (ref 0.45–4.5)
TTG IGA SER-ACNC: <0.5 U/ML
TTG IGA SER-ACNC: NEGATIVE
TTG IGG SER-ACNC: <0.8 U/ML
TTG IGG SER-ACNC: NEGATIVE

## 2024-06-06 PROCEDURE — 83970 ASSAY OF PARATHORMONE: CPT

## 2024-06-06 PROCEDURE — 83036 HEMOGLOBIN GLYCOSYLATED A1C: CPT

## 2024-06-06 PROCEDURE — 84080 ASSAY ALKALINE PHOSPHATASES: CPT

## 2024-06-06 PROCEDURE — 86364 TISS TRNSGLTMNASE EA IG CLAS: CPT

## 2024-06-06 PROCEDURE — 86258 DGP ANTIBODY EACH IG CLASS: CPT

## 2024-06-06 PROCEDURE — 84443 ASSAY THYROID STIM HORMONE: CPT

## 2024-06-06 PROCEDURE — 80061 LIPID PANEL: CPT

## 2024-06-06 PROCEDURE — 80053 COMPREHEN METABOLIC PANEL: CPT

## 2024-06-06 PROCEDURE — 82784 ASSAY IGA/IGD/IGG/IGM EACH: CPT

## 2024-06-06 PROCEDURE — 82306 VITAMIN D 25 HYDROXY: CPT

## 2024-06-06 PROCEDURE — 36415 COLL VENOUS BLD VENIPUNCTURE: CPT

## 2024-06-06 PROCEDURE — 84100 ASSAY OF PHOSPHORUS: CPT

## 2024-06-06 PROCEDURE — 84439 ASSAY OF FREE THYROXINE: CPT

## 2024-06-12 LAB — ALP BONE SERPL-MCNC: 9.5 UG/L

## 2024-07-05 DIAGNOSIS — N94.6 MENSTRUAL PAIN: ICD-10-CM

## 2024-07-05 DIAGNOSIS — R10.30 LOWER ABDOMINAL PAIN: Primary | ICD-10-CM

## 2024-07-08 ENCOUNTER — OFFICE VISIT (OUTPATIENT)
Dept: OBGYN CLINIC | Facility: CLINIC | Age: 34
End: 2024-07-08
Payer: COMMERCIAL

## 2024-07-08 VITALS
SYSTOLIC BLOOD PRESSURE: 122 MMHG | BODY MASS INDEX: 28.61 KG/M2 | WEIGHT: 178 LBS | HEIGHT: 66 IN | DIASTOLIC BLOOD PRESSURE: 76 MMHG

## 2024-07-08 DIAGNOSIS — R10.2 PELVIC PAIN: Primary | ICD-10-CM

## 2024-07-08 PROCEDURE — 99214 OFFICE O/P EST MOD 30 MIN: CPT | Performed by: OBSTETRICS & GYNECOLOGY

## 2024-07-08 NOTE — PROGRESS NOTES
Ambulatory Visit  Name: Shruthi Alcantar      : 1990      MRN: [id number]  Encounter Provider: Lory Estrella DO  Encounter Date: 2024   Encounter department: [facility]    Assessment & Plan   1. Pelvic pain        She will keep her appointment as scheduled in radiology.  Return to office in 1 week thereafter.  Briefly discussed ovarian cyst and etiology.  All questions answered at this time.  Reviewed all precautions.      History of Present Illness     Shruthi Alcantar is a 34 y.o. female who presents     This is a pleasant 34-year-old female G0 accompanied with her sister complains of right lower quadrant pain, described as dull aching intermittent nonradiating over the last 2-1/2 months.  She was referred to gastroenterology underwent endoscopy with essentially negative workup.  She does however have a history of IBS for many years.    Her cycles are regular every 28 days lasting 5 to 6 days with no breakthrough bleeding.  She has never been sexually active.  She did have some menstrual cramping with her last cycle and will use Motrin with improvement.    There is been no nausea or vomiting no changes in weight, appetite unchanged.  She did see her family doctor 4 days prior and an order for abdominal pelvic ultrasound is ordered and scheduled for next week.    Review of Systems   Constitutional:  Negative for fatigue, fever and unexpected weight change.   Respiratory:  Negative for cough, chest tightness, shortness of breath and wheezing.    Cardiovascular: Negative.  Negative for chest pain and palpitations.   Gastrointestinal:  Positive for abdominal pain. Negative for abdominal distention, blood in stool, constipation, diarrhea, nausea and vomiting.   Genitourinary:  Positive for pelvic pain. Negative for difficulty urinating, dyspareunia, dysuria, flank pain, frequency, genital sores, hematuria, urgency, vaginal bleeding, vaginal discharge and vaginal pain.   Skin:  Negative  "for rash.     Current Outpatient Medications on File Prior to Visit   Medication Sig Dispense Refill    busPIRone (BUSPAR) 10 mg tablet TAKE 1 TABLET BY MOUTH THREE TIMES A  tablet 1    Cholecalciferol (Vitamin D) 50 MCG (2000 UT) tablet Take 2,000 Units by mouth daily      ibuprofen (MOTRIN) 800 mg tablet Take 1 tablet (800 mg total) by mouth every 8 (eight) hours as needed for mild pain or moderate pain 30 tablet 2    Multiple Vitamins-Minerals (MULTI ADULT GUMMIES PO) Take by mouth daily      omeprazole (PriLOSEC) 20 mg delayed release capsule TAKE 1 CAPSULE (20 MG TOTAL) BY MOUTH DAILY 30 MINUTES BEFORE BREAKFAST 90 capsule 1    SUMAtriptan (IMITREX) 50 mg tablet Take 1 tablet (50 mg total) by mouth once as needed for migraine for up to 1 dose 9 tablet 0    Tranexamic Acid 650 MG TABS Take 1 tablet (650 mg total) by mouth 3 (three) times a day 30 tablet 7    ergocalciferol (VITAMIN D2) 50,000 units TAKE 1 CAPSULE BY MOUTH ONE TIME PER WEEK 12 capsule 0    famotidine (PEPCID) 20 mg/2.5 mL oral suspension Take 20 mg by mouth 2 (two) times a day      Probiotic Product (PROBIOTIC-10 PO) Take 1 capsule by mouth in the morning      triamcinolone (KENALOG) 0.1 % cream Apply topically 2 (two) times a day (Patient not taking: Reported on 11/8/2023) 15 g 1    valACYclovir (VALTREX) 1,000 mg tablet Take 1 tablet (1,000 mg total) by mouth 2 (two) times a day for 1 day (Patient not taking: Reported on 11/8/2023) 30 tablet 0     No current facility-administered medications on file prior to visit.      Objective     /76   Ht 5' 6\" (1.676 m)   Wt 80.7 kg (178 lb)   LMP 07/02/2024 (Exact Date)   BMI 28.73 kg/m²     Physical Exam  Pulmonary:      Effort: Pulmonary effort is normal.   Abdominal:      General: Bowel sounds are normal. There is no distension.      Palpations: Abdomen is soft.      Tenderness: There is no abdominal tenderness. There is no guarding or rebound.      Hernia: No hernia is present. "   Genitourinary:     Labia:         Right: No rash, tenderness or lesion.         Left: No rash, tenderness or lesion.       Vagina: No signs of injury. No vaginal discharge, tenderness or lesions.      Cervix: No cervical motion tenderness, discharge, friability, lesion, erythema or cervical bleeding.      Uterus: Not enlarged and not tender.       Adnexa:         Right: Mass present. No tenderness or fullness.          Left: No mass, tenderness or fullness.     Neurological:      Mental Status: She is oriented to person, place, and time.       Administrative Statements   I have spent a total time of 30 minutes in caring for this patient on the day of the visit/encounter including Instructions for management, Impressions, Counseling / Coordination of care, Documenting in the medical record, Reviewing / ordering tests, medicine, procedures  , and Obtaining or reviewing history  .

## 2024-07-17 ENCOUNTER — HOSPITAL ENCOUNTER (OUTPATIENT)
Dept: RADIOLOGY | Facility: HOSPITAL | Age: 34
Discharge: HOME | End: 2024-07-17
Payer: COMMERCIAL

## 2024-07-17 DIAGNOSIS — N94.6 MENSTRUAL PAIN: ICD-10-CM

## 2024-07-17 DIAGNOSIS — R10.30 LOWER ABDOMINAL PAIN: ICD-10-CM

## 2024-07-17 PROCEDURE — 76856 US EXAM PELVIC COMPLETE: CPT

## 2024-07-30 ENCOUNTER — OFFICE VISIT (OUTPATIENT)
Dept: OBGYN CLINIC | Facility: CLINIC | Age: 34
End: 2024-07-30
Payer: COMMERCIAL

## 2024-07-30 VITALS
SYSTOLIC BLOOD PRESSURE: 118 MMHG | HEIGHT: 66 IN | DIASTOLIC BLOOD PRESSURE: 72 MMHG | BODY MASS INDEX: 28.87 KG/M2 | WEIGHT: 179.6 LBS

## 2024-07-30 DIAGNOSIS — D21.9 FIBROIDS: Primary | ICD-10-CM

## 2024-07-30 PROCEDURE — 99214 OFFICE O/P EST MOD 30 MIN: CPT | Performed by: OBSTETRICS & GYNECOLOGY

## 2024-07-30 NOTE — PROGRESS NOTES
Ambulatory Visit  Name: Shruthi Alcantar      : 1990      MRN: [id number]  Encounter Provider: Lory Estrella DO  Encounter Date: 2024   Encounter department: [facility]    Assessment & Plan   1. Fibroids  -     Ambulatory Referral to Infertility; Future  -     US pelvis transabdominal only; Future; Expected date: 2025      Reviewed pelvic ultrasound revealing intramural multifibroid fibroids (x 4).  Implications reviewed.  Discussed treatment options for fibroids including medical versus surgical intervention.  If she opts to remain conservative, recommend repeat pelvic ultrasound in 6 months follow-up fibroid growth.  Regarding future pregnancies, AMA: Discussed consultation with DAIANA regarding management options.  She was considering harvesting eggs.  All questions answered at this time.  She will call with update after consultation.      History of Present Illness     Shruthi Alcantar is a 34 y.o. female who presents     This is a pleasant 34-year-old female G0 presents for follow-up.  She had an episode of right lower quadrant pain described as dull and aching intermittent over 6 to 8 weeks.  Pain has resolved.  Her cycles are regular every 28 days lasting 5 to 6 days with no breakthrough bleeding.  She does get menstrual cramping more so with her last cycle compared to her present cycle.  She does have a history of IBS.  Workup included endoscopy with GI, primary care.    Patient has never been sexually active.  She is potentially interested in future pregnancies.    Review of Systems   Constitutional:  Negative for fatigue, fever and unexpected weight change.   Respiratory:  Negative for cough, chest tightness, shortness of breath and wheezing.    Cardiovascular: Negative.  Negative for chest pain and palpitations.   Gastrointestinal: Negative.  Negative for abdominal distention, abdominal pain, blood in stool, constipation, diarrhea, nausea and vomiting.   Genitourinary:  "Negative.  Negative for difficulty urinating, dyspareunia, dysuria, flank pain, frequency, genital sores, hematuria, pelvic pain, urgency, vaginal bleeding, vaginal discharge and vaginal pain.   Skin:  Negative for rash.     Current Outpatient Medications on File Prior to Visit   Medication Sig Dispense Refill    busPIRone (BUSPAR) 10 mg tablet TAKE 1 TABLET BY MOUTH THREE TIMES A  tablet 1    Cholecalciferol (Vitamin D) 50 MCG (2000 UT) tablet Take 2,000 Units by mouth daily      ibuprofen (MOTRIN) 800 mg tablet Take 1 tablet (800 mg total) by mouth every 8 (eight) hours as needed for mild pain or moderate pain 30 tablet 2    Multiple Vitamins-Minerals (MULTI ADULT GUMMIES PO) Take by mouth daily      omeprazole (PriLOSEC) 20 mg delayed release capsule TAKE 1 CAPSULE (20 MG TOTAL) BY MOUTH DAILY 30 MINUTES BEFORE BREAKFAST 90 capsule 1    Tranexamic Acid 650 MG TABS Take 1 tablet (650 mg total) by mouth 3 (three) times a day 30 tablet 7    ergocalciferol (VITAMIN D2) 50,000 units TAKE 1 CAPSULE BY MOUTH ONE TIME PER WEEK 12 capsule 0    famotidine (PEPCID) 20 mg/2.5 mL oral suspension Take 20 mg by mouth 2 (two) times a day      Probiotic Product (PROBIOTIC-10 PO) Take 1 capsule by mouth in the morning      SUMAtriptan (IMITREX) 50 mg tablet Take 1 tablet (50 mg total) by mouth once as needed for migraine for up to 1 dose (Patient not taking: Reported on 7/30/2024) 9 tablet 0    triamcinolone (KENALOG) 0.1 % cream Apply topically 2 (two) times a day (Patient not taking: Reported on 11/8/2023) 15 g 1    valACYclovir (VALTREX) 1,000 mg tablet Take 1 tablet (1,000 mg total) by mouth 2 (two) times a day for 1 day (Patient not taking: Reported on 11/8/2023) 30 tablet 0     No current facility-administered medications on file prior to visit.      Objective     /72   Ht 5' 6\" (1.676 m)   Wt 81.5 kg (179 lb 9.6 oz)   LMP 07/29/2024 (Exact Date)   BMI 28.99 kg/m²     Physical Exam  Administrative Statements "   I have spent a total time of 30 minutes in caring for this patient on the day of the visit/encounter including Diagnostic results, Prognosis, and Risks and benefits of tx options.

## 2024-07-31 ENCOUNTER — OFFICE VISIT (OUTPATIENT)
Dept: FAMILY MEDICINE CLINIC | Facility: CLINIC | Age: 34
End: 2024-07-31
Payer: COMMERCIAL

## 2024-07-31 VITALS
HEART RATE: 103 BPM | BODY MASS INDEX: 28.57 KG/M2 | TEMPERATURE: 98.4 F | DIASTOLIC BLOOD PRESSURE: 70 MMHG | SYSTOLIC BLOOD PRESSURE: 118 MMHG | WEIGHT: 177 LBS | OXYGEN SATURATION: 96 %

## 2024-07-31 DIAGNOSIS — Z00.00 ANNUAL PHYSICAL EXAM: Primary | ICD-10-CM

## 2024-07-31 DIAGNOSIS — E55.9 VITAMIN D DEFICIENCY: ICD-10-CM

## 2024-07-31 DIAGNOSIS — F41.9 ANXIETY: ICD-10-CM

## 2024-07-31 PROCEDURE — 99395 PREV VISIT EST AGE 18-39: CPT | Performed by: NURSE PRACTITIONER

## 2024-07-31 RX ORDER — BUSPIRONE HYDROCHLORIDE 15 MG/1
15 TABLET ORAL 2 TIMES DAILY
Qty: 180 TABLET | Refills: 0 | Status: SHIPPED | OUTPATIENT
Start: 2024-07-31

## 2024-07-31 NOTE — ASSESSMENT & PLAN NOTE
Reports anxiety well controlled on current treatment  No depression  But she does have some concern for missing doses once she starts her new job  (Recently graduated/passed boards for [employer])  Alternate medication options dicussed  As she has been doing so well on Buspar, will continue same med  Will trial increasing slight and going back to twice daily  (Continue same 30 mg total per day)  She will send me a message in the next few weeks with symptoms update  Return to care with any concerns    ER precautions with any acute mental health changes

## 2024-07-31 NOTE — PROGRESS NOTES
Adult Annual Physical  Name: Shruthi LOGAN [facility]      : 1990      MRN: [id number]  Encounter Provider: CHRIS Teague  Encounter Date: 2024   Encounter department: [facility]    Assessment & Plan   1. Annual physical exam  2. Anxiety  Assessment & Plan:  Reports anxiety well controlled on current treatment  No depression  But she does have some concern for missing doses once she starts her new job  (Recently graduated/passed boards for [employer])  Alternate medication options dicussed  As she has been doing so well on Buspar, will continue same med  Will trial increasing slight and going back to twice daily  (Continue same 30 mg total per day)  She will send me a message in the next few weeks with symptoms update  Return to care with any concerns    ER precautions with any acute mental health changes  Orders:  -     busPIRone (BUSPAR) 15 mg tablet; Take 1 tablet (15 mg total) by mouth 2 (two) times a day  3. Vitamin D deficiency  Assessment & Plan:  Taking 2000 IU daily  Recheck Level 3 month  Orders:  -     Vitamin D 25 hydroxy; Future; Expected date: 10/31/2024    Immunizations and preventive care screenings were discussed with patient today. Appropriate education was printed on patient's after visit summary.    Counseling:  Alcohol/drug use: discussed moderation in alcohol intake, the recommendations for healthy alcohol use, and avoidance of illicit drug use.  Dental Health: discussed importance of regular tooth brushing, flossing, and dental visits.  Injury prevention: discussed safety/seat belts, safety helmets, smoke detectors, carbon dioxide detectors, and smoking near bedding or upholstery.  Sexual health: discussed sexually transmitted diseases, partner selection, use of condoms, avoidance of unintended pregnancy, and contraceptive alternatives.  Exercise: the importance of regular exercise/physical activity was discussed. Recommend exercise 3-5 times per week for at  least 30 minutes.       Depression Screening and Follow-up Plan: Patient was screened for depression during today's encounter. They screened negative with a PHQ-2 score of 0.        History of Present Illness     Adult Annual Physical:  Patient presents for annual physical. Preventative care  Age appropriate screenings/preventative care recommendations reviewed  Following with Gi, Gyn, Endo  .     Diet and Physical Activity:  - Diet/Nutrition: well balanced diet.  - Exercise: walking and moderate cardiovascular exercise.    Depression Screening:  - PHQ-2 Score: 0    General Health:  - Sleep: sleeps well.  - Hearing: normal hearing bilateral ears.  - Vision: no vision problems, goes for regular eye exams and wears contacts.  - Dental: regular dental visits.    /GYN Health:  - Follows with GYN: yes.   - Menopause: premenopausal.   - History of STDs: no  - Contraception:. Fibroids      Advanced Care Planning:  - Has an advanced directive?: no    - Has a durable medical POA?: no    - ACP document given to patient?: no      Review of Systems   Constitutional: Negative.    HENT: Negative.     Eyes: Negative.    Respiratory: Negative.     Cardiovascular: Negative.    Gastrointestinal: Negative.    Genitourinary: Negative.    Musculoskeletal: Negative.    Skin: Negative.    Neurological: Negative.    Psychiatric/Behavioral: Negative.           Objective     /70 (BP Location: Left arm, Patient Position: Sitting, Cuff Size: Large)   Pulse 103   Temp 98.4 °F (36.9 °C) (Temporal)   Wt 80.3 kg (177 lb)   LMP 07/29/2024 (Exact Date)   SpO2 96%   BMI 28.57 kg/m²     Physical Exam  Vitals and nursing note reviewed.   Constitutional:       General: She is not in acute distress.     Appearance: Normal appearance. She is well-developed and well-groomed. She is not ill-appearing.   HENT:      Head: Normocephalic.      Right Ear: Tympanic membrane, ear canal and external ear normal.      Left Ear: Tympanic membrane, ear  canal and external ear normal.      Nose: Nose normal.      Mouth/Throat:      Mouth: Mucous membranes are moist.      Pharynx: Oropharynx is clear.   Eyes:      Conjunctiva/sclera: Conjunctivae normal.      Pupils: Pupils are equal, round, and reactive to light.   Neck:      Thyroid: No thyromegaly.      Vascular: No carotid bruit.   Cardiovascular:      Rate and Rhythm: Normal rate and regular rhythm.      Pulses:           Posterior tibial pulses are 2+ on the right side and 2+ on the left side.      Heart sounds: Normal heart sounds.   Pulmonary:      Effort: Pulmonary effort is normal. No respiratory distress.      Breath sounds: Normal breath sounds and air entry.   Musculoskeletal:      Right lower leg: No edema.      Left lower leg: No edema.   Lymphadenopathy:      Cervical: No cervical adenopathy.   Skin:     General: Skin is warm and dry.   Neurological:      General: No focal deficit present.      Mental Status: She is alert and oriented to person, place, and time.   Psychiatric:         Attention and Perception: Attention normal.         Mood and Affect: Mood normal.         Behavior: Behavior normal.         Thought Content: Thought content normal.         Judgment: Judgment normal.

## 2024-08-07 ENCOUNTER — TELEPHONE (OUTPATIENT)
Dept: GASTROENTEROLOGY | Facility: CLINIC | Age: 34
End: 2024-08-07

## 2024-08-27 DIAGNOSIS — F41.9 ANXIETY: ICD-10-CM

## 2024-08-27 RX ORDER — BUSPIRONE HYDROCHLORIDE 10 MG/1
10 TABLET ORAL 3 TIMES DAILY
Qty: 270 TABLET | Refills: 1 | Status: SHIPPED | OUTPATIENT
Start: 2024-08-27 | End: 2024-08-30 | Stop reason: SDUPTHER

## 2024-08-30 DIAGNOSIS — F41.9 ANXIETY: ICD-10-CM

## 2024-08-30 RX ORDER — BUSPIRONE HYDROCHLORIDE 10 MG/1
10 TABLET ORAL 3 TIMES DAILY
Qty: 270 TABLET | Refills: 1 | Status: SHIPPED | OUTPATIENT
Start: 2024-08-30

## 2024-08-30 NOTE — TELEPHONE ENCOUNTER
Pt would like script sent to [facility] pharmacy instead of [facility]. Thank you.

## 2024-09-10 ENCOUNTER — OFFICE VISIT (OUTPATIENT)
Dept: OBGYN CLINIC | Facility: CLINIC | Age: 34
End: 2024-09-10
Payer: COMMERCIAL

## 2024-09-10 ENCOUNTER — APPOINTMENT (OUTPATIENT)
Dept: RADIOLOGY | Age: 34
End: 2024-09-10
Payer: COMMERCIAL

## 2024-09-10 VITALS
HEART RATE: 90 BPM | WEIGHT: 177 LBS | BODY MASS INDEX: 28.45 KG/M2 | SYSTOLIC BLOOD PRESSURE: 117 MMHG | DIASTOLIC BLOOD PRESSURE: 82 MMHG | HEIGHT: 66 IN

## 2024-09-10 DIAGNOSIS — M25.552 PAIN OF LEFT HIP: Primary | ICD-10-CM

## 2024-09-10 DIAGNOSIS — M25.552 PAIN OF LEFT HIP: ICD-10-CM

## 2024-09-10 DIAGNOSIS — M70.62 GREATER TROCHANTERIC BURSITIS OF LEFT HIP: ICD-10-CM

## 2024-09-10 PROCEDURE — 73502 X-RAY EXAM HIP UNI 2-3 VIEWS: CPT

## 2024-09-10 PROCEDURE — 99204 OFFICE O/P NEW MOD 45 MIN: CPT | Performed by: STUDENT IN AN ORGANIZED HEALTH CARE EDUCATION/TRAINING PROGRAM

## 2024-09-10 RX ORDER — MELOXICAM 15 MG/1
15 TABLET ORAL DAILY
Qty: 30 TABLET | Refills: 0 | Status: SHIPPED | OUTPATIENT
Start: 2024-09-10

## 2024-09-10 NOTE — PROGRESS NOTES
Date: 09/10/24  Shruthi LOGAN [facility]   MRN# [id number]  : 1990      Chief Complaint: left hip pain    Assessment and Plan:    Greater trochanteric bursitis of left hip  Patient has a history, physical examination and radiographic evaluation consistent with greater trochanteric bursitis.    -WBAT  -Patient informed that this is a self-limiting condition which will improve with appropriate rest, medications and rehabilitation  -Activity modification to limit strain or impact on the joint  -Meloxicam 15mg daily as needed  -Tylenol 1000mg up to three times daily as needed. Do not exceed 3000mg daily  -Supervised physical therapy. Script provided   -Home exercise program directed by PT  -Corticosteroid injection was offered but deferred at this time.  Patient will return for steroid injection if the above treatment modalities do not provide durable relief  -Patient may follow up prn for further evaluation and treatment          Subjective:     Hip Pain  Patient complains of left hip pain. This is evaluated as a personal injury. The pain began several weeks ago. The pain is located lateral and is made worse with walking and standing.  She describes the symptoms as pulsating and throbbing. Symptoms improve with rest, ice. The symptoms are worse with activity, stair climbing, weight bearing. The hip has not given out or felt unstable.  The patient is active in none. Treatment to date has been ice, NSAID's, without significant relief.    External Records Reviewed: none    Allergy:  No Known Allergies  Medications:  all current active meds have been reviewed  Past Medical History:  Past Medical History:   Diagnosis Date    Anxiety 10/2023    Headache(784.0)     Middle school    Migraine     Otitis media     Varicella     Visual impairment     Wears contacts     Past Surgical History:  Past Surgical History:   Procedure Laterality Date    WISDOM TOOTH EXTRACTION       Family History:  Family History   Problem  "Relation Age of Onset    Hyperlipidemia Mother     Cancer Mother         Breast; leukemia    Hypertension Father     Heart attack Maternal Grandmother     Heart attack Maternal Grandfather     Cancer Paternal Grandmother     Hypertension Maternal Aunt     Hyperlipidemia Maternal Aunt     Hyperlipidemia Maternal Aunt     Hypertension Maternal Uncle     Lupus Family      Social History:  Social History     Substance and Sexual Activity   Alcohol Use Yes    Comment: Once a month     Social History     Substance and Sexual Activity   Drug Use Never     Social History     Tobacco Use   Smoking Status Never   Smokeless Tobacco Never           ROS:   Review of Systems   All other systems reviewed and are negative.       Objective:   BP Readings from Last 1 Encounters:   09/10/24 117/82      Wt Readings from Last 1 Encounters:   09/10/24 80.3 kg (177 lb)      Pulse Readings from Last 1 Encounters:   09/10/24 90      BMI: Estimated body mass index is 28.57 kg/m² as calculated from the following:    Height as of this encounter: 5' 6\" (1.676 m).    Weight as of this encounter: 80.3 kg (177 lb).      Physical Exam  Constitutional:       General: She is not in acute distress.  HENT:      Head: Normocephalic and atraumatic.   Eyes:      Conjunctiva/sclera: Conjunctivae normal.   Cardiovascular:      Comments: Extremities well perfused   No LE edema    Pulmonary:      Effort: Pulmonary effort is normal.   Abdominal:      General: Abdomen is flat. Bowel sounds are normal.   Neurological:      Mental Status: She is alert. Mental status is at baseline.          Gait and Station:   normal    Left Hip     Inspection: normal color, temperature, turgor and moisture    Range of Motion: 0-110 without pain  Tenderness to palpation over the trochanteric flare    negative  log roll   negative Trendelenburg sign  negative  Stinchfield  negative  ANASTASIA  negative  FADDIR    Motor: 5/5 Q/HS/TA/GS/EHL/FHL    Vascular: Toes WWP with BCR    SILT " DP/SP/Ana/Saph/Tib        Images:    I personally reviewed relevant images in the PACS system and my interpretation is as follows:  X-rays of the left Hip reveal no osseus deformities or acute fractures.      Scribe Attestation      I,:  Luis Manuel Atkinson am acting as a scribe while in the presence of the attending physician.:       I,:  Saturnino Benjamin MD personally performed the services described in this documentation    as scribed in my presence.:                  Saturnino Benjamin MD  Adult Reconstruction Specialist   [facility]

## 2024-09-11 NOTE — ASSESSMENT & PLAN NOTE
Patient has a history, physical examination and radiographic evaluation consistent with greater trochanteric bursitis.    -WBAT  -Patient informed that this is a self-limiting condition which will improve with appropriate rest, medications and rehabilitation  -Activity modification to limit strain or impact on the joint  -Meloxicam 15mg daily as needed  -Tylenol 1000mg up to three times daily as needed. Do not exceed 3000mg daily  -Supervised physical therapy. Script provided   -Home exercise program directed by PT  -Corticosteroid injection was offered but deferred at this time.  Patient will return for steroid injection if the above treatment modalities do not provide durable relief  -Patient may follow up prn for further evaluation and treatment

## 2024-09-16 PROCEDURE — 88304 TISSUE EXAM BY PATHOLOGIST: CPT | Performed by: PATHOLOGY

## 2024-09-17 ENCOUNTER — LAB REQUISITION (OUTPATIENT)
Dept: LAB | Facility: HOSPITAL | Age: 34
End: 2024-09-17
Payer: COMMERCIAL

## 2024-09-17 DIAGNOSIS — D49.2 NEOPLASM OF UNSPECIFIED BEHAVIOR OF BONE, SOFT TISSUE, AND SKIN: ICD-10-CM

## 2024-09-19 PROCEDURE — 88304 TISSUE EXAM BY PATHOLOGIST: CPT | Performed by: PATHOLOGY

## 2024-10-03 ENCOUNTER — EVALUATION (OUTPATIENT)
Dept: PHYSICAL THERAPY | Facility: CLINIC | Age: 34
End: 2024-10-03
Payer: COMMERCIAL

## 2024-10-03 DIAGNOSIS — M70.62 GREATER TROCHANTERIC BURSITIS OF LEFT HIP: ICD-10-CM

## 2024-10-03 PROCEDURE — 97530 THERAPEUTIC ACTIVITIES: CPT | Performed by: PHYSICAL THERAPIST

## 2024-10-03 PROCEDURE — 97162 PT EVAL MOD COMPLEX 30 MIN: CPT | Performed by: PHYSICAL THERAPIST

## 2024-10-03 NOTE — PROGRESS NOTES
PT Evaluation     Today's date: 10/3/2024  Patient name: Shruthi Alcantar  : 1990  MRN: [id number]  Referring provider: Saturnino Benjamin MD  Dx:   Encounter Diagnosis     ICD-10-CM    1. Greater trochanteric bursitis of left hip  M70.62 Ambulatory Referral to Physical Therapy                     Assessment  Impairments: abnormal gait, activity intolerance, impaired physical strength, lacks appropriate home exercise program, pain with function, poor posture , poor body mechanics, activity limitations and endurance  Symptom irritability: moderate  Understanding of Dx/Px/POC: excellent     Prognosis: excellent    Goals  (Up to 6 weeks)  1. Independent and safe with HEP.  2. Independent and safe with self-stretching tech.  3. L hip MMT will increase by 1/2 grade t/o to max standing ADL's.  4. Independent with self-pain management to max quality of gait on all surfaces without any limitations.    Plan  Patient would benefit from: skilled physical therapy  Planned modality interventions: low level laser therapy    Planned therapy interventions: joint mobilization, manual therapy, neuromuscular re-education, patient/caregiver education, postural training, strengthening, stretching, therapeutic activities, therapeutic exercise, therapeutic training, home exercise program, graded exercise, graded activity, flexibility, gait training, body mechanics training, activity modification and abdominal trunk stabilization    Frequency: 2x week  Duration in weeks: 6  Treatment plan discussed with: patient        Subjective Evaluation    History of Present Illness  Date of onset: 8/3/2024  Mechanism of injury: Pt is 33 y/o female with Hx of L hip pain for about 2 months. No improvement noted. Pt went to see an ortho specialist, (-) x-ray. Pt was Dx with L hip GT bursitis. Pt was referred to [facility] for L hip evaluation and tx.  Current functional limitations: pain with squatting, pain with running/fast amb, pain with  sleeping on L side, uncomfortable after prolong standing.          Not a recurrent problem   Quality of life: fair    Patient Goals  Patient goals for therapy: decreased pain, increased strength, increased motion, independence with ADLs/IADLs and return to sport/leisure activities    Pain  Current pain ratin  At best pain ratin  At worst pain ratin  Quality: dull ache  Relieving factors: change in position, rest, medications and ice  Aggravating factors: standing, walking, stair climbing and running  Progression: no change    Social Support  Steps to enter house: yes (1 step)  Stairs in house: no   Lives in: one-story house  Lives with: alone    Employment status: working (prolong amb/standing)  Hand dominance: right      Diagnostic Tests  X-ray: abnormal  Treatments  Previous treatment: medication  Current treatment: medication        Objective     Static Posture   General Observations  Symmetrical weight bearing.     Head  Forward.    Shoulders  Rounded.    Thoracic Spine  Flattened thoracic spine.    Lumbar Spine   Flattened and decreased lordosis.     Hip   Hip (Left): Not externally rotated, not internally rotated, not abducted and no increased flexion.     Ankle/Foot   Ankle/Foot (Left): Pes planus and pronated.   Ankle/Foot (Right): Pes planus and pronated.     Postural Observations  Seated posture: fair  Standing posture: fair  Correction of posture: has no consistent effect      Palpation   Left   Tenderness of the gluteus medius.   Trigger point to gluteus medius.     Tenderness     Left Hip   Tenderness in the greater trochanter.     Lumbar Screen  Lumbar range of motion within normal limits.    Active Range of Motion     Lumbar   Normal active range of motion  Left Hip   Normal active range of motion  Flexion: with pain  External rotation (90/90): with pain  Internal rotation (90/90): with pain    Right Hip   Normal active range of motion    Passive Range of Motion     Lumbar   Normal passive  range of motion    Strength/Myotome Testing     Lumbar   Left   Heel walk: normal  Toe walk: normal    Right   Normal strength    Left Hip   Planes of Motion   Flexion: 4+  Abduction: 4+  Adduction: 4+  External rotation: 4  Internal rotation: 4    Isolated Muscles   Gluteus di: 5  Gluteus medius: 4  TFL: 4  Iliopsoas: 4    Right Hip   Normal muscle strength    Left Knee   Flexion: 5  Extension: 5    Left Ankle/Foot   Dorsiflexion: 5  Plantar flexion: 5    Tests     Lumbar     Left   Positive crossed SLR and passive SLR.     Ambulation     Ambulation: Level Surfaces   Ambulation without assistive device: independent    Ambulation: Stairs   Ascend stairs: independent  Pattern: reciprocal  Railings: without rails  Descend stairs: independent  Pattern: reciprocal  Railings: without rails  Curbs: independent    Observational Gait   Gait: antalgic   Left foot contact pattern: heel to toe  Right foot contact pattern: heel to toe  Base of support: normal    Functional Assessment        Comments  Normal u/l balance in standing.             Precautions: not any given, see pt's chart for details      Manuals 10/3            LA to L hip             L hip PROM/stretch                                       Neuro Re-Ed                          Bike/postural edu                                                                              Ther Ex             Supine HS stretch, strap use demo            Supine ITB stretch w strap demo            Supine hip flex stretch w strap demo            Butterfly stretch demo                                                                Ther Activity             HEP edu/reveiw 8' given                         Gait Training                                       Modalities

## 2024-10-07 ENCOUNTER — OFFICE VISIT (OUTPATIENT)
Dept: PHYSICAL THERAPY | Facility: CLINIC | Age: 34
End: 2024-10-07
Payer: COMMERCIAL

## 2024-10-07 DIAGNOSIS — M70.62 GREATER TROCHANTERIC BURSITIS OF LEFT HIP: Primary | ICD-10-CM

## 2024-10-07 PROCEDURE — 97112 NEUROMUSCULAR REEDUCATION: CPT

## 2024-10-07 PROCEDURE — 97110 THERAPEUTIC EXERCISES: CPT

## 2024-10-07 PROCEDURE — 97140 MANUAL THERAPY 1/> REGIONS: CPT

## 2024-10-07 NOTE — PROGRESS NOTES
"Daily Note     Today's date: 10/7/2024  Patient name: Shruthi Alcantar  : 1990  MRN: [id number]  Referring provider: Saturnino Benjamin MD  Dx:   Encounter Diagnosis     ICD-10-CM    1. Greater trochanteric bursitis of left hip  M70.62                      Subjective: Erna reports compliance to HEP, but feels about the same. She is having mild increase in L hip pain since she worked over the weekend.       Objective: See treatment diary below      Assessment: Tolerated treatment well. Erna demonstrated good execution of her stretching routine. Hip ROM needs work, more into ER. Positive response to STM more corie than LLLT. Continued PT would be beneficial to improve function.          Plan: Continue per plan of care.       Precautions: not any given, see pt's chart for details      Manuals 10/3 10/7           LA to L hip  5' SZ           L hip PROM/stretch  8'            STM  4'                        Neuro Re-Ed                          Bike/postural edu  8' lvl 3                                                                            Ther Ex             Supine HS stretch, strap use demo 3x30\"           Supine ITB stretch w strap demo 3x30\"           Supine hip flex stretch w strap demo 3x30\"           Butterfly stretch demo 3x30\"                                                               Ther Activity             HEP edu/reveiw 8' given                         Gait Training                                       Modalities                                            "

## 2024-10-09 ENCOUNTER — OFFICE VISIT (OUTPATIENT)
Dept: PHYSICAL THERAPY | Facility: CLINIC | Age: 34
End: 2024-10-09
Payer: COMMERCIAL

## 2024-10-09 DIAGNOSIS — M70.62 GREATER TROCHANTERIC BURSITIS OF LEFT HIP: Primary | ICD-10-CM

## 2024-10-09 PROCEDURE — 97110 THERAPEUTIC EXERCISES: CPT | Performed by: PHYSICAL THERAPIST

## 2024-10-09 NOTE — PROGRESS NOTES
"Daily Note     Today's date: 10/9/2024  Patient name: Shruthi Alcantar  : 1990  MRN: [id number]  Referring provider: Saturnino Benjamin MD  Dx:   Encounter Diagnosis     ICD-10-CM    1. Greater trochanteric bursitis of left hip  M70.62                      Subjective: Pt reports hip is improving.      Objective: See treatment diary below      Assessment: Pt tolerating stretching and manual very well. Added strengthening today which was very fatiguing toward the end of the session. Educated to continue stretching at home and will add strengthening in next visit.      Plan: Continue per plan of care.      Precautions: not any given, see pt's chart for details      Manuals 10/3 10/7 10/9          LA to L hip  5' SZ FH          L hip PROM/stretch  8'  FH          STM  4'                        Neuro Re-Ed                          Bike/postural edu  8' lvl 3 Elliptical 6'                                                                           Ther Ex             Supine HS stretch, strap use demo 3x30\"           Supine ITB stretch w strap demo 3x30\" 3x30''          Supine hip flex stretch w strap demo 3x30\" 3x30''          Butterfly stretch demo 3x30\"           SL Bridge   15x3'' Lt          Hip ABD   15x Lt          Clamshells   15x green          Side stepping   3 laps green          Hip Hike   2x10          Ther Activity             HEP edu/reveiw 8' given            Lateral step downs   2x10 6''          Gait Training                                       Modalities                                              "

## 2024-10-14 ENCOUNTER — APPOINTMENT (OUTPATIENT)
Dept: PHYSICAL THERAPY | Facility: CLINIC | Age: 34
End: 2024-10-14
Payer: COMMERCIAL

## 2024-10-22 ENCOUNTER — OFFICE VISIT (OUTPATIENT)
Dept: PHYSICAL THERAPY | Facility: CLINIC | Age: 34
End: 2024-10-22
Payer: COMMERCIAL

## 2024-10-22 DIAGNOSIS — M70.62 GREATER TROCHANTERIC BURSITIS OF LEFT HIP: Primary | ICD-10-CM

## 2024-10-22 PROCEDURE — 97110 THERAPEUTIC EXERCISES: CPT | Performed by: PHYSICAL THERAPIST

## 2024-10-22 PROCEDURE — 97140 MANUAL THERAPY 1/> REGIONS: CPT | Performed by: PHYSICAL THERAPIST

## 2024-10-22 NOTE — PROGRESS NOTES
"Daily Note     Today's date: 10/22/2024  Patient name: Shruthi Alcantar  : 1990  MRN: [id number]  Referring provider: Saturnino Benjamin MD  Dx:   Encounter Diagnosis     ICD-10-CM    1. Greater trochanteric bursitis of left hip  M70.62                      Subjective: Pt reports decrease intensity of L hip discomfort, but overall fatigue and ache at the end of the day still present (after prolong standing/amb).      Objective: See treatment diary below      Assessment: Tolerated treatment well. Patient demonstrated fatigue post treatment, exhibited good technique with therapeutic exercises, and would benefit from continued PTfor further manual tx and gradual progression go therex. HEP was updated and reviewed.      Plan: Continue per plan of care.  Progress treatment as tolerated.       Precautions: not any given, see pt's chart for details      Manuals 10/3 10/7 10/9 10/22         LA to L hip  5' SZ FH SZ         L hip PROM/stretch  8'  FH SZ         STM  4'  SZ                      Neuro Re-Ed                          Bike/postural edu  8' lvl 3 Elliptical 6' L2 10'                                                                          Ther Ex             Supine HS stretch, strap use demo 3x30\"           Bridge w t-ball    10x2         Rolling stool hip flex stretch    5x20\"         Standing LE AB w TB    10x2                                                Supine ITB stretch w strap demo 3x30\" 3x30'' 3x30\" w manual OP         Supine hip flex stretch w strap demo 3x30\" 3x30''          Butterfly stretch demo 3x30\"           SL Bridge   15x3'' Lt 15x3\" ea LE         Hip ABD   15x Lt          Clamshells   15x green 20x blue TB in supine         Side stepping   3 laps green          Hip Hike   2x10          Ther Activity             HEP edu/reveiw 8' given            Lateral step downs   2x10 6''          Gait Training                                       Modalities                                     "

## 2024-10-24 ENCOUNTER — OFFICE VISIT (OUTPATIENT)
Dept: PHYSICAL THERAPY | Facility: CLINIC | Age: 34
End: 2024-10-24
Payer: COMMERCIAL

## 2024-10-24 DIAGNOSIS — M70.62 GREATER TROCHANTERIC BURSITIS OF LEFT HIP: Primary | ICD-10-CM

## 2024-10-24 PROCEDURE — 97140 MANUAL THERAPY 1/> REGIONS: CPT | Performed by: PHYSICAL THERAPIST

## 2024-10-24 PROCEDURE — 97110 THERAPEUTIC EXERCISES: CPT | Performed by: PHYSICAL THERAPIST

## 2024-10-24 NOTE — PROGRESS NOTES
"Daily Note     Today's date: 10/24/2024  Patient name: Shruthi Alcantar  : 1990  MRN: [id number]  Referring provider: Saturnino Benjamin MD  Dx:   Encounter Diagnosis     ICD-10-CM    1. Greater trochanteric bursitis of left hip  M70.62                      Subjective: Pt reports some improvement since last tx. No pain prior to today's session.      Objective: See treatment diary below      Assessment: Tolerated treatment well. Patient demonstrated fatigue post treatment and would benefit from continued PT for further manual tx and progression of therex as helena. Pt is very motivated ad willing to participate in tx. No pain post tx verbalized.      Plan: Continue per plan of care.  Progress treatment as tolerated.       Precautions: not any given, see pt's chart for details      Manuals 10/3 10/7 10/9 10/22 10/24        LA to L hip  5' SZ FH SZ SZ        L hip PROM/stretch  8'  FH SZ SZ        STM  4'  SZ SZ                     Neuro Re-Ed                          Bike/postural edu  8' lvl 3 Elliptical 6' L2 10' L2 10'                                                                         Ther Ex             Supine HS stretch, strap use demo 3x30\"           Bridge w t-ball    10x2 10x2, 5\"        Rolling stool hip flex stretch    5x20\" 3x30\" ea LE        Standing LE AB w TB    10x2                                                Supine ITB stretch w strap demo 3x30\" 3x30'' 3x30\" w manual OP 3x30\" manual OP        Supine hip flex stretch w strap demo 3x30\" 3x30''          Butterfly stretch demo 3x30\"   3x30\"        SL Bridge   15x3'' Lt 15x3\" ea LE 10x2, 5\"        Hip ABD   15x Lt          Clamshells   15x green 20x blue TB in supine Black TB 20x supine ans 20x ea side        Side stepping   3 laps green  Black TB 10'x5        Hip Hike   2x10          Ther Activity             HEP edu/reveiw 8' given            Lateral step downs   2x10 6''          Gait Training                                     "   Modalities             Biofreeze to L hip    applied applied

## 2024-10-29 ENCOUNTER — OFFICE VISIT (OUTPATIENT)
Dept: PHYSICAL THERAPY | Facility: CLINIC | Age: 34
End: 2024-10-29
Payer: COMMERCIAL

## 2024-10-29 DIAGNOSIS — M70.62 GREATER TROCHANTERIC BURSITIS OF LEFT HIP: Primary | ICD-10-CM

## 2024-10-29 PROCEDURE — 97110 THERAPEUTIC EXERCISES: CPT | Performed by: PHYSICAL THERAPIST

## 2024-10-29 PROCEDURE — 97112 NEUROMUSCULAR REEDUCATION: CPT | Performed by: PHYSICAL THERAPIST

## 2024-10-29 NOTE — PROGRESS NOTES
"Daily Note     Today's date: 10/29/2024  Patient name: Shruthi Alcantar  : 1990  MRN: [id number]  Referring provider: Saturnino Benjamin MD  Dx:   Encounter Diagnosis     ICD-10-CM    1. Greater trochanteric bursitis of left hip  M70.62                      Subjective: Pt reports feeling better. No hip pain prior to tx.      Objective: See treatment diary below      Assessment: Tolerated treatment well. Patient exhibited good technique with therapeutic exercises and would benefit from continued PT for further progression of therex for LE strengthening. No pain post tx.       Plan: Continue per plan of care.  Progress treatment as tolerated.       Precautions: not any given, see pt's chart for details      Manuals 10/3 10/7 10/9 10/22 10/24 10/29       LA to L hip  5' SZ FH SZ SZ        L hip PROM/stretch  8'  FH SZ SZ        STM  4'  SZ SZ                     Neuro Re-Ed                          Bike/postural edu  8' lvl 3 Elliptical 6' L2 10' L2 10' L3 10'                    Elliptical/posture      L1 5'                                              Ther Ex             Supine HS stretch, strap use demo 3x30\"           Bridge w t-ball    10x2 10x2, 5\"        Rolling stool hip flex stretch    5x20\" 3x30\" ea LE FF/IR 3x30\" ea       Standing LE AB w TB    10x2         [place] SLR in all dir      10 lbs 10x2       Sit to stand      #4 t-ball 10x2       Open floor FF lunges      #4 50'x4                                                           Supine ITB stretch w strap demo 3x30\" 3x30'' 3x30\" w manual OP 3x30\" manual OP        Supine hip flex stretch w strap demo 3x30\" 3x30''          Butterfly stretch demo 3x30\"   3x30\"        SL Bridge   15x3'' Lt 15x3\" ea LE 10x2, 5\"        Hip ABD   15x Lt          Clamshells   15x green 20x blue TB in supine Black TB 20x supine ans 20x ea side        Side stepping   3 laps green  Black TB 10'x5        Hip Hike   2x10          Ther Activity             HEP edu/reveiw 8' " given            Lateral step downs   2x10 6''          Gait Training                                       Modalities             Biofreeze to L hip    applied applied applied

## 2024-10-31 ENCOUNTER — TELEPHONE (OUTPATIENT)
Dept: GASTROENTEROLOGY | Facility: CLINIC | Age: 34
End: 2024-10-31

## 2024-10-31 ENCOUNTER — TELEMEDICINE (OUTPATIENT)
Dept: GASTROENTEROLOGY | Facility: CLINIC | Age: 34
End: 2024-10-31
Payer: COMMERCIAL

## 2024-10-31 ENCOUNTER — OFFICE VISIT (OUTPATIENT)
Dept: PHYSICAL THERAPY | Facility: CLINIC | Age: 34
End: 2024-10-31
Payer: COMMERCIAL

## 2024-10-31 VITALS — HEIGHT: 66 IN | WEIGHT: 177 LBS | BODY MASS INDEX: 28.45 KG/M2

## 2024-10-31 DIAGNOSIS — K21.9 GASTROESOPHAGEAL REFLUX DISEASE, UNSPECIFIED WHETHER ESOPHAGITIS PRESENT: ICD-10-CM

## 2024-10-31 DIAGNOSIS — M70.62 GREATER TROCHANTERIC BURSITIS OF LEFT HIP: Primary | ICD-10-CM

## 2024-10-31 DIAGNOSIS — R10.13 EPIGASTRIC PAIN: ICD-10-CM

## 2024-10-31 PROCEDURE — 99214 OFFICE O/P EST MOD 30 MIN: CPT | Performed by: PHYSICIAN ASSISTANT

## 2024-10-31 PROCEDURE — 97112 NEUROMUSCULAR REEDUCATION: CPT | Performed by: PHYSICAL THERAPIST

## 2024-10-31 PROCEDURE — 97110 THERAPEUTIC EXERCISES: CPT | Performed by: PHYSICAL THERAPIST

## 2024-10-31 NOTE — TELEPHONE ENCOUNTER
----- Message from Lorrie Bartholomew PA-C sent at 10/31/2024  9:42 AM EDT -----  Please call pt to schedule 3 month follow-up. Thanks!

## 2024-10-31 NOTE — PROGRESS NOTES
Progress Note - Infectious Disease   Barb Palomo 52 y.o. female MRN: [id number]  Unit/Bed#: [facility] 522-01 Encounter: [id number]      Impression/Plan:  1.  C. difficile colitis.  Patient presented with weakness, weight loss, acute on chronic diarrhea.  8/16 C. difficile PCR and toxin EIA positive.  Continues to have significant loose stools despite 7 days of fidaxomicin and now 7 days of p.o. vancomycin.  She remains nontoxic without fever or leukocytosis.  Abdominal exam benign.  Unclear if ongoing symptoms are related to C. difficile alone or an alternative etiology as below.  Given recent positive testing there is low utility to repeat C. difficile testing.  Still having significant diarrhea.  Patient status post fecal transplantation although the prep seems to have been inadequate.  Still with ongoing diarrhea although some improvement.              -Holding high-dose oral vancomycin for now  -If patient does not improve with fecal transplant may need to repeat the transplant after a more aggressive prep  -Careful use of Questran  -GI follow-up     2.  Acute on chronic diarrhea.  The patient has had diarrhea and weight loss for the past year but reports diarrhea is usually 2-4 times daily.  She continues to have up to 10 stools daily since admission with minimal improvement on C. difficile treatment.  Fecal white blood cells negative.  Additional infectious testing including stool O&P, Giardia antigen, enteric panel negative.  Unclear if ongoing symptoms are solely due to C. difficile.  The patient has a diagnosis of chronic pancreatitis to be contributing.  Poorly controlled diabetes may also lead to diarrhea.  She may also be having postinfectious/functional diarrhea.  Continues to have substantial diarrhea.  Repeat CT of the abdomen pelvis with colonic thickening.  Pancreatic insufficiency may also be playing a role in the chronic process.  Status post fecal transplantation with some improvement in the  Virtual Regular Visit  Name: Shruthi Alcantar      : 1990      MRN: [id number]  Encounter Provider: Lorrie Bartholomew PA-C  Encounter Date: 10/31/2024   Encounter department: [facility] [place]    Verification of patient location:    Patient is located at Home in the following state in which I hold an active license [place]    Assessment & Plan  Gastroesophageal reflux disease, unspecified whether esophagitis present  EGD WNL with gastric bx negative for H.pylori, showing reactive gastritis and duodenal bx negative for celiac. Pt says she has not had any issues with pain or constipation. She says she would like to stay on omeprazole for now.  Orders:    omeprazole (PriLOSEC) 20 mg delayed release capsule; Take 1 capsule (20 mg total) by mouth daily 30 minutes before breakfast        Encounter provider Lorrie Bartholomew PA-C    The patient was identified by name and date of birth. Shruthi Alcantar was informed that this is a telemedicine visit and that the visit is being conducted through the Epic Embedded platform. She agrees to proceed..  My office door was closed. No one else was in the room.  She acknowledged consent and understanding of privacy and security of the video platform. The patient has agreed to participate and understands they can discontinue the visit at any time.    Patient is aware this is a billable service.     History of Present Illness     Shruthi Alcantar is a 34 y.o. female who presents for follow-up. EGD WNL with gastric bx negative for H.pylori, showing reactive gastritis and duodenal bx negative for celiac. Pt says she has not had any issues with pain or constipation. She says she would like to stay on omeprazole for now. Pt denies n/v, trouble swallowing or eating, heartburn, changes in bowel habits, unintentional weight loss, fevers, chills, night sweats, bloody or black BM.     History obtained from : patient  Review of Systems  Medical History  Reviewed by provider this encounter:       Past Medical History   Past Medical History:   Diagnosis Date    Anxiety 10/2023    Headache(784.0)     Middle school    Migraine     Otitis media     Varicella     Visual impairment     Wears contacts     Past Surgical History:   Procedure Laterality Date    WISDOM TOOTH EXTRACTION       Family History   Problem Relation Age of Onset    Hyperlipidemia Mother     Cancer Mother         Breast; leukemia    Hypertension Father     Heart attack Maternal Grandmother     Heart attack Maternal Grandfather     Cancer Paternal Grandmother     Hypertension Maternal Aunt     Hyperlipidemia Maternal Aunt     Hyperlipidemia Maternal Aunt     Hypertension Maternal Uncle     Lupus Family      Current Outpatient Medications on File Prior to Visit   Medication Sig Dispense Refill    busPIRone (BUSPAR) 10 mg tablet Take 1 tablet (10 mg total) by mouth 3 (three) times a day 270 tablet 1    Cholecalciferol (Vitamin D) 50 MCG (2000 UT) tablet Take 2,000 Units by mouth daily      ibuprofen (MOTRIN) 800 mg tablet Take 1 tablet (800 mg total) by mouth every 8 (eight) hours as needed for mild pain or moderate pain 30 tablet 2    meloxicam (Mobic) 15 mg tablet Take 1 tablet (15 mg total) by mouth daily 30 tablet 0    Multiple Vitamins-Minerals (MULTI ADULT GUMMIES PO) Take by mouth daily      Tranexamic Acid 650 MG TABS Take 1 tablet (650 mg total) by mouth 3 (three) times a day 30 tablet 7    [DISCONTINUED] omeprazole (PriLOSEC) 20 mg delayed release capsule TAKE 1 CAPSULE (20 MG TOTAL) BY MOUTH DAILY 30 MINUTES BEFORE BREAKFAST 90 capsule 1    Probiotic Product (PROBIOTIC-10 PO) Take 1 capsule by mouth in the morning      SUMAtriptan (IMITREX) 50 mg tablet Take 1 tablet (50 mg total) by mouth once as needed for migraine for up to 1 dose (Patient not taking: Reported on 7/30/2024) 9 tablet 0    triamcinolone (KENALOG) 0.1 % cream Apply topically 2 (two) times a day (Patient not taking: Reported on  diarrhea              -Treatment for C. difficile as above              -Close GI follow-up  -Continue pancreatic enzymes  -With inadequate prep, patient and ongoing diarrhea, patient may need repeat fecal transplant     3.  Failure to thrive, severe protein calorie malnutrition.  BMI 16.  The patient has had diarrhea and weight loss for the past year.  Status post EGD and colonoscopy May, colon biopsies without pathologic abnormalities.     4.  Poorly controlled type 2 diabetes with hyperglycemia.  Hemoglobin A1c 16.1%.  Patient was not using insulin at home.  Glycemic management per primary team     5.  Bacteriuria.  Patient received 3 doses of IV ceftriaxone.  CT imaging showed a distended bladder suggestive of retention which is likely contributing to her symptoms.  Per report pain was happening after urination likely due to skin irritation.  In the setting of C. difficile as above, avoid unnecessary antibiotics.  Stressed with the patient in detail the need to avoid antibiotics unless absolutely necessary    Discussed with the primary service the plan to monitor off oral vancomycin for now and they agree with the plan    Antibiotics:  Off antibiotics  Post transplant day 3    Subjective:  Patient has no fever, chills, sweats; no nausea, vomiting, still having diarrhea although modestly improved; no cough, shortness of breath; no pain. No new symptoms.    Objective:  Vitals:  Temp:  [98.4 °F (36.9 °C)-100.8 °F (38.2 °C)] 98.4 °F (36.9 °C)  HR:  [105-107] 106  Resp:  [18] 18  BP: (110-113)/(78-80) 113/80  SpO2:  [97 %-98 %] 98 %  Temp (24hrs), Av.6 °F (37.6 °C), Min:98.4 °F (36.9 °C), Max:100.8 °F (38.2 °C)  Current: Temperature: 98.4 °F (36.9 °C)    Physical Exam:   General Appearance:  Alert, interactive, nontoxic, no acute distress.   Throat: Oropharynx moist without lesions.    Lungs:   Clear to auscultation bilaterally; no wheezes, rhonchi or rales; respirations unlabored   Heart:  RRR; no murmur, rub or  11/8/2023) 15 g 1    valACYclovir (VALTREX) 1,000 mg tablet Take 1 tablet (1,000 mg total) by mouth 2 (two) times a day for 1 day (Patient not taking: Reported on 11/8/2023) 30 tablet 0     No current facility-administered medications on file prior to visit.   No Known Allergies   Current Outpatient Medications on File Prior to Visit   Medication Sig Dispense Refill    busPIRone (BUSPAR) 10 mg tablet Take 1 tablet (10 mg total) by mouth 3 (three) times a day 270 tablet 1    Cholecalciferol (Vitamin D) 50 MCG (2000 UT) tablet Take 2,000 Units by mouth daily      ibuprofen (MOTRIN) 800 mg tablet Take 1 tablet (800 mg total) by mouth every 8 (eight) hours as needed for mild pain or moderate pain 30 tablet 2    meloxicam (Mobic) 15 mg tablet Take 1 tablet (15 mg total) by mouth daily 30 tablet 0    Multiple Vitamins-Minerals (MULTI ADULT GUMMIES PO) Take by mouth daily      Tranexamic Acid 650 MG TABS Take 1 tablet (650 mg total) by mouth 3 (three) times a day 30 tablet 7    [DISCONTINUED] omeprazole (PriLOSEC) 20 mg delayed release capsule TAKE 1 CAPSULE (20 MG TOTAL) BY MOUTH DAILY 30 MINUTES BEFORE BREAKFAST 90 capsule 1    Probiotic Product (PROBIOTIC-10 PO) Take 1 capsule by mouth in the morning      SUMAtriptan (IMITREX) 50 mg tablet Take 1 tablet (50 mg total) by mouth once as needed for migraine for up to 1 dose (Patient not taking: Reported on 7/30/2024) 9 tablet 0    triamcinolone (KENALOG) 0.1 % cream Apply topically 2 (two) times a day (Patient not taking: Reported on 11/8/2023) 15 g 1    valACYclovir (VALTREX) 1,000 mg tablet Take 1 tablet (1,000 mg total) by mouth 2 (two) times a day for 1 day (Patient not taking: Reported on 11/8/2023) 30 tablet 0     No current facility-administered medications on file prior to visit.      Social History     Tobacco Use    Smoking status: Never    Smokeless tobacco: Never   Vaping Use    Vaping status: Never Used   Substance and Sexual Activity    Alcohol use: Yes      gallop   Abdomen:   Soft, non-tender, non-distended, positive bowel sounds.     Extremities: No clubbing, cyanosis or edema   Skin: No new rashes or lesions. No draining wounds noted.       Labs, Imaging, & Other studies:   All pertinent labs and imaging studies were personally reviewed  Results from last 7 days   Lab Units 09/09/24  1039 09/08/24  0443 09/07/24  0435   WBC Thousand/uL 8.19 5.64 7.89   HEMOGLOBIN g/dL 10.6* 10.0* 9.9*   PLATELETS Thousands/uL 261 266 259     Results from last 7 days   Lab Units 09/09/24  1039 09/08/24  0443 09/07/24  0435 09/05/24  0808 09/04/24  0603   SODIUM mmol/L 139 141 138   < > 139   POTASSIUM mmol/L 4.3 4.0 3.7   < > 4.3   CHLORIDE mmol/L 107 106 104   < > 105   CO2 mmol/L 26 28 26   < > 26   BUN mg/dL 17 15 10   < > 15   CREATININE mg/dL 0.45* 0.47* 0.54*   < > 0.49*   EGFR ml/min/1.73sq m 114 113 108   < > 111   CALCIUM mg/dL 8.8 8.7 8.5   < > 9.2   AST U/L  --   --   --   --  57*   ALT U/L  --   --   --   --  97*   ALK PHOS U/L  --   --   --   --  207*    < > = values in this interval not displayed.                                                "Comment: Once a month    Drug use: Never    Sexual activity: Never         Objective     Ht 5' 6\" (1.676 m)   Wt 80.3 kg (177 lb)   BMI 28.57 kg/m²   Physical Exam    Visit Time  Total Visit Duration: 15 minutes.         "

## 2024-10-31 NOTE — TELEPHONE ENCOUNTER
Spoke to pt and switched appt for 10/31 to a virtual due to provider being ill.  Pt verbalized understanding

## 2024-10-31 NOTE — PROGRESS NOTES
"Daily Note     Today's date: 10/31/2024  Patient name: Shruthi Alcantar  : 1990  MRN: [id number]  Referring provider: Saturnino Benjamin MD  Dx:   Encounter Diagnosis     ICD-10-CM    1. Greater trochanteric bursitis of left hip  M70.62                      Subjective: Pt reports gradually felling better. No worsening of symptoms since last tx.      Objective: See treatment diary below      Assessment: Tolerated treatment well. Patient demonstrated fatigue post treatment, exhibited good technique with therapeutic exercises, and would benefit from continued PTwith focus on LE and core strengthening. No pain post tx.       Plan: Continue per plan of care.  Progress treatment as tolerated.       Precautions: not any given, see pt's chart for details      Manuals 10/3 10/7 10/9 10/22 10/24 10/29 10/31      LA to L hip  5' SZ FH SZ SZ        L hip PROM/stretch  8'  FH SZ SZ        STM  4'  SZ SZ                     Neuro Re-Ed                          Bike/postural edu  8' lvl 3 Elliptical 6' L2 10' L2 10' L3 10' L4 10'                   Elliptical/posture      L1 5'                                              Ther Ex             Supine HS stretch, strap use demo 3x30\"           Bridge w t-ball    10x2 10x2, 5\"        Rolling stool hip flex stretch    5x20\" 3x30\" ea LE FF/IR 3x30\" ea FF/IR 3x30\" ea      Standing LE AB w TB    10x2         [place] SLR in all dir      10 lbs 10x2 15 lbs 10x2      Sit to stand      #4 t-ball 10x2 #4 and t-ball between knees 10x2, prolong hold at mid range      Open floor FF lunges      #4 50'x4       Foam mini squats and t-ball trampoline toss       #4 10x2      Foam u/l balance w t-ball trampoline toss       #41  10x2                                Supine ITB stretch w strap demo 3x30\" 3x30'' 3x30\" w manual OP 3x30\" manual OP        Supine hip flex stretch w strap demo 3x30\" 3x30''          Butterfly stretch demo 3x30\"   3x30\"        SL Bridge   15x3'' Lt 15x3\" ea LE 10x2, 5\"     "    Hip ABD   15x Lt          Clamshells   15x green 20x blue TB in supine Black TB 20x supine ans 20x ea side        Side stepping   3 laps green  Black TB 10'x5        Hip Hike   2x10          Ther Activity             HEP edu/reveiw 8' given            Lateral step downs   2x10 6''          Gait Training                                       Modalities             Biofreeze to L hip    applied applied applied

## 2024-11-04 DIAGNOSIS — R10.13 EPIGASTRIC PAIN: Primary | ICD-10-CM

## 2024-11-05 ENCOUNTER — APPOINTMENT (OUTPATIENT)
Dept: LAB | Facility: CLINIC | Age: 34
End: 2024-11-05
Payer: COMMERCIAL

## 2024-11-05 ENCOUNTER — OFFICE VISIT (OUTPATIENT)
Dept: PHYSICAL THERAPY | Facility: CLINIC | Age: 34
End: 2024-11-05
Payer: COMMERCIAL

## 2024-11-05 DIAGNOSIS — R10.13 EPIGASTRIC PAIN: ICD-10-CM

## 2024-11-05 DIAGNOSIS — E55.9 VITAMIN D DEFICIENCY: ICD-10-CM

## 2024-11-05 DIAGNOSIS — M70.62 GREATER TROCHANTERIC BURSITIS OF LEFT HIP: Primary | ICD-10-CM

## 2024-11-05 LAB
25(OH)D3 SERPL-MCNC: 34.1 NG/ML (ref 30–100)
VIT B12 SERPL-MCNC: 392 PG/ML (ref 180–914)

## 2024-11-05 PROCEDURE — 36415 COLL VENOUS BLD VENIPUNCTURE: CPT

## 2024-11-05 PROCEDURE — 97110 THERAPEUTIC EXERCISES: CPT | Performed by: PHYSICAL THERAPIST

## 2024-11-05 PROCEDURE — 82607 VITAMIN B-12: CPT

## 2024-11-05 PROCEDURE — 97112 NEUROMUSCULAR REEDUCATION: CPT | Performed by: PHYSICAL THERAPIST

## 2024-11-05 PROCEDURE — 97530 THERAPEUTIC ACTIVITIES: CPT | Performed by: PHYSICAL THERAPIST

## 2024-11-05 PROCEDURE — 82306 VITAMIN D 25 HYDROXY: CPT

## 2024-11-05 NOTE — PROGRESS NOTES
"Daily Note     Today's date: 2024  Patient name: Shruthi Alcantar  : 1990  MRN: [id number]  Referring provider: Saturnino Benjamin MD  Dx:   Encounter Diagnosis     ICD-10-CM    1. Greater trochanteric bursitis of left hip  M70.62                      Subjective: Pt is feeling better. No pain at this time verbalized.      Objective: See treatment diary below      Assessment: Tolerated treatment well. Patient exhibited good technique with therapeutic exercises and compliant with HEP. No pain post tx. Able to helena all ex without any limitations.      Plan: Continue per plan of care.  Potential discharge next visit.     Precautions: not any given, see pt's chart for details      Manuals 10/3 10/7 10/9 10/22 10/24 10/29 10/31 11/5     LA to L hip  5' SZ FH SZ SZ        L hip PROM/stretch  8'  FH SZ SZ        STM  4'  SZ SZ                     Neuro Re-Ed                          Bike/postural edu  8' lvl 3 Elliptical 6' L2 10' L2 10' L3 10' L4 10'                   Elliptical/posture      L1 5'                    T'dmill amb/posture/speed        2.5 mph 10'                  Ther Ex             Supine HS stretch, strap use demo 3x30\"           Bridge w t-ball    10x2 10x2, 5\"        Rolling stool hip flex stretch    5x20\" 3x30\" ea LE FF/IR 3x30\" ea FF/IR 3x30\" ea FF/IR 3x30 ea     Standing LE AB w TB    10x2         [place] SLR in all dir      10 lbs 10x2 15 lbs 10x2 17 lbs 10x2, 5\" ea     Sit to stand      #4 t-ball 10x2 #4 and t-ball between knees 10x2, prolong hold at mid range #8 and t-ball between knees 10x2     Open floor FF lunges      #4 50'x4       Foam mini squats and t-ball trampoline toss       #4 10x2  #6 10x2     Foam u/l balance w t-ball trampoline toss       #4  10x2 #6 10x2     Leg press        L6 10x3 B LE, L3 10x3                  Supine ITB stretch w strap demo 3x30\" 3x30'' 3x30\" w manual OP 3x30\" manual OP        Supine hip flex stretch w strap demo 3x30\" 3x30''          Butterfly " "stretch demo 3x30\"   3x30\"        SL Bridge   15x3'' Lt 15x3\" ea LE 10x2, 5\"        Hip ABD   15x Lt          Clamshells   15x green 20x blue TB in supine Black TB 20x supine ans 20x ea side        Side stepping   3 laps green  Black TB 10'x5        Hip Hike   2x10          Ther Activity             HEP edu/reveiw 8' given       8' review     Lateral step downs   2x10 6''          Gait Training                                       Modalities             Biofreeze to L hip    applied applied applied                                     "

## 2024-11-07 ENCOUNTER — OFFICE VISIT (OUTPATIENT)
Dept: PHYSICAL THERAPY | Facility: CLINIC | Age: 34
End: 2024-11-07
Payer: COMMERCIAL

## 2024-11-07 DIAGNOSIS — M70.62 GREATER TROCHANTERIC BURSITIS OF LEFT HIP: Primary | ICD-10-CM

## 2024-11-07 PROCEDURE — 97530 THERAPEUTIC ACTIVITIES: CPT | Performed by: PHYSICAL THERAPIST

## 2024-11-07 PROCEDURE — 97112 NEUROMUSCULAR REEDUCATION: CPT | Performed by: PHYSICAL THERAPIST

## 2024-11-07 PROCEDURE — 97110 THERAPEUTIC EXERCISES: CPT | Performed by: PHYSICAL THERAPIST

## 2024-11-07 NOTE — PROGRESS NOTES
"Daily Note     Today's date: 2024  Patient name: Shruthi Alcantar  : 1990  MRN: [id number]  Referring provider: Saturnino Benjamin MD  Dx:   Encounter Diagnosis     ICD-10-CM    1. Greater trochanteric bursitis of left hip  M70.62                      Subjective: Pt is improving, no pain thi morning.      Objective: See treatment diary below      Assessment: Tolerated treatment well. Patient demonstrated fatigue post treatment, exhibited good technique with therapeutic exercises, and would benefit from continued PT      Plan: Continue per plan of care.  Progress treatment as tolerated.       Precautions: not any given, see pt's chart for details      Manuals 10/3 10/7 10/9 10/22 10/24 10/29 10/31 11/5 11/7    LA to L hip  5' SZ FH SZ SZ        L hip PROM/stretch  8'  FH SZ SZ        STM  4'  SZ SZ                     Neuro Re-Ed                          Bike/postural edu  8' lvl 3 Elliptical 6' L2 10' L2 10' L3 10' L4 10'  L4 10'                 Elliptical/posture      L1 5'   L1 5'                 T'dmill amb/posture/speed        2.5 mph 10'                  Ther Ex             Supine HS stretch, strap use demo 3x30\"           Bridge w t-ball    10x2 10x2, 5\"        Rolling stool hip flex stretch    5x20\" 3x30\" ea LE FF/IR 3x30\" ea FF/IR 3x30\" ea FF/IR 3x30 ea FF/IR 3x30 ea    Standing LE AB w TB    10x2         [place] SLR in all dir      10 lbs 10x2 15 lbs 10x2 17 lbs 10x2, 5\" ea 17 lbs 10x3    Sit to stand      #4 t-ball 10x2 #4 and t-ball between knees 10x2, prolong hold at mid range #8 and t-ball between knees 10x2 #8 w t-ball between knees 10x3    Open floor FF lunges      #4 50'x4       Foam mini squats and t-ball trampoline toss       #4 10x2  #6 10x2 #6 10x3    Foam u/l balance w t-ball trampoline toss       #4  10x2 #6 10x2 #6 10x3    Leg press        L6 10x3 B LE, L3 10x3 L7 10x3 B LE, L3 10x3                 Supine ITB stretch w strap demo 3x30\" 3x30'' 3x30\" w manual OP 3x30\" manual OP     " "   Supine hip flex stretch w strap demo 3x30\" 3x30''          Butterfly stretch demo 3x30\"   3x30\"        SL Bridge   15x3'' Lt 15x3\" ea LE 10x2, 5\"        Hip ABD   15x Lt          Clamshells   15x green 20x blue TB in supine Black TB 20x supine ans 20x ea side        Side stepping   3 laps green  Black TB 10'x5        Hip Hike   2x10          Ther Activity             HEP edu/reveiw 8' given       8' review     Lateral step downs   2x10 6''          Gait Training                                       Modalities             Biofreeze to L hip    applied applied applied                                       "

## 2024-11-11 ENCOUNTER — HOSPITAL ENCOUNTER (OUTPATIENT)
Dept: RADIOLOGY | Facility: HOSPITAL | Age: 34
Discharge: HOME | End: 2024-11-11
Attending: INTERNAL MEDICINE
Payer: COMMERCIAL

## 2024-11-11 DIAGNOSIS — E04.1 THYROID NODULE: ICD-10-CM

## 2024-11-11 PROCEDURE — 76536 US EXAM OF HEAD AND NECK: CPT

## 2024-11-12 ENCOUNTER — OFFICE VISIT (OUTPATIENT)
Dept: PHYSICAL THERAPY | Facility: CLINIC | Age: 34
End: 2024-11-12
Payer: COMMERCIAL

## 2024-11-12 DIAGNOSIS — M70.62 GREATER TROCHANTERIC BURSITIS OF LEFT HIP: Primary | ICD-10-CM

## 2024-11-12 PROCEDURE — 97110 THERAPEUTIC EXERCISES: CPT | Performed by: PHYSICAL THERAPIST

## 2024-11-12 PROCEDURE — 97112 NEUROMUSCULAR REEDUCATION: CPT | Performed by: PHYSICAL THERAPIST

## 2024-11-12 NOTE — PROGRESS NOTES
"Daily Note     Today's date: 2024  Patient name: Shruthi Alcantar  : 1990  MRN: [id number]  Referring provider: Saturnino Benjamin MD  Dx:   Encounter Diagnosis     ICD-10-CM    1. Greater trochanteric bursitis of left hip  M70.62                      Subjective: No pain prior to tx.      Objective: See treatment diary below      Assessment: Tolerated treatment well. Patient exhibited good technique with therapeutic exercises and HEP review. No pain post tx. Pt is reaching max potential at this time.      Plan: Potential discharge next visit.     Precautions: not any given, see pt's chart for details      Manuals 10/3 10/7 10/9 10/22 10/24 10/29 10/31 11/5 11/7 11/12   LA to L hip  5' SZ FH SZ SZ        L hip PROM/stretch  8'  FH SZ SZ        STM  4'  SZ SZ                     Neuro Re-Ed                          Bike/postural edu  8' lvl 3 Elliptical 6' L2 10' L2 10' L3 10' L4 10'  L4 10' L4 10'                Elliptical/posture      L1 5'   L1 5'                 T'dmill amb/posture/speed        2.5 mph 10'                  Ther Ex             Supine HS stretch, strap use demo 3x30\"           Bridge w t-ball    10x2 10x2, 5\"        Rolling stool hip flex stretch    5x20\" 3x30\" ea LE FF/IR 3x30\" ea FF/IR 3x30\" ea FF/IR 3x30 ea FF/IR 3x30 ea FF/IR 3x30\" ea   Standing LE AB w TB    10x2         [place] SLR in all dir      10 lbs 10x2 15 lbs 10x2 17 lbs 10x2, 5\" ea 17 lbs 10x3 17 lbs 10x2 ea dir   Sit to stand      #4 t-ball 10x2 #4 and t-ball between knees 10x2, prolong hold at mid range #8 and t-ball between knees 10x2 #8 w t-ball between knees 10x3 Foam L LE single leg 10x2                Open floor FF lunges      #4 50'x4       Foam mini squats and t-ball trampoline toss       #4 10x2  #6 10x2 #6 10x3    Foam u/l balance w t-ball trampoline toss       #4  10x2 #6 10x2 #6 10x3    Leg press        L6 10x3 B LE, L3 10x3 L7 10x3 B LE, L3 10x3 L7 10x3 b LE, L4 10x3   Valslide use/lunges          Alt LE " "10x2 ea   Supine ITB stretch w strap demo 3x30\" 3x30'' 3x30\" w manual OP 3x30\" manual OP        Supine hip flex stretch w strap demo 3x30\" 3x30''          Butterfly stretch demo 3x30\"   3x30\"        SL Bridge   15x3'' Lt 15x3\" ea LE 10x2, 5\"        Hip ABD   15x Lt          Clamshells   15x green 20x blue TB in supine Black TB 20x supine ans 20x ea side        Side stepping   3 laps green  Black TB 10'x5        Hip Hike   2x10          Ther Activity             HEP edu/reveiw 8' given       8' review     Lateral step downs   2x10 6''          Gait Training                                       Modalities             Biofreeze to L hip    applied applied applied                                         "

## 2024-11-14 ENCOUNTER — OFFICE VISIT (OUTPATIENT)
Dept: PHYSICAL THERAPY | Facility: CLINIC | Age: 34
End: 2024-11-14
Payer: COMMERCIAL

## 2024-11-14 DIAGNOSIS — M70.62 GREATER TROCHANTERIC BURSITIS OF LEFT HIP: Primary | ICD-10-CM

## 2024-11-14 PROCEDURE — 97110 THERAPEUTIC EXERCISES: CPT | Performed by: PHYSICAL THERAPIST

## 2024-11-14 PROCEDURE — 97112 NEUROMUSCULAR REEDUCATION: CPT | Performed by: PHYSICAL THERAPIST

## 2024-11-14 NOTE — PROGRESS NOTES
"Daily Note     Today's date: 2024  Patient name: Shruthi Alcantar  : 1990  MRN: [id number]  Referring provider: Saturnino Benjamin MD  Dx:   Encounter Diagnosis     ICD-10-CM    1. Greater trochanteric bursitis of left hip  M70.62                      Subjective: No pain, no functional limitations verbalized.      Objective: See treatment diary below  (Met all goals)  1. Independent and safe with HEP.  2. Independent and safe with self-stretching tech.  3. L hip MMT will increase by 1/2 grade t/o to max standing ADL's.  4. Independent with self-pain management to max quality of gait on all surfaces without any limitations.    B LE ROM: WFL t/o - no limitations  B LE MMT:  t/o - no limitations  Gait and balance: normal on all surfaces, no limitations  Pain: none    Assessment: Tolerated treatment well. Patient exhibited good technique with therapeutic exercises and HEP tech. No pain post tx voiced. Able to perform all ex without any limitations. Max potential reached.      Plan: D/C PT at this time     Precautions: not any given, see pt's chart for details      Manuals 10/7 10/9 10/22 10/24 10/29 10/31 11/5 11/7 11/12 11/14   LA to L hip 5' SZ FH SZ SZ         L hip PROM/stretch 8'  FH SZ SZ         STM 4'  SZ SZ                      Neuro Re-Ed                          Bike/postural edu 8' lvl 3 Elliptical 6' L2 10' L2 10' L3 10' L4 10'  L4 10' L4 10' L4 10'                Elliptical/posture     L1 5'   L1 5'                  T'dmill amb/posture/speed       2.5 mph 10'                   Ther Ex             Supine HS stretch, strap use 3x30\"            Bridge w t-ball   10x2 10x2, 5\"         Rolling stool hip flex stretch   5x20\" 3x30\" ea LE FF/IR 3x30\" ea FF/IR 3x30\" ea FF/IR 3x30 ea FF/IR 3x30 ea FF/IR 3x30\" ea    Standing LE AB w TB   10x2          FF lunges          #8 10x2   Side lunges          #8 10x2   [place] SLR in all dir     10 lbs 10x2 15 lbs 10x2 17 lbs 10x2, 5\" ea 17 lbs 10x3 17 lbs " "10x2 ea dir 17 lbs 10x3 ea dir   Sit to stand     #4 t-ball 10x2 #4 and t-ball between knees 10x2, prolong hold at mid range #8 and t-ball between knees 10x2 #8 w t-ball between knees 10x3 Foam L LE single leg 10x2 Foam L LE single leg 10x2                Open floor FF lunges     #4 50'x4        Foam mini squats and t-ball trampoline toss      #4 10x2  #6 10x2 #6 10x3  #8 10x3   Foam u/l balance w t-ball trampoline toss      #4  10x2 #6 10x2 #6 10x3  #8 10x3   Leg press       L6 10x3 B LE, L3 10x3 L7 10x3 B LE, L3 10x3 L7 10x3 b LE, L4 10x3    Valslide use/lunges         Alt LE 10x2 ea    Supine ITB stretch w strap 3x30\" 3x30'' 3x30\" w manual OP 3x30\" manual OP         Supine hip flex stretch w strap 3x30\" 3x30''           Butterfly stretch 3x30\"   3x30\"         SL Bridge  15x3'' Lt 15x3\" ea LE 10x2, 5\"         Hip ABD  15x Lt           Clamshells  15x green 20x blue TB in supine Black TB 20x supine ans 20x ea side         Side stepping  3 laps green  Black TB 10'x5         Hip Hike  2x10           Ther Activity             HEP edu/reveiw       8' review      Lateral step downs  2x10 6''           Gait Training                                       Modalities             Biofreeze to L hip   applied applied applied                                            "

## 2024-11-15 ENCOUNTER — RESULTS FOLLOW-UP (OUTPATIENT)
Dept: ENDOCRINOLOGY | Facility: CLINIC | Age: 34
End: 2024-11-15

## 2024-12-03 DIAGNOSIS — M70.62 GREATER TROCHANTERIC BURSITIS OF LEFT HIP: ICD-10-CM

## 2024-12-05 RX ORDER — MELOXICAM 15 MG/1
15 TABLET ORAL DAILY
Qty: 30 TABLET | Refills: 1 | Status: SHIPPED | OUTPATIENT
Start: 2024-12-05

## 2024-12-14 ENCOUNTER — PATIENT MESSAGE (OUTPATIENT)
Dept: FAMILY MEDICINE CLINIC | Facility: CLINIC | Age: 34
End: 2024-12-14

## 2024-12-23 ENCOUNTER — TELEMEDICINE (OUTPATIENT)
Dept: FAMILY MEDICINE CLINIC | Facility: CLINIC | Age: 34
End: 2024-12-23
Payer: COMMERCIAL

## 2024-12-23 DIAGNOSIS — F41.9 ANXIETY: Primary | ICD-10-CM

## 2024-12-23 PROCEDURE — 99213 OFFICE O/P EST LOW 20 MIN: CPT | Performed by: NURSE PRACTITIONER

## 2024-12-23 RX ORDER — SERTRALINE HYDROCHLORIDE 25 MG/1
25 TABLET, FILM COATED ORAL DAILY
Qty: 90 TABLET | Refills: 0 | Status: SHIPPED | OUTPATIENT
Start: 2024-12-23 | End: 2025-12-18

## 2024-12-23 NOTE — ASSESSMENT & PLAN NOTE
Recent increase in anxiety symptoms reviewed  Appear panic attack like  No depression-no SI/HI  We will continue BuSpar at current dose: 10 mg 3 times daily  Will add in low-dose sertraline-25 mg daily  She will monitor/journal her symptoms  Follow-up in 4 weeks for symptom reassessment  Return to care sooner with problems or concerns  Advise she may send me a [employer] message if her symptoms have improved and no further follow-up is indicated at this time  If that is the case, she will follow-up for routine checkup in 6 months    ER precaution with any acute mental health change    Orders:    sertraline (ZOLOFT) 25 mg tablet; Take 1 tablet (25 mg total) by mouth daily

## 2024-12-23 NOTE — PROGRESS NOTES
Virtual Regular Visit  Name: Shruthi Alcantar      : 1990      MRN: [id number]  Encounter Provider: CHRIS Teague  Encounter Date: 2024   Encounter department: [facility]      Verification of patient location:  Patient is located at Home in the following state in which I hold an active license [place] :  Assessment & Plan  Anxiety  Recent increase in anxiety symptoms reviewed  Appear panic attack like  No depression-no SI/HI  We will continue BuSpar at current dose: 10 mg 3 times daily  Will add in low-dose sertraline-25 mg daily  She will monitor/journal her symptoms  Follow-up in 4 weeks for symptom reassessment  Return to care sooner with problems or concerns  Advise she may send me a [employer] message if her symptoms have improved and no further follow-up is indicated at this time  If that is the case, she will follow-up for routine checkup in 6 months    ER precaution with any acute mental health change    Orders:    sertraline (ZOLOFT) 25 mg tablet; Take 1 tablet (25 mg total) by mouth daily    Anxiety               Encounter provider CHRIS Teague    The patient was identified by name and date of birth. Shruthi Alcantar was informed that this is a telemedicine visit and that the visit is being conducted through the Epic Embedded platform. She agrees to proceed..  My office door was closed. The patient was notified the following individuals were present in the room jazmine Gunderson.  She acknowledged consent and understanding of privacy and security of the video platform. The patient has agreed to participate and understands they can discontinue the visit at any time.    Patient is aware this is a billable service.     History of Present Illness     Patient presents today to discuss recent increase in anxiety.  Has been noting it more frequently.  Panic-like symptoms with sometimes associated chest tightness, dizziness, inability to take a deep breath.   Reports symptoms are occurring similar to before she started antianxiety meds.  No depression.  No SI/HI      Review of Systems   Constitutional: Negative.    Respiratory:  Positive for chest tightness and shortness of breath.    Cardiovascular: Negative.    Neurological:  Positive for dizziness. Negative for headaches.   Psychiatric/Behavioral:  Negative for self-injury, sleep disturbance and suicidal ideas. The patient is nervous/anxious.        Objective   There were no vitals taken for this visit.    Physical Exam  Constitutional:       General: She is not in acute distress.     Appearance: Normal appearance. She is well-developed. She is not ill-appearing.   Pulmonary:      Effort: Pulmonary effort is normal. No respiratory distress.   Neurological:      Mental Status: She is alert and oriented to person, place, and time.   Psychiatric:         Attention and Perception: Attention normal.         Mood and Affect: Mood normal.         Behavior: Behavior normal.         Visit Time  Total Visit Duration: 15

## 2025-01-08 ENCOUNTER — HOSPITAL ENCOUNTER (OUTPATIENT)
Dept: RADIOLOGY | Facility: HOSPITAL | Age: 35
Discharge: HOME | End: 2025-01-08
Attending: OBSTETRICS & GYNECOLOGY
Payer: COMMERCIAL

## 2025-01-08 DIAGNOSIS — D21.9 FIBROIDS: ICD-10-CM

## 2025-01-08 PROCEDURE — 76856 US EXAM PELVIC COMPLETE: CPT

## 2025-01-13 ENCOUNTER — TELEPHONE (OUTPATIENT)
Dept: OBGYN CLINIC | Facility: CLINIC | Age: 35
End: 2025-01-13

## 2025-01-13 NOTE — TELEPHONE ENCOUNTER
Phone call to patient, reviewed pelvic ultrasound 1/8/2025, history of multifibroid uterus with significant increase growth, 6.7 cm posterior subserosal, 5.2 right lower uterine subserosal, 7.4 fundal subserosal, 2.9 left lateral intramural.  At times she does feel pelvic pressure.  She did have a consult with DAIANA 8/2024 with last ultrasound, recommending that fibroids should not interfere with future pregnancy.  Reviewed updated ultrasound and request that she review this with infertility once again.  Briefly discussed surgical intervention.  All questions answered.  She will keep me updated.

## 2025-01-14 ENCOUNTER — OFFICE VISIT (OUTPATIENT)
Dept: OBGYN CLINIC | Facility: CLINIC | Age: 35
End: 2025-01-14
Payer: COMMERCIAL

## 2025-01-14 VITALS — WEIGHT: 176.6 LBS | SYSTOLIC BLOOD PRESSURE: 126 MMHG | DIASTOLIC BLOOD PRESSURE: 76 MMHG | BODY MASS INDEX: 28.5 KG/M2

## 2025-01-14 DIAGNOSIS — N92.0 MENORRHAGIA WITH REGULAR CYCLE: ICD-10-CM

## 2025-01-14 DIAGNOSIS — D25.1 INTRAMURAL AND SUBSEROUS LEIOMYOMA OF UTERUS: Primary | ICD-10-CM

## 2025-01-14 DIAGNOSIS — D25.2 INTRAMURAL AND SUBSEROUS LEIOMYOMA OF UTERUS: Primary | ICD-10-CM

## 2025-01-14 DIAGNOSIS — R10.2 PELVIC PAIN: ICD-10-CM

## 2025-01-14 PROBLEM — G43.909 MIGRAINES: Status: ACTIVE | Noted: 2025-01-14

## 2025-01-14 PROCEDURE — 99215 OFFICE O/P EST HI 40 MIN: CPT | Performed by: OBSTETRICS & GYNECOLOGY

## 2025-01-14 NOTE — ASSESSMENT & PLAN NOTE
Fibroid tumor treatments/plan of care reviewed.   Observation, use of medication to mitigate symptoms, removal, ablation of fibroids.   3 fibroids are subserosal, last is intramural ( smallest)  Plan for Robotic assisted laparoscopic myomectomy with exam under anesthesia    We reviewed the risks of the surgery including but not limited to infection, bleeding, injury to nearby organs (bowel. bladder, ureter, blood vessel, nerve) and possible long term consequences of such injury, as well as possibility of oopherectomy, conversion to laparotomy, need for blood transfusion and other resuscitative measures.          Problem      Intramural and Subserous Leiomyoma of Uterus    The uterus is anteverted in position, measuring 13.8 x 8.6 x 6.4 cm.  Multilobulated uterine contour.  6.7 x 4.8 x 6.2 cm posterior body subserosal fibroid, volume 103.4 mL previously 55.6 mL.  5.1 x 4.4 x 5.2 cm right right lower uterine segment subserosal fibroid, volume 61.4 mL previously 31.8 mL.  6.3 x 4.6 x 7.4 cm fundal subserosal fibroid, volume 112.5 mL previously 54.2 mL.  2.9 x 1.7 x 2.2 cm left lateral lower uterine body intramural fibroid, volume 5.5 mL previously 1.7 mL.

## 2025-01-14 NOTE — PROGRESS NOTES
[facility]      : 1990      MRN: [id number]  Encounter Provider: Bisi Gomez MD  Encounter Date: 2025   Encounter department: [facility]  :  Assessment & Plan  Intramural and subserous leiomyoma of uterus  Fibroid tumor treatments/plan of care reviewed.   Observation, use of medication to mitigate symptoms, removal, ablation of fibroids.   3 fibroids are subserosal, last is intramural ( smallest)  Plan for Robotic assisted laparoscopic myomectomy with exam under anesthesia    We reviewed the risks of the surgery including but not limited to infection, bleeding, injury to nearby organs (bowel. bladder, ureter, blood vessel, nerve) and possible long term consequences of such injury, as well as possibility of oopherectomy, conversion to laparotomy, need for blood transfusion and other resuscitative measures.          Problem      Intramural and Subserous Leiomyoma of Uterus    The uterus is anteverted in position, measuring 13.8 x 8.6 x 6.4 cm.  Multilobulated uterine contour.  6.7 x 4.8 x 6.2 cm posterior body subserosal fibroid, volume 103.4 mL previously 55.6 mL.  5.1 x 4.4 x 5.2 cm right right lower uterine segment subserosal fibroid, volume 61.4 mL previously 31.8 mL.  6.3 x 4.6 x 7.4 cm fundal subserosal fibroid, volume 112.5 mL previously 54.2 mL.  2.9 x 1.7 x 2.2 cm left lateral lower uterine body intramural fibroid, volume 5.5 mL previously 1.7 mL.            Menorrhagia with regular cycle  Treatment options for menorrhagia discussed:    Observation with treatment of anemia, optimize nutrition  Medications: hormones ( pill, IUD, injection, ring, patch, implant, combined and progestin only) and TXA  Surgery: D&C, Hysteroscopy, Ablation, Hysterectomy, myonmectomy         Pelvic pain  Size of fibroids is most likely cause of pelvic pain. Do need to keep in differential endometriosis, non gyn causes : musculoskeletal, urologic, GI.   After fibroid removal and recovery can readdress if pelvic pain persists.            History of Present Illness   HPI  Shruthi Alcantar is a 34 y.o. female who presents for fibroid consult. Pelvic US 1/8/25. Fibroids are most bothersome during the time of her cycle. Heavy flow and typically crampy. Regular cycles.   History obtained from: patient    Review of Systems       Objective   /76 (BP Location: Left arm, Patient Position: Sitting, Cuff Size: Standard)   Wt 80.1 kg (176 lb 9.6 oz)   LMP 01/04/2025 (Exact Date)   BMI 28.50 kg/m²      Physical Exam  Constitutional:       General: She is not in acute distress.     Appearance: She is well-developed. She is not diaphoretic.   Neck:      Vascular: No JVD.   Cardiovascular:      Rate and Rhythm: Normal rate and regular rhythm.      Heart sounds: Normal heart sounds. No murmur heard.     No friction rub. No gallop.   Pulmonary:      Effort: Pulmonary effort is normal. No respiratory distress.      Breath sounds: Normal breath sounds.   Abdominal:      General: Bowel sounds are normal. There is no distension.      Palpations: Abdomen is soft.      Tenderness: There is no abdominal tenderness. There is no guarding or rebound.      Comments: Uterus palpated midline, 18 week size mobile   Genitourinary:     Labia:         Right: No rash, tenderness or lesion.         Left: No rash, tenderness or lesion.       Vagina: Normal. No vaginal discharge, erythema, tenderness or bleeding.      Cervix: No cervical motion tenderness, discharge, friability or erythema.      Uterus: Enlarged (bulky, mobile). Not deviated, not fixed and not tender.       Adnexa:         Right: No mass, tenderness or fullness.          Left: No mass, tenderness or fullness.     Musculoskeletal:      Cervical back: Neck supple.      Right lower leg: No edema.      Left lower leg: No edema.   Lymphadenopathy:      Cervical: No cervical adenopathy.   Neurological:      Mental Status: She is  alert and oriented to person, place, and time.      Deep Tendon Reflexes: Reflexes are normal and symmetric.         Administrative Statements   I have spent a total time of 45 minutes in caring for this patient on the day of the visit/encounter including Diagnostic results, Prognosis, Risks and benefits of tx options, Instructions for management, Risk factor reductions, Impressions, Counseling / Coordination of care, Documenting in the medical record, Reviewing / ordering tests, medicine, procedures  , Obtaining or reviewing history  , and Communicating with other healthcare professionals .

## 2025-01-14 NOTE — LETTER
January 14, 2025     Parisa Estrella, DO  [address]   [address]  [place] [address]      Patient: Shruthi Alcantar   YOB: 1990   Date of Visit: 1/14/2025       Dear Parisa,     Thank you for referring Shruthi Alcantar to me for evaluation. Below are the relevant portions of my H&P.    Plan is for a robotic assisted laparoscopic myomectomy.      If you have questions, please do not hesitate to call me. I look forward to following Shruthi along with you.         Sincerely,        Bisi Gomez MD        CC: No Recipients

## 2025-01-14 NOTE — ASSESSMENT & PLAN NOTE
Treatment options for menorrhagia discussed:    Observation with treatment of anemia, optimize nutrition  Medications: hormones ( pill, IUD, injection, ring, patch, implant, combined and progestin only) and TXA  Surgery: D&C, Hysteroscopy, Ablation, Hysterectomy, myonmectomy          normal gait and station , no tenderness or deformities present

## 2025-01-15 ENCOUNTER — TELEPHONE (OUTPATIENT)
Dept: OBGYN CLINIC | Facility: CLINIC | Age: 35
End: 2025-01-15

## 2025-01-15 NOTE — TELEPHONE ENCOUNTER
----- Message from Bisi Gomez MD sent at 2025  1:50 PM EST -----  Regarding: robotic myomectomy  [facility] GYN Department  Surgery Scheduling Sheet    Patient Name: Shruthi Alcantar  : 1990    Provider: Bisi Gomez MD     Needed: no Language: N/A    Procedure: exam under anesthesia and Robotic assisted laparoscopic myomectomy  Diagnosis: Fibroids, menorrhagia pelvic pain    Special Needs or Equipment: # - can we see if I can get the morcellation device that is used at [place] by Dr. Ohara?  Would need the rep present for the procedure.     Anesthesia: general anesthesia    Length of stay: outpatient  Does patient have  comorbid conditions that will require close perioperative monitoring prior to safe discharge: no  The patient has comorbid conditions that will require close perioperative monitoring prior to safe discharge, including N/A. This may require acute care beyond the usual and routine recovery period. As such, inpatient admission post-operatively is expected and appropriate, and anticipated hospital length of stay will be >2 midnights.      Pre-Admission Testing Needed: yes   Labs ordered: cbc, hgb A1C, and type and screen    PAT's recommended in prep for procedure ordered?: Yes    Medical Clearance Needed: no; Provider:     MA Form Signed (tubals/hysterectomy): Not Indicated  Surgical Drink Given: yes     How many days out of work: 1 week(s)     How many days no drivin week(s)       Are other appointments needed?  yes  Interval for post op appt: 1 week(s)     For Surgical Scheduler:  Pre-op Appt:   Post op Appt:  Consult/medical clearance appt:

## 2025-01-15 NOTE — TELEPHONE ENCOUNTER
Talked to patient she is scheduled for her surgical procedure on 2/19/2025 with Dr. Gomez at the [facility]. Patient is aware of lab work needed prior to her surgical procedure.    Curettage Text: The wound bed was treated with curettage after the biopsy was performed.

## 2025-01-22 ENCOUNTER — TELEPHONE (OUTPATIENT)
Age: 35
End: 2025-01-22

## 2025-01-22 NOTE — TELEPHONE ENCOUNTER
Pt called in stating she faxed over her medical leave paperwork today, wants to be sure it was received. Nothing scanned into chart. Called over to [employer] [place] clerical and spoke with Meghan. Advised nothing was received but to obtain pts  name and phone number. Name is Felipe Azevedo and number she had was [phone]. Information sent to Meghan in teams message per request.

## 2025-02-10 ENCOUNTER — APPOINTMENT (OUTPATIENT)
Dept: LAB | Facility: CLINIC | Age: 35
End: 2025-02-10
Payer: COMMERCIAL

## 2025-02-10 ENCOUNTER — LAB REQUISITION (OUTPATIENT)
Dept: LAB | Facility: HOSPITAL | Age: 35
End: 2025-02-10
Payer: COMMERCIAL

## 2025-02-10 ENCOUNTER — TRANSCRIBE ORDERS (OUTPATIENT)
Dept: LAB | Facility: CLINIC | Age: 35
End: 2025-02-10

## 2025-02-10 DIAGNOSIS — Z01.818 ENCOUNTER FOR OTHER PREPROCEDURAL EXAMINATION: ICD-10-CM

## 2025-02-10 DIAGNOSIS — Z01.818 PRE-OP TESTING: Primary | ICD-10-CM

## 2025-02-10 DIAGNOSIS — Z01.818 PRE-OP TESTING: ICD-10-CM

## 2025-02-10 LAB
ABO GROUP BLD: NORMAL
BLD GP AB SCN SERPL QL: NEGATIVE
ERYTHROCYTE [DISTWIDTH] IN BLOOD BY AUTOMATED COUNT: 12.3 % (ref 11.6–15.1)
HCT VFR BLD AUTO: 38.9 % (ref 34.8–46.1)
HGB BLD-MCNC: 13 G/DL (ref 11.5–15.4)
MCH RBC QN AUTO: 28.6 PG (ref 26.8–34.3)
MCHC RBC AUTO-ENTMCNC: 33.4 G/DL (ref 31.4–37.4)
MCV RBC AUTO: 86 FL (ref 82–98)
PLATELET # BLD AUTO: 264 THOUSANDS/UL (ref 149–390)
PMV BLD AUTO: 9.9 FL (ref 8.9–12.7)
RBC # BLD AUTO: 4.54 MILLION/UL (ref 3.81–5.12)
RH BLD: POSITIVE
SPECIMEN EXPIRATION DATE: NORMAL
WBC # BLD AUTO: 4.97 THOUSAND/UL (ref 4.31–10.16)

## 2025-02-10 PROCEDURE — 86850 RBC ANTIBODY SCREEN: CPT | Performed by: OBSTETRICS & GYNECOLOGY

## 2025-02-10 PROCEDURE — 85027 COMPLETE CBC AUTOMATED: CPT

## 2025-02-10 PROCEDURE — 86900 BLOOD TYPING SEROLOGIC ABO: CPT | Performed by: OBSTETRICS & GYNECOLOGY

## 2025-02-10 PROCEDURE — 36415 COLL VENOUS BLD VENIPUNCTURE: CPT

## 2025-02-10 PROCEDURE — 86901 BLOOD TYPING SEROLOGIC RH(D): CPT | Performed by: OBSTETRICS & GYNECOLOGY

## 2025-02-10 RX ORDER — AMPICILLIN TRIHYDRATE 250 MG
50 CAPSULE ORAL
COMMUNITY

## 2025-02-10 RX ORDER — ACETAMINOPHEN 500 MG
500 TABLET ORAL EVERY 6 HOURS PRN
COMMUNITY

## 2025-02-10 NOTE — PRE-PROCEDURE INSTRUCTIONS
Pre-Surgery Instructions:   Medication Instructions    acetaminophen (TYLENOL) 500 mg tablet Uses PRN- OK to take day of surgery    busPIRone (BUSPAR) 10 mg tablet Take day of surgery.    Cholecalciferol (Vitamin D) 50 MCG (2000 UT) tablet Stop taking 7 days prior to surgery.    Co Q-10 50 MG Stop taking 7 days prior to surgery.    ibuprofen (MOTRIN) 800 mg tablet Stop taking 7 days prior to surgery.    omeprazole (PriLOSEC) 20 mg delayed release capsule Take day of surgery.    sertraline (ZOLOFT) 25 mg tablet Take day of surgery.    SUMAtriptan (IMITREX) 50 mg tablet Uses PRN- OK to take day of surgery    Tranexamic Acid 650 MG TABS Uses PRN- DO NOT take day of surgery    Medication instructions for day surgery reviewed. Please use only a sip of water to take your instructed medications. Avoid all over the counter vitamins, supplements and NSAIDS for one week prior to surgery per anesthesia guidelines. Tylenol is ok to take as needed.     You will receive a call one business day prior to surgery with an arrival time and hospital directions. If your surgery is scheduled on a Monday, the hospital will be calling you on the Friday prior to your surgery. If you have not heard from anyone by 8pm, please call the hospital supervisor through the hospital  at [phone]. ([place] [phone] or [place] [phone]).    Do not eat or drink anything after midnight the night before your surgery, including candy, mints, lifesavers, or chewing gum. Do not drink alcohol 24hrs before your surgery. Try not to smoke at least 24hrs before your surgery.       Follow the pre surgery showering instructions as listed in the “My Surgical Experience Booklet” or otherwise provided by your surgeon's office. Do not use a blade to shave the surgical area 1 week before surgery. It is okay to use a clean electric clippers up to 24 hours before surgery. Do not apply any lotions, creams, including makeup, cologne, deodorant, or  perfumes after showering on the day of your surgery. Do not use dry shampoo, hair spray, hair gel, or any type of hair products.     No contact lenses, eye make-up, or artificial eyelashes. Remove nail polish, including gel polish, and any artificial, gel, or acrylic nails if possible. Remove all jewelry including rings and body piercing jewelry.     Wear causal clothing that is easy to take on and off. Consider your type of surgery.    Keep any valuables, jewelry, piercings at home. Please bring any specially ordered equipment (sling, braces) if indicated.    Arrange for a responsible person to drive you to and from the hospital on the day of your surgery. Please confirm the visitor policy for the day of your procedure when you receive your phone call with an arrival time.     Call the surgeon's office with any new illnesses, exposures, or additional questions prior to surgery.    Please reference your “My Surgical Experience Booklet” for additional information to prepare for your upcoming surgery.

## 2025-02-13 ENCOUNTER — OFFICE VISIT (OUTPATIENT)
Dept: ENDOCRINOLOGY | Facility: CLINIC | Age: 35
End: 2025-02-13
Payer: COMMERCIAL

## 2025-02-13 VITALS
DIASTOLIC BLOOD PRESSURE: 80 MMHG | SYSTOLIC BLOOD PRESSURE: 124 MMHG | HEART RATE: 82 BPM | WEIGHT: 181.2 LBS | HEIGHT: 66 IN | BODY MASS INDEX: 29.12 KG/M2

## 2025-02-13 DIAGNOSIS — E06.3 HASHIMOTO'S THYROIDITIS: Primary | ICD-10-CM

## 2025-02-13 DIAGNOSIS — R74.8 LOW SERUM ALKALINE PHOSPHATASE: ICD-10-CM

## 2025-02-13 DIAGNOSIS — E04.1 THYROID NODULE: ICD-10-CM

## 2025-02-13 DIAGNOSIS — E55.9 VITAMIN D DEFICIENCY: ICD-10-CM

## 2025-02-13 PROCEDURE — 99214 OFFICE O/P EST MOD 30 MIN: CPT | Performed by: INTERNAL MEDICINE

## 2025-02-13 NOTE — PROGRESS NOTES
Shruthi LOGAN [facility] 35 y.o. female MRN: [id number]    Encounter: [id number]      Assessment & Plan     Problem List Items Addressed This Visit          Endocrine    Hashimoto's thyroiditis - Primary    Thyroid function test should be monitored on a yearly basis         Relevant Orders    TSH, 3rd generation    Thyroid nodule    Will repeat thyroid ultrasound later this year and if still stable she will not need any further imaging         Relevant Orders    T4, free    US thyroid       Other    Low serum alkaline phosphatase    Alk phos normalized         Relevant Orders    Comprehensive metabolic panel    Vitamin D deficiency    Continue supplementations         Relevant Orders    Vitamin D 25 hydroxy    PTH, intact      CC:   Hashimoto thyroiditis     History of Present Illness     HPI:  35-year-old female with Hashimoto thyroiditis, thyroid nodule and vitamin D deficiency seen in follow-up    Feels well, no obstructive symptoms   Weight stable   No constipation   Menstrual cycles regular   No myalgias /arthralgias     For vitamin D deficiency she is currently on Vitamin D3 2000 IU DAILY       Review of Systems    Historical Information   Past Medical History:   Diagnosis Date    Anxiety 10/2023    GERD (gastroesophageal reflux disease)     Headache(784.0)     Middle school    Irritable bowel syndrome     Migraine     Otitis media     Varicella     Visual impairment     Wears contacts     Past Surgical History:   Procedure Laterality Date    WISDOM TOOTH EXTRACTION       Social History   Social History     Substance and Sexual Activity   Alcohol Use Yes    Comment: Once a month     Social History     Substance and Sexual Activity   Drug Use Never     Social History     Tobacco Use   Smoking Status Never   Smokeless Tobacco Never     Family History:   Family History   Problem Relation Age of Onset    Hyperlipidemia Mother     Cancer Mother         Breast; leukemia    Osteoporosis Mother     Hypertension Father      "Heart attack Maternal Grandmother     Heart attack Maternal Grandfather     Cancer Paternal Grandmother     Hypertension Maternal Aunt     Hyperlipidemia Maternal Aunt     Hyperlipidemia Maternal Aunt     Hypertension Maternal Uncle     Lupus Family        Meds/Allergies   Current Outpatient Medications   Medication Sig Dispense Refill    acetaminophen (TYLENOL) 500 mg tablet Take 500 mg by mouth every 6 (six) hours as needed for mild pain      busPIRone (BUSPAR) 10 mg tablet Take 1 tablet (10 mg total) by mouth 3 (three) times a day 270 tablet 1    Cholecalciferol (Vitamin D) 50 MCG (2000 UT) tablet Take 2,000 Units by mouth daily      Co Q-10 50 MG Take 50 mg by mouth      ibuprofen (MOTRIN) 800 mg tablet Take 1 tablet (800 mg total) by mouth every 8 (eight) hours as needed for mild pain or moderate pain 30 tablet 2    Multiple Vitamins-Minerals (MULTI ADULT GUMMIES PO) Take by mouth daily      omeprazole (PriLOSEC) 20 mg delayed release capsule Take 1 capsule (20 mg total) by mouth daily 30 minutes before breakfast 90 capsule 1    sertraline (ZOLOFT) 25 mg tablet Take 1 tablet (25 mg total) by mouth daily 90 tablet 0    SUMAtriptan (IMITREX) 50 mg tablet Take 1 tablet (50 mg total) by mouth once as needed for migraine for up to 1 dose 9 tablet 0    Tranexamic Acid 650 MG TABS Take 1 tablet (650 mg total) by mouth 3 (three) times a day 30 tablet 7     No current facility-administered medications for this visit.     No Known Allergies    Objective   Vitals: Blood pressure 124/80, pulse 82, height 5' 6\" (1.676 m), weight 82.2 kg (181 lb 3.2 oz), last menstrual period 02/03/2025, not currently breastfeeding.    Physical Exam  Vitals reviewed.   Constitutional:       General: She is not in acute distress.     Appearance: Normal appearance. She is not ill-appearing, toxic-appearing or diaphoretic.   HENT:      Head: Normocephalic and atraumatic.   Eyes:      General: No scleral icterus.     Extraocular Movements: " "Extraocular movements intact.   Cardiovascular:      Rate and Rhythm: Normal rate and regular rhythm.      Heart sounds: Normal heart sounds. No murmur heard.  Pulmonary:      Effort: Pulmonary effort is normal. No respiratory distress.      Breath sounds: Normal breath sounds. No wheezing or rales.   Musculoskeletal:      Cervical back: Neck supple.      Right lower leg: No edema.      Left lower leg: No edema.   Lymphadenopathy:      Cervical: No cervical adenopathy.   Skin:     General: Skin is warm and dry.   Neurological:      General: No focal deficit present.      Mental Status: She is alert and oriented to person, place, and time.      Gait: Gait normal.   Psychiatric:         Mood and Affect: Mood normal.         Behavior: Behavior normal.         Thought Content: Thought content normal.         Judgment: Judgment normal.         The history was obtained from the review of the chart, patient.    Lab Results:   Lab Results   Component Value Date/Time    TSH 3RD GENERATON 2.403 06/06/2024 07:15 AM    Free T4 0.93 06/06/2024 07:15 AM       Imaging Studies:   Results for orders placed during the hospital encounter of 11/11/24    US thyroid    Impression  Stable left lobe nodule not meeting criteria for FNA.    Grossly stable nodule posterior to the lower pole of the right thyroid potentially representing a parathyroid adenoma.        Reference: ACR Thyroid Imaging, Reporting and Data System (TI-RADS): White Paper of the ACR TI-RADS Committee. J AM Mark Radiol 2017;14:587-595. Additional recommendations based on American Thyroid Association 2015 guidelines.        Workstation performed: [id number]      Results Review Statement: I reviewed radiology reports from this admission including: Ultrasound(s).    Portions of the record may have been created with voice recognition software. Occasional wrong word or \"sound a like\" substitutions may have occurred due to the inherent limitations of voice recognition " software. Read the chart carefully and recognize, using context, where substitutions have occurred.

## 2025-02-19 ENCOUNTER — ANESTHESIA EVENT (OUTPATIENT)
Dept: PERIOP | Facility: HOSPITAL | Age: 35
End: 2025-02-19
Payer: COMMERCIAL

## 2025-02-19 ENCOUNTER — ANESTHESIA (OUTPATIENT)
Dept: PERIOP | Facility: HOSPITAL | Age: 35
End: 2025-02-19
Payer: COMMERCIAL

## 2025-02-19 ENCOUNTER — HOSPITAL ENCOUNTER (OUTPATIENT)
Facility: HOSPITAL | Age: 35
Setting detail: OUTPATIENT SURGERY
Discharge: HOME | End: 2025-02-20
Attending: OBSTETRICS & GYNECOLOGY | Admitting: OBSTETRICS & GYNECOLOGY
Payer: COMMERCIAL

## 2025-02-19 DIAGNOSIS — R30.0 DYSURIA: ICD-10-CM

## 2025-02-19 DIAGNOSIS — D21.9 FIBROID: ICD-10-CM

## 2025-02-19 DIAGNOSIS — R10.2 PELVIC PAIN IN FEMALE: ICD-10-CM

## 2025-02-19 DIAGNOSIS — G43.809 OTHER MIGRAINE WITHOUT STATUS MIGRAINOSUS, NOT INTRACTABLE: ICD-10-CM

## 2025-02-19 DIAGNOSIS — G89.18 POSTOPERATIVE PAIN: Primary | ICD-10-CM

## 2025-02-19 DIAGNOSIS — N92.0 MENORRHAGIA WITH REGULAR CYCLE: ICD-10-CM

## 2025-02-19 LAB
ABO GROUP BLD: NORMAL
EXT PREGNANCY TEST URINE: NEGATIVE
EXT. CONTROL: NORMAL
RH BLD: POSITIVE

## 2025-02-19 PROCEDURE — NC001 PR NO CHARGE: Performed by: OBSTETRICS & GYNECOLOGY

## 2025-02-19 PROCEDURE — 81025 URINE PREGNANCY TEST: CPT | Performed by: OBSTETRICS & GYNECOLOGY

## 2025-02-19 PROCEDURE — C1765 ADHESION BARRIER: HCPCS | Performed by: OBSTETRICS & GYNECOLOGY

## 2025-02-19 PROCEDURE — C1782 MORCELLATOR: HCPCS | Performed by: OBSTETRICS & GYNECOLOGY

## 2025-02-19 PROCEDURE — 93005 ELECTROCARDIOGRAM TRACING: CPT

## 2025-02-19 PROCEDURE — 88305 TISSUE EXAM BY PATHOLOGIST: CPT | Performed by: SPECIALIST

## 2025-02-19 RX ORDER — HYDROMORPHONE HCL/PF 1 MG/ML
0.2 SYRINGE (ML) INJECTION EVERY 4 HOURS PRN
Status: DISCONTINUED | OUTPATIENT
Start: 2025-02-19 | End: 2025-02-20 | Stop reason: HOSPADM

## 2025-02-19 RX ORDER — SENNOSIDES 8.6 MG
1 TABLET ORAL DAILY PRN
Status: DISCONTINUED | OUTPATIENT
Start: 2025-02-19 | End: 2025-02-20 | Stop reason: HOSPADM

## 2025-02-19 RX ORDER — HYDROMORPHONE HCL/PF 1 MG/ML
0.5 SYRINGE (ML) INJECTION
Refills: 0 | Status: DISCONTINUED | OUTPATIENT
Start: 2025-02-19 | End: 2025-02-19 | Stop reason: HOSPADM

## 2025-02-19 RX ORDER — BUSPIRONE HYDROCHLORIDE 10 MG/1
10 TABLET ORAL 3 TIMES DAILY
Status: DISCONTINUED | OUTPATIENT
Start: 2025-02-19 | End: 2025-02-20 | Stop reason: HOSPADM

## 2025-02-19 RX ORDER — PROPOFOL 10 MG/ML
INJECTION, EMULSION INTRAVENOUS AS NEEDED
Status: DISCONTINUED | OUTPATIENT
Start: 2025-02-19 | End: 2025-02-19

## 2025-02-19 RX ORDER — HYDROMORPHONE HCL/PF 1 MG/ML
SYRINGE (ML) INJECTION AS NEEDED
Status: DISCONTINUED | OUTPATIENT
Start: 2025-02-19 | End: 2025-02-19

## 2025-02-19 RX ORDER — SUMATRIPTAN 50 MG/1
50 TABLET, FILM COATED ORAL ONCE AS NEEDED
Status: DISCONTINUED | OUTPATIENT
Start: 2025-02-19 | End: 2025-02-20 | Stop reason: HOSPADM

## 2025-02-19 RX ORDER — SODIUM CHLORIDE, SODIUM LACTATE, POTASSIUM CHLORIDE, CALCIUM CHLORIDE 600; 310; 30; 20 MG/100ML; MG/100ML; MG/100ML; MG/100ML
125 INJECTION, SOLUTION INTRAVENOUS CONTINUOUS
Status: DISCONTINUED | OUTPATIENT
Start: 2025-02-19 | End: 2025-02-19

## 2025-02-19 RX ORDER — PROMETHAZINE HYDROCHLORIDE 25 MG/ML
12.5 INJECTION, SOLUTION INTRAMUSCULAR; INTRAVENOUS ONCE AS NEEDED
Status: DISCONTINUED | OUTPATIENT
Start: 2025-02-19 | End: 2025-02-19 | Stop reason: HOSPADM

## 2025-02-19 RX ORDER — METHOCARBAMOL 500 MG/1
500 TABLET, FILM COATED ORAL EVERY 6 HOURS PRN
Status: DISCONTINUED | OUTPATIENT
Start: 2025-02-19 | End: 2025-02-20 | Stop reason: HOSPADM

## 2025-02-19 RX ORDER — GABAPENTIN 100 MG/1
200 CAPSULE ORAL ONCE
Status: COMPLETED | OUTPATIENT
Start: 2025-02-19 | End: 2025-02-19

## 2025-02-19 RX ORDER — PROMETHAZINE HYDROCHLORIDE 25 MG/ML
6.25 INJECTION, SOLUTION INTRAMUSCULAR; INTRAVENOUS ONCE
Status: COMPLETED | OUTPATIENT
Start: 2025-02-19 | End: 2025-02-19

## 2025-02-19 RX ORDER — SCOPOLAMINE 1 MG/3D
1 PATCH, EXTENDED RELEASE TRANSDERMAL
Status: DISCONTINUED | OUTPATIENT
Start: 2025-02-19 | End: 2025-02-20 | Stop reason: HOSPADM

## 2025-02-19 RX ORDER — PROPOFOL 10 MG/ML
INJECTION, EMULSION INTRAVENOUS CONTINUOUS PRN
Status: DISCONTINUED | OUTPATIENT
Start: 2025-02-19 | End: 2025-02-19

## 2025-02-19 RX ORDER — PROCHLORPERAZINE MALEATE 5 MG/1
5 TABLET ORAL EVERY 6 HOURS PRN
Status: DISCONTINUED | OUTPATIENT
Start: 2025-02-19 | End: 2025-02-20 | Stop reason: HOSPADM

## 2025-02-19 RX ORDER — ACETAMINOPHEN 325 MG/1
975 TABLET ORAL EVERY 6 HOURS PRN
Status: DISCONTINUED | OUTPATIENT
Start: 2025-02-19 | End: 2025-02-19

## 2025-02-19 RX ORDER — ONDANSETRON 2 MG/ML
INJECTION INTRAMUSCULAR; INTRAVENOUS AS NEEDED
Status: DISCONTINUED | OUTPATIENT
Start: 2025-02-19 | End: 2025-02-19

## 2025-02-19 RX ORDER — LIDOCAINE HYDROCHLORIDE 10 MG/ML
INJECTION, SOLUTION EPIDURAL; INFILTRATION; INTRACAUDAL; PERINEURAL AS NEEDED
Status: DISCONTINUED | OUTPATIENT
Start: 2025-02-19 | End: 2025-02-19

## 2025-02-19 RX ORDER — ACETAMINOPHEN 10 MG/ML
1000 INJECTION, SOLUTION INTRAVENOUS EVERY 6 HOURS PRN
Status: DISCONTINUED | OUTPATIENT
Start: 2025-02-19 | End: 2025-02-20 | Stop reason: HOSPADM

## 2025-02-19 RX ORDER — FENTANYL CITRATE 50 UG/ML
INJECTION, SOLUTION INTRAMUSCULAR; INTRAVENOUS AS NEEDED
Status: DISCONTINUED | OUTPATIENT
Start: 2025-02-19 | End: 2025-02-19

## 2025-02-19 RX ORDER — CALCIUM CARB/VITAMIN D3/VIT K1 500-500-40
15 TABLET,CHEWABLE ORAL DAILY
Status: DISCONTINUED | OUTPATIENT
Start: 2025-02-20 | End: 2025-02-20 | Stop reason: HOSPADM

## 2025-02-19 RX ORDER — DROPERIDOL 2.5 MG/ML
0.62 INJECTION, SOLUTION INTRAMUSCULAR; INTRAVENOUS ONCE
Status: COMPLETED | OUTPATIENT
Start: 2025-02-19 | End: 2025-02-19

## 2025-02-19 RX ORDER — PANTOPRAZOLE SODIUM 40 MG/10ML
40 INJECTION, POWDER, LYOPHILIZED, FOR SOLUTION INTRAVENOUS EVERY 12 HOURS SCHEDULED
Status: DISCONTINUED | OUTPATIENT
Start: 2025-02-19 | End: 2025-02-20 | Stop reason: HOSPADM

## 2025-02-19 RX ORDER — FENTANYL CITRATE/PF 50 MCG/ML
50 SYRINGE (ML) INJECTION
Refills: 0 | Status: DISCONTINUED | OUTPATIENT
Start: 2025-02-19 | End: 2025-02-19 | Stop reason: HOSPADM

## 2025-02-19 RX ORDER — MAGNESIUM HYDROXIDE 1200 MG/15ML
LIQUID ORAL AS NEEDED
Status: DISCONTINUED | OUTPATIENT
Start: 2025-02-19 | End: 2025-02-19 | Stop reason: HOSPADM

## 2025-02-19 RX ORDER — ONDANSETRON 2 MG/ML
4 INJECTION INTRAMUSCULAR; INTRAVENOUS EVERY 6 HOURS PRN
Status: DISCONTINUED | OUTPATIENT
Start: 2025-02-19 | End: 2025-02-20 | Stop reason: HOSPADM

## 2025-02-19 RX ORDER — OXYCODONE HYDROCHLORIDE 5 MG/1
5 TABLET ORAL EVERY 4 HOURS PRN
Qty: 10 TABLET | Refills: 0 | Status: SHIPPED | OUTPATIENT
Start: 2025-02-19 | End: 2025-03-01

## 2025-02-19 RX ORDER — OXYCODONE HYDROCHLORIDE 5 MG/1
5 TABLET ORAL EVERY 4 HOURS PRN
Status: DISCONTINUED | OUTPATIENT
Start: 2025-02-19 | End: 2025-02-19

## 2025-02-19 RX ORDER — ONDANSETRON 4 MG/1
4 TABLET, ORALLY DISINTEGRATING ORAL EVERY 6 HOURS PRN
Status: DISCONTINUED | OUTPATIENT
Start: 2025-02-19 | End: 2025-02-19

## 2025-02-19 RX ORDER — METOCLOPRAMIDE HYDROCHLORIDE 5 MG/ML
10 INJECTION INTRAMUSCULAR; INTRAVENOUS EVERY 6 HOURS PRN
Status: DISCONTINUED | OUTPATIENT
Start: 2025-02-19 | End: 2025-02-20 | Stop reason: HOSPADM

## 2025-02-19 RX ORDER — KETOROLAC TROMETHAMINE 30 MG/ML
15 INJECTION, SOLUTION INTRAMUSCULAR; INTRAVENOUS EVERY 6 HOURS SCHEDULED
Status: COMPLETED | OUTPATIENT
Start: 2025-02-19 | End: 2025-02-20

## 2025-02-19 RX ORDER — ONDANSETRON 2 MG/ML
4 INJECTION INTRAMUSCULAR; INTRAVENOUS ONCE AS NEEDED
Status: DISCONTINUED | OUTPATIENT
Start: 2025-02-19 | End: 2025-02-19 | Stop reason: HOSPADM

## 2025-02-19 RX ORDER — IBUPROFEN 800 MG/1
800 TABLET, FILM COATED ORAL EVERY 8 HOURS PRN
Qty: 30 TABLET | Refills: 1 | Status: SHIPPED | OUTPATIENT
Start: 2025-02-19

## 2025-02-19 RX ORDER — SODIUM CHLORIDE, SODIUM LACTATE, POTASSIUM CHLORIDE, CALCIUM CHLORIDE 600; 310; 30; 20 MG/100ML; MG/100ML; MG/100ML; MG/100ML
80 INJECTION, SOLUTION INTRAVENOUS CONTINUOUS
Status: DISCONTINUED | OUTPATIENT
Start: 2025-02-19 | End: 2025-02-20 | Stop reason: HOSPADM

## 2025-02-19 RX ORDER — ROCURONIUM BROMIDE 10 MG/ML
INJECTION, SOLUTION INTRAVENOUS AS NEEDED
Status: DISCONTINUED | OUTPATIENT
Start: 2025-02-19 | End: 2025-02-19

## 2025-02-19 RX ORDER — ACETAMINOPHEN 325 MG/1
975 TABLET ORAL ONCE
Status: COMPLETED | OUTPATIENT
Start: 2025-02-19 | End: 2025-02-19

## 2025-02-19 RX ORDER — LIDOCAINE HYDROCHLORIDE AND EPINEPHRINE 10; 10 MG/ML; UG/ML
INJECTION, SOLUTION INFILTRATION; PERINEURAL AS NEEDED
Status: DISCONTINUED | OUTPATIENT
Start: 2025-02-19 | End: 2025-02-19 | Stop reason: HOSPADM

## 2025-02-19 RX ORDER — CEFAZOLIN SODIUM 2 G/50ML
2000 SOLUTION INTRAVENOUS ONCE
Status: COMPLETED | OUTPATIENT
Start: 2025-02-19 | End: 2025-02-19

## 2025-02-19 RX ORDER — SERTRALINE HYDROCHLORIDE 25 MG/1
25 TABLET, FILM COATED ORAL DAILY
Status: DISCONTINUED | OUTPATIENT
Start: 2025-02-20 | End: 2025-02-20 | Stop reason: HOSPADM

## 2025-02-19 RX ORDER — ONDANSETRON 2 MG/ML
4 INJECTION INTRAMUSCULAR; INTRAVENOUS EVERY 6 HOURS PRN
Status: DISCONTINUED | OUTPATIENT
Start: 2025-02-19 | End: 2025-02-19

## 2025-02-19 RX ORDER — PHENAZOPYRIDINE HYDROCHLORIDE 100 MG/1
100 TABLET, FILM COATED ORAL 3 TIMES DAILY PRN
Status: DISCONTINUED | OUTPATIENT
Start: 2025-02-19 | End: 2025-02-20 | Stop reason: HOSPADM

## 2025-02-19 RX ORDER — KETOROLAC TROMETHAMINE 30 MG/ML
INJECTION, SOLUTION INTRAMUSCULAR; INTRAVENOUS AS NEEDED
Status: DISCONTINUED | OUTPATIENT
Start: 2025-02-19 | End: 2025-02-19

## 2025-02-19 RX ORDER — IBUPROFEN 600 MG/1
600 TABLET, FILM COATED ORAL EVERY 6 HOURS PRN
Status: DISCONTINUED | OUTPATIENT
Start: 2025-02-19 | End: 2025-02-19

## 2025-02-19 RX ORDER — DOCUSATE SODIUM 100 MG/1
100 CAPSULE, LIQUID FILLED ORAL 2 TIMES DAILY
Status: DISCONTINUED | OUTPATIENT
Start: 2025-02-19 | End: 2025-02-19

## 2025-02-19 RX ORDER — PANTOPRAZOLE SODIUM 20 MG/1
20 TABLET, DELAYED RELEASE ORAL DAILY PRN
Status: DISCONTINUED | OUTPATIENT
Start: 2025-02-19 | End: 2025-02-19

## 2025-02-19 RX ORDER — DEXAMETHASONE SODIUM PHOSPHATE 10 MG/ML
INJECTION, SOLUTION INTRAMUSCULAR; INTRAVENOUS AS NEEDED
Status: DISCONTINUED | OUTPATIENT
Start: 2025-02-19 | End: 2025-02-19

## 2025-02-19 RX ORDER — ACETAMINOPHEN 10 MG/ML
1000 INJECTION, SOLUTION INTRAVENOUS EVERY 6 HOURS SCHEDULED
Status: DISCONTINUED | OUTPATIENT
Start: 2025-02-20 | End: 2025-02-20 | Stop reason: HOSPADM

## 2025-02-19 RX ORDER — MIDAZOLAM HYDROCHLORIDE 2 MG/2ML
INJECTION, SOLUTION INTRAMUSCULAR; INTRAVENOUS AS NEEDED
Status: DISCONTINUED | OUTPATIENT
Start: 2025-02-19 | End: 2025-02-19

## 2025-02-19 RX ADMIN — ONDANSETRON 4 MG: 2 INJECTION INTRAMUSCULAR; INTRAVENOUS at 23:52

## 2025-02-19 RX ADMIN — DEXAMETHASONE SODIUM PHOSPHATE 10 MG: 10 INJECTION INTRAMUSCULAR; INTRAVENOUS at 07:37

## 2025-02-19 RX ADMIN — KETOROLAC TROMETHAMINE 30 MG: 30 INJECTION, SOLUTION INTRAMUSCULAR; INTRAVENOUS at 11:49

## 2025-02-19 RX ADMIN — PROMETHAZINE HYDROCHLORIDE 6.25 MG: 25 INJECTION INTRAMUSCULAR; INTRAVENOUS at 17:13

## 2025-02-19 RX ADMIN — ONDANSETRON 4 MG: 2 INJECTION INTRAMUSCULAR; INTRAVENOUS at 07:37

## 2025-02-19 RX ADMIN — ACETAMINOPHEN 975 MG: 325 TABLET, FILM COATED ORAL at 06:49

## 2025-02-19 RX ADMIN — ACETAMINOPHEN 1000 MG: 10 INJECTION INTRAVENOUS at 18:32

## 2025-02-19 RX ADMIN — GABAPENTIN 200 MG: 100 CAPSULE ORAL at 06:49

## 2025-02-19 RX ADMIN — METHOCARBAMOL 500 MG: 500 TABLET ORAL at 16:08

## 2025-02-19 RX ADMIN — FENTANYL CITRATE 100 MCG: 50 INJECTION INTRAMUSCULAR; INTRAVENOUS at 07:35

## 2025-02-19 RX ADMIN — PROPOFOL 110 MCG/KG/MIN: 10 INJECTION, EMULSION INTRAVENOUS at 07:37

## 2025-02-19 RX ADMIN — CEFAZOLIN SODIUM 2000 MG: 2 SOLUTION INTRAVENOUS at 11:27

## 2025-02-19 RX ADMIN — SUGAMMADEX 200 MG: 100 INJECTION, SOLUTION INTRAVENOUS at 11:34

## 2025-02-19 RX ADMIN — HYDROMORPHONE HYDROCHLORIDE 0.5 MG: 1 INJECTION, SOLUTION INTRAMUSCULAR; INTRAVENOUS; SUBCUTANEOUS at 08:10

## 2025-02-19 RX ADMIN — LIDOCAINE HYDROCHLORIDE 50 MG: 10 INJECTION, SOLUTION EPIDURAL; INFILTRATION; INTRACAUDAL at 07:37

## 2025-02-19 RX ADMIN — ROCURONIUM 50 MG: 50 INJECTION, SOLUTION INTRAVENOUS at 07:37

## 2025-02-19 RX ADMIN — MIDAZOLAM 2 MG: 1 INJECTION INTRAMUSCULAR; INTRAVENOUS at 07:29

## 2025-02-19 RX ADMIN — TRIMETHOBENZAMIDE HYDROCHLORIDE 200 MG: 100 INJECTION INTRAMUSCULAR at 19:23

## 2025-02-19 RX ADMIN — KETOROLAC TROMETHAMINE 15 MG: 30 INJECTION, SOLUTION INTRAMUSCULAR; INTRAVENOUS at 20:37

## 2025-02-19 RX ADMIN — SCOPOLAMINE 1 PATCH: 1.5 PATCH, EXTENDED RELEASE TRANSDERMAL at 15:50

## 2025-02-19 RX ADMIN — ACETAMINOPHEN 975 MG: 325 TABLET, FILM COATED ORAL at 16:08

## 2025-02-19 RX ADMIN — OXYCODONE HYDROCHLORIDE 5 MG: 5 TABLET ORAL at 13:51

## 2025-02-19 RX ADMIN — DEXMEDETOMIDINE 8 MCG: 100 INJECTION, SOLUTION INTRAVENOUS at 11:27

## 2025-02-19 RX ADMIN — HYDROMORPHONE HYDROCHLORIDE 0.5 MG: 1 INJECTION, SOLUTION INTRAMUSCULAR; INTRAVENOUS; SUBCUTANEOUS at 08:56

## 2025-02-19 RX ADMIN — SODIUM CHLORIDE, SODIUM LACTATE, POTASSIUM CHLORIDE, AND CALCIUM CHLORIDE: .6; .31; .03; .02 INJECTION, SOLUTION INTRAVENOUS at 08:08

## 2025-02-19 RX ADMIN — DEXMEDETOMIDINE 12 MCG: 100 INJECTION, SOLUTION INTRAVENOUS at 10:34

## 2025-02-19 RX ADMIN — METOCLOPRAMIDE 10 MG: 5 INJECTION, SOLUTION INTRAMUSCULAR; INTRAVENOUS at 18:31

## 2025-02-19 RX ADMIN — ACETAMINOPHEN 1000 MG: 1000 INJECTION, SOLUTION INTRAVENOUS at 23:49

## 2025-02-19 RX ADMIN — ONDANSETRON 4 MG: 4 TABLET, ORALLY DISINTEGRATING ORAL at 15:50

## 2025-02-19 RX ADMIN — PROPOFOL 250 MG: 10 INJECTION, EMULSION INTRAVENOUS at 07:37

## 2025-02-19 RX ADMIN — ROCURONIUM 20 MG: 50 INJECTION, SOLUTION INTRAVENOUS at 08:56

## 2025-02-19 RX ADMIN — SODIUM CHLORIDE, SODIUM LACTATE, POTASSIUM CHLORIDE, AND CALCIUM CHLORIDE: .6; .31; .03; .02 INJECTION, SOLUTION INTRAVENOUS at 06:53

## 2025-02-19 RX ADMIN — CEFAZOLIN SODIUM 2000 MG: 2 SOLUTION INTRAVENOUS at 07:28

## 2025-02-19 RX ADMIN — IBUPROFEN 600 MG: 600 TABLET, FILM COATED ORAL at 14:29

## 2025-02-19 RX ADMIN — DROPERIDOL 0.62 MG: 2.5 INJECTION, SOLUTION INTRAMUSCULAR; INTRAVENOUS at 22:19

## 2025-02-19 RX ADMIN — DEXMEDETOMIDINE 12 MCG: 100 INJECTION, SOLUTION INTRAVENOUS at 11:40

## 2025-02-19 NOTE — DISCHARGE INSTR - AVS FIRST PAGE
Post-Gynecologic Surgery Discharge Instructions:  1. No heavy lifting more than one full gallon of milk (about 8 lbs) for 1 week.  2. Nothing in the vagina for 2 weeks  3. You may take stairs one at a time, touching each step with both feet for the first few days, then as tolerated.  4. Call the office for fever greater than 100.4'F, heavy vaginal bleeding, or increasing pain.  5. Activity as tolerated.  6. Avoid driving if taking narcotic pain medications     Postoperative Opioid Use  You may have been prescribed an opioid pain medication to assist with your postoperative pain control. Our goal is for your pain to be controlled, not for you to be completely pain free. Being pain free after surgery takes time.   While this medication is excellent at treating postoperative pain, it has several side effects and addictive properties. Here is a suggested plan to minimize opiate use:    Use non-opiate methods of pain control first  Use ice packs over your incision to reduce pain and swelling. This will help minimize the need for medications.   For the first few days after your surgery, take 650 mg of Acetaminophen (Tylenol) every 6 hours regularly. In addition, take 600 mg of Ibuprofen (Motrin) every 6 hours regularly. Using these medications will help keep you from getting into severe pain and can reduce how much narcotic pain medication you need even if they are not enough to control your pain.     Use opiate medication when the other methods are not adequate  For the first few days after surgery, you may need additional pain relief   You may take your opiate medication every 4-6 hours.   This is the first medication you should stop taking as your pain improves.    After the first few days, you should not require opiate medications  You may continue to take 650 mg of Acetaminophen (Tylenol) every 6 hours regularly.   You may take Ibuprofen (Motrin) 600 mg as needed for additional pain relief.   If you have unused  tablets of the opiate medication, see below  Do not take more than 4,000 mg of Acetaminophen (Tylenol) in a 24 hour period. Please ask your doctor for guidance on amount of Acetaminophen (Tylenol) it is safe to take if you have known liver disease. If you have a sensitive stomach, please take Ibuprofen (Motrin) with food. Please ask your doctor for guidance on safety of Ibuprofen (Motrin) if you have known kidney disease or if you have a history of weight loss surgery.    Side effects of opioids  Do not drive or operate heavy machinery while using opioid pain medications   A few days of postoperative opioid use is safe while breastfeeding   Opioids can cause constipation. Please be sure to drink lots of water, and your doctor may recommend use of a stool softener, such as docusate sodium (Colace) 100 mg twice a day or Senna 8.6 mg every night.    Disposing of your unused pills  It is very likely that you will not need all of the pills you have been prescribed. They should not be used to treat other pain. They should not be shared with other people. They should not be saved. It is not safe to take  medications. It is not safe to keep them around the house. It is not safe to keep them within reach of children or pets. You should dispose of unused pills by taking them to a Controlled Substance Disposal Site.    Controlled Substance Disposal Sites   The following locations are controlled substance disposal sites. You may walk in to any location with unused pills and dispose of them safely and anonymously.    Pharmacies that accept unused pills  [facility]  [address]   [address], [place]   [address], [place]   [address], [place]   [address], [place]   [address], [place]  [facility]  [address] [place] [address] ([address]), [facility]   [address], [place]  Please call your  local pharmacy to see if they also have a Controlled Substance Disposal location and are not on this list.    Police departments may accept unused pills  Most local police departments have a drop box on their premises. Please contact your local police department for more information. Information can also be found here: https://safe.pharmacy/drug-disposal/ [safe.pharmacy]    National Prescription Drug Take Back Day  There is a biannual National Prescription Drug Take Back Day, usually in April and October, when there are more sites available than usual, including most [facility] locations. Information for that can be found here: https://takebackday.[place].gov/ [takebackday.[place].gov]  You can find more information about prescription disposal here:   https://safe.pharmacy/drug-disposal/ [safe.pharmacy]   https://www.ddap.[place].gov/Prevention/Pages/Drug_Take_Back.aspx [ddap.[place].gov]    Thank you

## 2025-02-19 NOTE — H&P
Name: Shruthi Alcantar      : 1990      MRN: [id number]  Encounter Provider: No name on file  Encounter Date: 1/15/2025   Encounter department: [facility] OPERATING ROOM  :  Assessment & Plan    Intramural and subserous leiomyoma of uterus  Fibroid tumor treatments/plan of care reviewed.   Observation, use of medication to mitigate symptoms, removal, ablation of fibroids.   3 fibroids are subserosal, last is intramural ( smallest)  Plan for Robotic assisted laparoscopic myomectomy with exam under anesthesia    We reviewed the risks of the surgery including but not limited to infection, bleeding, injury to nearby organs (bowel. bladder, ureter, blood vessel, nerve) and possible long term consequences of such injury, as well as possibility of oopherectomy, conversion to laparotomy, need for blood transfusion and other resuscitative measures.          Problem      Intramural and Subserous Leiomyoma of Uterus    The uterus is anteverted in position, measuring 13.8 x 8.6 x 6.4 cm.  Multilobulated uterine contour.  6.7 x 4.8 x 6.2 cm posterior body subserosal fibroid, volume 103.4 mL previously 55.6 mL.  5.1 x 4.4 x 5.2 cm right right lower uterine segment subserosal fibroid, volume 61.4 mL previously 31.8 mL.  6.3 x 4.6 x 7.4 cm fundal subserosal fibroid, volume 112.5 mL previously 54.2 mL.  2.9 x 1.7 x 2.2 cm left lateral lower uterine body intramural fibroid, volume 5.5 mL previously 1.7 mL.            Menorrhagia with regular cycle  Treatment options for menorrhagia discussed:    Observation with treatment of anemia, optimize nutrition  Medications: hormones ( pill, IUD, injection, ring, patch, implant, combined and progestin only) and TXA  Surgery: D&C, Hysteroscopy, Ablation, Hysterectomy, myonmectomy         Pelvic pain  Size of fibroids is most likely cause of pelvic pain. Do need to keep in differential endometriosis, non gyn causes : musculoskeletal, urologic, GI.  After fibroid  "removal and recovery can readdress if pelvic pain persists.          History of Present Illness   HPI  Shruthi Alcantar is a 35 y.o. female who presents for fibroid consult. Pelvic US 1/8/25. Fibroids are most bothersome during the time of her cycle. Heavy flow and typically crampy. Regular cycles.   History obtained from: patient    Review of Systems   Constitutional:  Negative for activity change, appetite change, chills, fatigue and fever.   HENT:  Negative for rhinorrhea, sneezing and sore throat.    Eyes:  Negative for visual disturbance.   Respiratory:  Negative for cough, shortness of breath and wheezing.    Cardiovascular:  Negative for chest pain, palpitations and leg swelling.   Gastrointestinal:  Negative for abdominal distention, constipation, diarrhea, nausea and vomiting.   Genitourinary:  Negative for difficulty urinating.   Neurological:  Negative for syncope and light-headedness.          Objective   /66   Pulse 83   Temp 98.2 °F (36.8 °C) (Temporal)   Resp 18   Ht 5' 6\" (1.676 m)   Wt 79.8 kg (176 lb)   LMP 02/03/2025 (Exact Date)   SpO2 99%   Breastfeeding No   BMI 28.41 kg/m²      Physical Exam  Constitutional:       General: She is not in acute distress.     Appearance: She is well-developed. She is not diaphoretic.   Neck:      Vascular: No JVD.   Cardiovascular:      Rate and Rhythm: Normal rate and regular rhythm.      Heart sounds: Normal heart sounds. No murmur heard.     No friction rub. No gallop.   Pulmonary:      Effort: Pulmonary effort is normal. No respiratory distress.      Breath sounds: Normal breath sounds.   Abdominal:      General: Bowel sounds are normal. There is no distension.      Palpations: Abdomen is soft.      Tenderness: There is no abdominal tenderness. There is no guarding or rebound.      Comments: Uterus palpated midline, 18 week size mobile   Genitourinary:     Labia:         Right: No rash, tenderness or lesion.         Left: No rash, tenderness " or lesion.       Vagina: Normal. No vaginal discharge, erythema, tenderness or bleeding.      Cervix: No cervical motion tenderness, discharge, friability or erythema.      Uterus: Enlarged (bulky, mobile). Not deviated, not fixed and not tender.       Adnexa:         Right: No mass, tenderness or fullness.          Left: No mass, tenderness or fullness.     Musculoskeletal:      Cervical back: Neck supple.      Right lower leg: No edema.      Left lower leg: No edema.   Lymphadenopathy:      Cervical: No cervical adenopathy.   Neurological:      Mental Status: She is alert and oriented to person, place, and time.      Deep Tendon Reflexes: Reflexes are normal and symmetric.         Administrative Statements   I have spent a total time of 45 minutes in caring for this patient on the day of the visit/encounter including Diagnostic results, Prognosis, Risks and benefits of tx options, Instructions for management, Risk factor reductions, Impressions, Counseling / Coordination of care, Documenting in the medical record, Reviewing / ordering tests, medicine, procedures  , Obtaining or reviewing history  , and Communicating with other healthcare professionals .

## 2025-02-19 NOTE — INTERVAL H&P NOTE
H&P reviewed. After examining the patient I find no changes in the patients condition since the H&P had been written.    Vitals:    02/19/25 0641   BP: 114/66   Pulse: 83   Resp: 18   Temp: 98.2 °F (36.8 °C)   SpO2: 99%

## 2025-02-19 NOTE — ANESTHESIA POSTPROCEDURE EVALUATION
Post-Op Assessment Note    CV Status:  Stable  Pain Score: 0    Pain management: adequate       Mental Status:  Sleepy   Hydration Status:  Euvolemic   PONV Controlled:  Controlled   Airway Patency:  Patent     Post Op Vitals Reviewed: Yes    No anethesia notable event occurred.    Staff: CRNA   Comments: vss sv nonobstructed uneventful          Last Filed PACU Vitals:  Vitals Value Taken Time   Temp 98.8    Pulse 100    /66    Resp 22    SpO2 98

## 2025-02-19 NOTE — QUICK NOTE
"Obstetrics & Gynecology Progress Note  Shruthi LOGAN [facility] 35 y.o. female MRN: [id number]  Unit/Bed#: OR POOL Encounter: [id number]    Chief complaint: Nausea/vomiting    SUBJECTIVE:    Erna reports persistent nausea/vomiting despite having received phenergan and Zofran in PACU. Voiding status: arnett removed and pending void trial. Flatus: denies. Bowel  movement: denies .     OBJECTIVE:  Vitals:   /66   Pulse 83   Temp 98.4 °F (36.9 °C) (Temporal)   Resp 17   Ht 5' 6\" (1.676 m)   Wt 79.8 kg (176 lb)   LMP 02/03/2025 (Exact Date)   SpO2 99%   Breastfeeding No   BMI 28.41 kg/m²       Intake/Output Summary (Last 24 hours) at 2/19/2025 1846  Last data filed at 2/19/2025 1553  Gross per 24 hour   Intake 2930 ml   Output 800 ml   Net 2130 ml       Invasive Devices       Peripheral Intravenous Line  Duration             Peripheral IV 02/19/25 Distal;Right;Upper;Ventral (anterior) Arm <1 day                    Physical Exam:   GEN: appears well, alert and oriented x 3, pleasant and cooperative   CARDIAC: normal rate   PULMONARY: normal effort  ABDOMEN: deferred per patient request due to nausea/vomiting at time of evaluation  EXTREMITIES: SCDs on and pumping    ASSESSMENT/PLAN:  Postop Day #0 s/p RA-Lap Myomectomy, hemodynamically stable with persistent nausea/vomiting  -Intractable Nausea/vomiting in the post-op period: likely secondary to anesthesia: s/p phenergan/ Zofran. Plan for Reglan and Tigan. If symptoms persist plan to admit her overnight for observation  Out of bed/ ambulation as tolerated   -Advance to regular diet as tolerated   -Pain control : not tolerating po at this time, one time  IV tylenol ordered  - F/U Void trial       Anticipate discharge tonight or tomorrow AM if nausea persists      Discussed with dr. Ladarius Wei.    Teresa Contreras MD  PGY-III, OB/GYN  2/19/2025, 6:46 PM               "

## 2025-02-19 NOTE — OP NOTE
OPERATIVE REPORT  [facility]    :  1990  MRN: [id number]  Pt Location: AN OR ROOM 04    SURGERY DATE: 2025    Surgeons and Role:     * Bisi Gomez MD - Primary     * Karla Torres PA-C - Assisting     * Allie Schilling MD - Fellow    Preop Diagnosis:  Fibroid [D21.9]  Menorrhagia with regular cycle [N92.0]  Pelvic pain in female [R10.2]    Post-Op Diagnosis Codes:     * Fibroid [D21.9]     * Menorrhagia with regular cycle [N92.0]     * Pelvic pain in female [R10.2]    Procedure(s):  Bilateral - ROBOTIC ASSISTED LAPAROSCOPIC MYOMECTOMY. EXAM UNDER ANESTHESIA    Specimen(s):  ID Type Source Tests Collected by Time Destination   1 : fibroids Tissue Myomectomy w/o Uterus TISSUE EXAM Bisi Gomez MD 2025 1052        Estimated Blood Loss:   50 mL    Drains:  [REMOVED] Urethral Catheter Non-latex 16 Fr. (Removed)   Number of days: 0       Anesthesia Type:   General    Operative Indications:  Fibroid [D21.9]  Menorrhagia with regular cycle [N92.0]  Pelvic pain in female [R10.2]    Operative Findings:  Normal appearing external female genitalia  Normal appearing vaginal mucosa  Small, nulliparous, grossly normal appearing cervix  Bulky, anteverted mobile uterus, approximately 15 weeks in size. Palpable large fundal fibroid    On laparoscopic evaluation:   Normal abdominal survey without evidence of injury or gross pathology   Large fibroid uterus (pictures in media of patient's chart):  - 6 cm pedunculated anterior/fundal fibroid  - 6 cm pedunculated posterior fibroid  - 5 cm right anterolateral corpus fibroid with extension into broad ligament  - 3 cm left lower lateral posterior body fibroid  - 1 cm posterior corpus fibroid  Total fibroid weight: 297 grams  Fibrillar placed in the exposed parametria inferior to the peritoneum following removal of the right anterolateral corpus fibroid   Grossly normal appearing bilateral fallopian tubes and ovaries. Right ovary  with small ovulatory cyst  Ureteral vermiculation appreciated bilaterally      Complications:   None apparent    Procedure and Technique:  Patient was identified in the holding area and brought back to the operating room where general anesthesia was initiated. Bilateral lower extremity compression boots were placed prior to initiation of anesthesia. Patient was placed in the dorsal lithotomy position and prepped and draped in a sterile fashion.  Timeout procedure was completed.  Exam under anesthesia was completed and above findings noted.    A speculum and aren were placed into the vagina for visualization of the cervix.  A single toothed tenaculum was used to grasp the anterior lip of the cervix.  The uterus was sounded to 10 cm and the cervix was dilated to accommodate the insertion of the uterine manipulator. Bilateral stay sutures were placed laterally on the cervix and attached to the JULIO C manipulator after insertion.  The vaginal occluder was inflated with 60mL of fluid. Jones catheter was inserted into the bladder.    Attention was then turned to the abdomen. Supraumbilical 5mm incision was made and with laparoscopic visualization, the port was placed without difficulty.  Two 8mm ports were placed on the patient's left side under direct laparoscopic visualization.  Then two more ports were placed on the right one 8mm and one 12mm for the assistant port.  The initial 5mm supraumbilical incision was extended to and the 12 mm camera port was inserted.    The robot was docked without difficulty with verification of proper arm placement.    The anatomy was inspected. The above findings were noted.  A portion of Vasopression 20u in 100mL saline was injected into each fibroid with a spinal needle through the anterior abdominal wall.     Attention was turned first to the 6 cm fundal fibroid. The fibroid was incised using the monopolar scissors to expose the fibroid capsule. The fibroid was dissected away with  monopolar cautery, traction with the laparoscopic tenaculum and bipolar cautery. One the fibroid was excised, the myometrium was inspected.  Bipoler cautery was used for hemostasis. The fibroid was placed in the RUQ. Five fibroids in total, as described above, were removed in similar fashion. As good hemostasis was achieved with each myomectomy, decision was made to close the uterus once all fibroids were removed.     Attention was first turned to closure of right anterolateral corpus defect. As the fibroid was primarily located in the broad ligament, minimal uterine defect was present. However, there was a large defect in the peritoneum. Fibrillar was placed into the defect over the parametria for additional hemostasis. The peritoneum was closed with 2-0 Monocryl Stratafix running towards the uterus. Uterine serosa was also closed with this same suture.     The fundal and posterior defects aligned so they could be a closed with one running 2-0 Monocryl Stratafix suture in two layers with good hemostasis. The defect from removal of the smaller lateral posterior body fibroid was small and hemostatic through the serosa and did not require closure with suture.     The pelvis was irrigated. Good hemostasis was verified.     A specimen bag was then introduced through the most lateral right port site. All five fibroids were located and placed in the bag without difficulty. The robotic instruments were removed.  The robot was undocked and the robotic arms were then moved away from the patient.     The left lower quadrant trocar was replaced with the Marty power morcellator. The morcellator was introduced through the same incision site after the trocar was removed under direct visualization. The blade was activated after a laparoscopic tenaculum was brought through the device and the specimen was placed into the endocatch bag. Once in the bag, the specimen was grasped with the tenaculum and the morcellator was activated to  begin specimen morcellation. The entire morcellation process occurred under direct visualization and remained contained in the specimen bag. Two fibroids were removed via morcellation. However, the specimen bag folded in on itself, impairing the ability to continue to safely morcellate under direct visualization. The morcellator was placed on safe mode and removed from the abdomen. The LLQ port site was extended to 4 cm for mini-laparotomy and the specimen bag was brought through the port for hand morcellation.     Specimen was grasped with a Kocher clamp and morcellated with a scalpel. Specimen remained contained within the bag during morcellation. Once entire specimen was removed, it was sent for routine pathology evaluation. The bag was removed from the abdomen and remained intact. The LLQ port site was occluded and pneumoperitoneum was then re-established.     The pelvis was again inspected. Hemostasis noted. Intercede was placed over the uterine incisions as an adhesive barrier.      The fascia of RLQ 12 mm port site was closed with 0-Vicryl with aid of Terry-Otilio device under direct visualization. The gas was allowed to escape and all other ports were removed.   The fascia of the mini-lap was grasped with a Kocher clamp closed with 0-Vicryl.     All skin incisions were then closed with 4-0 Monocryl and Exofin.      All instruments were removed from the vagina. Jones catheter was removed. A small laceration was noted at the introitus which was closed with a single interrupted suture of 0-Vicryl.  The patient was extubated and brought to the recovery room in stable condition. All sponge, lap and needle counts were correct. Dr. Gomez was present for the entirety of the procedure.    A qualified resident physician was not available.    Patient Disposition:  PACU  and hemodynamically stable             SIGNATURE: Allie Schilling MD  DATE: February 19, 2025  TIME: 12:48 PM

## 2025-02-19 NOTE — ANESTHESIA PREPROCEDURE EVALUATION
Procedure:  ROBOTIC ASSISTED LAPAROSCOPIC MYOMECTOMY, EXAM UNDER ANESTHESIA (Bilateral: Abdomen)    Relevant Problems   CARDIO   (+) Migraines      NEURO/PSYCH   (+) Anxiety   (+) Migraines     Anxiety   Bell's palsy   Generalized abdominal pain   Hashimoto's thyroiditis   Low serum alkaline phosphatase   Palpitations   Thyroid nodule   Viral infection, unspecified   Vitamin D deficiency        Physical Exam    Airway    Mallampati score: II  TM Distance: >3 FB  Neck ROM: full     Dental       Cardiovascular  Cardiovascular exam normal    Pulmonary  Pulmonary exam normal     Other Findings  post-pubertal.      Anesthesia Plan  ASA Score- 2     Anesthesia Type- general with ASA Monitors.         Additional Monitors:     Airway Plan: ETT.           Plan Factors-Exercise tolerance (METS): >4 METS.    Chart reviewed. EKG reviewed.  Existing labs reviewed. Patient summary reviewed.    Patient is not a current smoker.              Induction- intravenous.    Postoperative Plan- Plan for postoperative opioid use. Planned trial extubation    Perioperative Resuscitation Plan - Level 1 - Full Code.       Informed Consent- Anesthetic plan and risks discussed with patient.  I personally reviewed this patient with the CRNA. Discussed and agreed on the Anesthesia Plan with the CRNA..

## 2025-02-20 VITALS
OXYGEN SATURATION: 97 % | DIASTOLIC BLOOD PRESSURE: 58 MMHG | HEART RATE: 73 BPM | SYSTOLIC BLOOD PRESSURE: 99 MMHG | BODY MASS INDEX: 28.28 KG/M2 | TEMPERATURE: 98.6 F | WEIGHT: 176 LBS | RESPIRATION RATE: 16 BRPM | HEIGHT: 66 IN

## 2025-02-20 PROBLEM — Z98.890 POSTOPERATIVE NAUSEA AND VOMITING: Status: ACTIVE | Noted: 2025-02-20

## 2025-02-20 PROBLEM — R11.2 POSTOPERATIVE NAUSEA AND VOMITING: Status: ACTIVE | Noted: 2025-02-20

## 2025-02-20 LAB
ALBUMIN SERPL BCG-MCNC: 3.6 G/DL (ref 3.5–5)
ALP SERPL-CCNC: 35 U/L (ref 34–104)
ALT SERPL W P-5'-P-CCNC: 11 U/L (ref 7–52)
ANION GAP SERPL CALCULATED.3IONS-SCNC: 8 MMOL/L (ref 4–13)
AST SERPL W P-5'-P-CCNC: 20 U/L (ref 13–39)
ATRIAL RATE: 68 BPM
ATRIAL RATE: 80 BPM
BILIRUB SERPL-MCNC: 0.58 MG/DL (ref 0.2–1)
BUN SERPL-MCNC: 6 MG/DL (ref 5–25)
CALCIUM SERPL-MCNC: 8.4 MG/DL (ref 8.4–10.2)
CHLORIDE SERPL-SCNC: 105 MMOL/L (ref 96–108)
CO2 SERPL-SCNC: 23 MMOL/L (ref 21–32)
CREAT SERPL-MCNC: 0.56 MG/DL (ref 0.6–1.3)
ERYTHROCYTE [DISTWIDTH] IN BLOOD BY AUTOMATED COUNT: 12.8 % (ref 11.6–15.1)
GFR SERPL CREATININE-BSD FRML MDRD: 120 ML/MIN/1.73SQ M
GLUCOSE P FAST SERPL-MCNC: 107 MG/DL (ref 65–99)
GLUCOSE SERPL-MCNC: 107 MG/DL (ref 65–140)
HCT VFR BLD AUTO: 32.2 % (ref 34.8–46.1)
HGB BLD-MCNC: 10.9 G/DL (ref 11.5–15.4)
MAGNESIUM SERPL-MCNC: 1.8 MG/DL (ref 1.9–2.7)
MCH RBC QN AUTO: 28.7 PG (ref 26.8–34.3)
MCHC RBC AUTO-ENTMCNC: 33.9 G/DL (ref 31.4–37.4)
MCV RBC AUTO: 85 FL (ref 82–98)
P AXIS: 18 DEGREES
P AXIS: 21 DEGREES
PLATELET # BLD AUTO: 224 THOUSANDS/UL (ref 149–390)
PMV BLD AUTO: 10.2 FL (ref 8.9–12.7)
POTASSIUM SERPL-SCNC: 3.6 MMOL/L (ref 3.5–5.3)
PR INTERVAL: 130 MS
PR INTERVAL: 130 MS
PROT SERPL-MCNC: 6 G/DL (ref 6.4–8.4)
QRS AXIS: 70 DEGREES
QRS AXIS: 71 DEGREES
QRSD INTERVAL: 86 MS
QRSD INTERVAL: 88 MS
QT INTERVAL: 368 MS
QT INTERVAL: 368 MS
QTC INTERVAL: 392 MS
QTC INTERVAL: 425 MS
RBC # BLD AUTO: 3.8 MILLION/UL (ref 3.81–5.12)
SODIUM SERPL-SCNC: 136 MMOL/L (ref 135–147)
T WAVE AXIS: 50 DEGREES
T WAVE AXIS: 51 DEGREES
VENTRICULAR RATE: 68 BPM
VENTRICULAR RATE: 80 BPM
WBC # BLD AUTO: 14.43 THOUSAND/UL (ref 4.31–10.16)

## 2025-02-20 PROCEDURE — 93010 ELECTROCARDIOGRAM REPORT: CPT | Performed by: INTERNAL MEDICINE

## 2025-02-20 PROCEDURE — 80053 COMPREHEN METABOLIC PANEL: CPT

## 2025-02-20 PROCEDURE — 85027 COMPLETE CBC AUTOMATED: CPT

## 2025-02-20 PROCEDURE — 83735 ASSAY OF MAGNESIUM: CPT

## 2025-02-20 RX ORDER — PHENAZOPYRIDINE HYDROCHLORIDE 100 MG/1
100 TABLET, FILM COATED ORAL 3 TIMES DAILY PRN
Qty: 10 TABLET | Refills: 0 | Status: SHIPPED | OUTPATIENT
Start: 2025-02-20

## 2025-02-20 RX ORDER — METHOCARBAMOL 500 MG/1
500 TABLET, FILM COATED ORAL 4 TIMES DAILY
Qty: 30 TABLET | Refills: 0 | Status: SHIPPED | OUTPATIENT
Start: 2025-02-20

## 2025-02-20 RX ADMIN — ACETAMINOPHEN 1000 MG: 1000 INJECTION, SOLUTION INTRAVENOUS at 06:09

## 2025-02-20 RX ADMIN — KETOROLAC TROMETHAMINE 15 MG: 30 INJECTION, SOLUTION INTRAMUSCULAR; INTRAVENOUS at 09:29

## 2025-02-20 RX ADMIN — ONDANSETRON 4 MG: 2 INJECTION INTRAMUSCULAR; INTRAVENOUS at 06:09

## 2025-02-20 RX ADMIN — METOCLOPRAMIDE 10 MG: 5 INJECTION, SOLUTION INTRAMUSCULAR; INTRAVENOUS at 03:10

## 2025-02-20 RX ADMIN — KETOROLAC TROMETHAMINE 15 MG: 30 INJECTION, SOLUTION INTRAMUSCULAR; INTRAVENOUS at 03:05

## 2025-02-20 NOTE — ANESTHESIA POSTPROCEDURE EVALUATION
Post-Op Assessment Note    CV Status:  Stable  Pain Score: 0    Pain management: adequate    Multimodal analgesia used between 6 hours prior to anesthesia start to PACU discharge    Mental Status:  Sleepy   Hydration Status:  Euvolemic   PONV Controlled:  Controlled   Airway Patency:  Patent     Post Op Vitals Reviewed: Yes    No anethesia notable event occurred.    Staff: CRNA, Anesthesiologist   Comments: vss sv nonobstructed uneventful          Last Filed PACU Vitals:  Vitals Value Taken Time   Temp 98.8    Pulse 100    /66    Resp 22    SpO2 98        Modified Nelson:     Vitals Value Taken Time   Activity 2 02/19/25 1230   Respiration 2 02/19/25 1230   Circulation 2 02/19/25 1230   Consciousness 2 02/19/25 1230   Oxygen Saturation 2 02/19/25 1230     Modified Nelson Score: 10

## 2025-02-20 NOTE — PLAN OF CARE
Problem: PAIN - ADULT  Goal: Verbalizes/displays adequate comfort level or baseline comfort level  Description: Interventions:  - Encourage patient to monitor pain and request assistance  - Assess pain using appropriate pain scale  - Administer analgesics based on type and severity of pain and evaluate response  - Implement non-pharmacological measures as appropriate and evaluate response  - Consider cultural and social influences on pain and pain management  - Notify physician/advanced practitioner if interventions unsuccessful or patient reports new pain  Outcome: Progressing     Problem: INFECTION - ADULT  Goal: Absence or prevention of progression during hospitalization  Description: INTERVENTIONS:  - Assess and monitor for signs and symptoms of infection  - Monitor lab/diagnostic results  - Monitor all insertion sites, i.e. indwelling lines, tubes, and drains  - Monitor endotracheal if appropriate and nasal secretions for changes in amount and color  - [place] appropriate cooling/warming therapies per order  - Administer medications as ordered  - Instruct and encourage patient and family to use good hand hygiene technique  - Identify and instruct in appropriate isolation precautions for identified infection/condition  Outcome: Progressing  Goal: Absence of fever/infection during neutropenic period  Description: INTERVENTIONS:  - Monitor WBC    Outcome: Progressing     Problem: SAFETY ADULT  Goal: Patient will remain free of falls  Description: INTERVENTIONS:  - Educate patient/family on patient safety including physical limitations  - Instruct patient to call for assistance with activity   - Consult OT/PT to assist with strengthening/mobility   - Keep Call bell within reach  - Keep bed low and locked with side rails adjusted as appropriate  - Keep care items and personal belongings within reach  - Initiate and maintain comfort rounds  - Make Fall Risk Sign visible to staf  - Apply yellow socks and bracelet  for high fall risk patients  - Consider moving patient to room near nurses station  Outcome: Progressing  Goal: Maintain or return to baseline ADL function  Description: INTERVENTIONS:  -  Assess patient's ability to carry out ADLs; assess patient's baseline for ADL function and identify physical deficits which impact ability to perform ADLs (bathing, care of mouth/teeth, toileting, grooming, dressing, etc.)  - Assess/evaluate cause of self-care deficits   - Assess range of motion  - Assess patient's mobility; develop plan if impaired  - Assess patient's need for assistive devices and provide as appropriate  - Encourage maximum independence but intervene and supervise when necessary  - Involve family in performance of ADLs  - Assess for home care needs following discharge   - Consider OT consult to assist with ADL evaluation and planning for discharge  - Provide patient education as appropriate  Outcome: Progressing  Goal: Maintains/Returns to pre admission functional level  Description: INTERVENTIONS:  - Perform AM-PAC 6 Click Basic Mobility/ Daily Activity assessment daily.  - Set and communicate daily mobility goal to care team and patient/family/caregiver.   - Collaborate with rehabilitation services on mobility goals if consulted  - Out of bed for toileting  - Record patient progress and toleration of activity level   Outcome: Progressing     Problem: DISCHARGE PLANNING  Goal: Discharge to home or other facility with appropriate resources  Description: INTERVENTIONS:  - Identify barriers to discharge w/patient and caregiver  - Arrange for needed discharge resources and transportation as appropriate  - Identify discharge learning needs (meds, wound care, etc.)  - Arrange for interpretive services to assist at discharge as needed  - Refer to Case Management Department for coordinating discharge planning if the patient needs post-hospital services based on physician/advanced practitioner order or complex needs  related to functional status, cognitive ability, or social support system  Outcome: Progressing     Problem: DISCHARGE PLANNING  Goal: Discharge to home or other facility with appropriate resources  Description: INTERVENTIONS:  - Identify barriers to discharge w/patient and caregiver  - Arrange for needed discharge resources and transportation as appropriate  - Identify discharge learning needs (meds, wound care, etc.)  - Arrange for interpretive services to assist at discharge as needed  - Refer to Case Management Department for coordinating discharge planning if the patient needs post-hospital services based on physician/advanced practitioner order or complex needs related to functional status, cognitive ability, or social support system  Outcome: Progressing     Problem: Knowledge Deficit  Goal: Patient/family/caregiver demonstrates understanding of disease process, treatment plan, medications, and discharge instructions  Description: Complete learning assessment and assess knowledge base.  Interventions:  - Provide teaching at level of understanding  - Provide teaching via preferred learning methods  Outcome: Progressing

## 2025-02-20 NOTE — ASSESSMENT & PLAN NOTE
S/p EUA, RA-lap myomectomy.   Hgb 13 --> 10.9  Pain: sched tylenol/toradol, prn robaxin, jerri - plan to transition to PO meds today now that n/v improving  Abdominal binder prn, ice to incisions   Jones catheter removed POD#0, voiding without difficulty. Pyridium prn for dysuria  FEN: advance diet today as tolerate  DVT ppx: SCDs  Encourage ambulation  Encourage incentive spirometry

## 2025-02-20 NOTE — PROGRESS NOTES
Progress Note - OB/GYN   Name: Shruthi Alcantar 35 y.o. female I MRN: [id number]  Unit/Bed#: -01 I Date of Admission: 2/19/2025   Date of Service: 2/20/2025 I Hospital Day: 0     Assessment & Plan  Fibroids  S/p EUA, RA-lap myomectomy.   Hgb 13 --> 10.9  Pain: sched tylenol/toradol, prn robaxin, jerri - plan to transition to PO meds today now that n/v improving  Abdominal binder prn, ice to incisions   Jones catheter removed POD#0, voiding without difficulty. Pyridium prn for dysuria  FEN: advance diet today as tolerate  DVT ppx: SCDs  Encourage ambulation  Encourage incentive spirometry   Menorrhagia    Intramural and subserous leiomyoma of uterus    Pelvic pain in female    Postoperative nausea and vomiting  Improved this AM, last emesis at 0200  S/p droperidol  Advance diet as tolerate    OB L&D Progress Note  Subjective   Patient reports feeling much better this AM. N/V has overall resolved with last emesis at 0200. She has tolerated clear liquids, has not yet tried solid foods, but has breakfast ordered. Pain is overall controlled with current regimen. Voiding without difficulty. Denies chest pain, shortness of breath, abdominal pain, leg tenderness/swelling    Objective :  Temp:  [98.2 °F (36.8 °C)-99.1 °F (37.3 °C)] 99.1 °F (37.3 °C)  HR:  [] 78  BP: (107-145)/(52-77) 107/52  Resp:  [16-20] 16  SpO2:  [95 %-100 %] 95 %  O2 Device: None (Room air)    Physical Exam  Vitals and nursing note reviewed.   Constitutional:       General: She is not in acute distress.     Appearance: Normal appearance. She is well-developed. She is not ill-appearing, toxic-appearing or diaphoretic.   HENT:      Head: Normocephalic and atraumatic.   Eyes:      General: No scleral icterus.     Conjunctiva/sclera: Conjunctivae normal.   Cardiovascular:      Rate and Rhythm: Normal rate and regular rhythm.      Heart sounds: No murmur heard.  Pulmonary:      Effort: Pulmonary effort is normal. No respiratory distress.       Breath sounds: Normal breath sounds.   Abdominal:      General: Abdomen is flat.      Palpations: Abdomen is soft.      Tenderness: There is no abdominal tenderness.      Comments: Some ecchymosis surrounding incisions. Incisions c/d/i   Musculoskeletal:         General: No swelling.      Cervical back: Neck supple.   Skin:     General: Skin is warm and dry.      Capillary Refill: Capillary refill takes less than 2 seconds.   Neurological:      General: No focal deficit present.      Mental Status: She is alert and oriented to person, place, and time.   Psychiatric:         Mood and Affect: Mood normal.         Behavior: Behavior normal.           Lab Results: I have reviewed the following results:CBC/BMP:   .     02/20/25  0615   WBC 14.43*   HGB 10.9*   HCT 32.2*      SODIUM 136   K 3.6      CO2 23   BUN 6   CREATININE 0.56*   GLUC 107   MG 1.8*

## 2025-02-24 PROCEDURE — 88305 TISSUE EXAM BY PATHOLOGIST: CPT | Performed by: SPECIALIST

## 2025-02-26 ENCOUNTER — OFFICE VISIT (OUTPATIENT)
Dept: OBGYN CLINIC | Facility: CLINIC | Age: 35
End: 2025-02-26

## 2025-02-26 VITALS — SYSTOLIC BLOOD PRESSURE: 132 MMHG | DIASTOLIC BLOOD PRESSURE: 82 MMHG | WEIGHT: 179.6 LBS | BODY MASS INDEX: 28.99 KG/M2

## 2025-02-26 DIAGNOSIS — Z09 POSTOPERATIVE EXAMINATION: Primary | ICD-10-CM

## 2025-02-26 PROCEDURE — 99024 POSTOP FOLLOW-UP VISIT: CPT | Performed by: OBSTETRICS & GYNECOLOGY

## 2025-02-26 NOTE — PROGRESS NOTES
Name: Shruthi Alcantar      : 1990      MRN: [id number]  Encounter Provider: Bisi Gomez MD  Encounter Date: 2025   Encounter department: [facility]  :  Assessment & Plan  Postoperative examination  Healing as expected. Abdominal soreness is improving. Slow increase of activity.  Recommend 6 weeks post op recovery prior to return to work due to nature of job. Abdominal wall needs more time to heal before removing restrictions.            History of Present Illness   1 week post op myomectomy pathology reviewed 309gm fibroids.    Postop nausea severe - resolved after multiple medications and 16 hours after surgery    Menses started this week as well.   Use of ibuprofen and acetaminophen for pain  More mobile around home  Normal bowel and bladder symptoms      Gynecologic Exam  She complains of pelvic pain and vaginal bleeding. She reports no genital itching, genital lesions, genital odor, genital rash or vaginal discharge. Pertinent negatives include no chills, constipation, diarrhea, fever, nausea, sore throat or vomiting.     Shruthi Alcantar is a 35 y.o. female who presents for post op follow up. She is s/p myomectomy 25. No issues to report. Period started . Still bleeding.       Review of Systems   Constitutional:  Negative for activity change, appetite change, chills, fatigue and fever.   HENT:  Negative for rhinorrhea, sneezing and sore throat.    Eyes:  Negative for visual disturbance.   Respiratory:  Negative for cough, shortness of breath and wheezing.    Cardiovascular:  Negative for chest pain, palpitations and leg swelling.   Gastrointestinal:  Negative for abdominal distention, constipation, diarrhea, nausea and vomiting.   Genitourinary:  Positive for pelvic pain. Negative for difficulty urinating and vaginal discharge.   Neurological:  Negative for syncope and light-headedness.          Objective   /82 (BP Location:  Left arm, Patient Position: Sitting, Cuff Size: Standard)   Wt 81.5 kg (179 lb 9.6 oz)   LMP 02/22/2025 (Exact Date)   BMI 28.99 kg/m²      Physical Exam  Abdominal:      Comments: INC C/D/I  some ecchymosis lateral incisions   Genitourinary:     Comments: Introitus laceration healing

## 2025-03-02 DIAGNOSIS — F41.9 ANXIETY: ICD-10-CM

## 2025-03-04 RX ORDER — BUSPIRONE HYDROCHLORIDE 10 MG/1
10 TABLET ORAL 3 TIMES DAILY
Qty: 270 TABLET | Refills: 1 | Status: SHIPPED | OUTPATIENT
Start: 2025-03-04

## 2025-03-06 ENCOUNTER — OFFICE VISIT (OUTPATIENT)
Dept: GASTROENTEROLOGY | Facility: CLINIC | Age: 35
End: 2025-03-06
Payer: COMMERCIAL

## 2025-03-06 VITALS
BODY MASS INDEX: 29.21 KG/M2 | DIASTOLIC BLOOD PRESSURE: 70 MMHG | TEMPERATURE: 97.6 F | WEIGHT: 181 LBS | SYSTOLIC BLOOD PRESSURE: 104 MMHG

## 2025-03-06 DIAGNOSIS — K21.9 GASTROESOPHAGEAL REFLUX DISEASE WITHOUT ESOPHAGITIS: Primary | ICD-10-CM

## 2025-03-06 PROCEDURE — 99213 OFFICE O/P EST LOW 20 MIN: CPT | Performed by: PHYSICIAN ASSISTANT

## 2025-03-06 NOTE — PROGRESS NOTES
Name: Shruthi Alcantar      : 1990      MRN: [id number]  Encounter Provider: Lorrie Bartholomew PA-C  Encounter Date: 3/6/2025   Encounter department: [facility] [place]  :  Assessment & Plan  Gastroesophageal reflux disease without esophagitis  EGD this past May 2024  was WNL with gastric bx negative for H.pylori, showing reactive gastritis and duodenal bx negative for celiac.  At the time of her last office visit, the patient wanted to stay on omeprazole.  Today: She says she is still doing well on omeprazole but would be willing to try taking it less than she is now.  She said that she does not want to come off of it completely, rather would like to decrease the frequency.  -Decrease omeprazole 20 mg to every other day: I explained to the patient that if she is having trouble doing this, she can go back to taking it daily and should let us know           History of Present Illness   HPI  Shruthi Alcantar is a 35 y.o. female who presents for follow-up.  The patient says she has still been doing well on omeprazole daily.  She said that she would be willing to change how often she is taking this but does not want to get off of it completely yet.  She denies abdominal pain, heartburn or reflux, nausea or vomiting, trouble swallowing or eating, constipation, diarrhea, unintentional weight loss, fevers, chills, night sweats, bloody or black bowel movements.  History obtained from: patient    Review of Systems   Constitutional:  Negative for chills and fever.   Gastrointestinal:  Negative for abdominal distention, abdominal pain, anal bleeding, blood in stool, constipation, diarrhea, nausea, rectal pain and vomiting.   Skin:  Negative for color change and rash.   All other systems reviewed and are negative.    Medical History Reviewed by provider this encounter:     .  Past Medical History   Past Medical History:   Diagnosis Date    Anxiety 10/2023    GERD (gastroesophageal  reflux disease)     Headache(784.0)     Middle school    Irritable bowel syndrome     Migraine     Otitis media     Varicella     Visual impairment     Wears contacts     Past Surgical History:   Procedure Laterality Date    MYOMECTOMY  02/19/2025    MT LAPS MYOMECTOMY EXC 1-4 MYOMAS 250 GM/< Bilateral 2/19/2025    Procedure: ROBOTIC ASSISTED LAPAROSCOPIC MYOMECTOMY, EXAM UNDER ANESTHESIA;  Surgeon: Bisi Gomez MD;  Location: [facility];  Service: Gynecology    WISDOM TOOTH EXTRACTION       Family History   Problem Relation Age of Onset    Hyperlipidemia Mother     Cancer Mother         Breast; leukemia    Osteoporosis Mother     Hypertension Father     Heart attack Maternal Grandmother     Heart attack Maternal Grandfather     Cancer Paternal Grandmother     Hypertension Maternal Aunt     Hyperlipidemia Maternal Aunt     Hyperlipidemia Maternal Aunt     Hypertension Maternal Uncle     Lupus Family       reports that she has never smoked. She has never used smokeless tobacco. She reports current alcohol use. She reports that she does not use drugs.  Current Outpatient Medications   Medication Instructions    acetaminophen (TYLENOL) 500 mg, Every 6 hours PRN    busPIRone (BUSPAR) 10 mg, Oral, 3 times daily    Co Q-10 50 mg    ibuprofen (MOTRIN) 800 mg, Oral, Every 8 hours PRN    methocarbamol (ROBAXIN) 500 mg, Oral, 4 times daily    Multiple Vitamins-Minerals (MULTI ADULT GUMMIES PO) Daily    omeprazole (PRILOSEC) 20 mg, Oral, Daily, 30 minutes before breakfast    phenazopyridine (PYRIDIUM) 100 mg, Oral, 3 times daily PRN    sertraline (ZOLOFT) 25 mg, Oral, Daily    SUMAtriptan (IMITREX) 50 mg, Oral, Once as needed    Vitamin D 2,000 Units, Daily   No Known Allergies   Current Outpatient Medications on File Prior to Visit   Medication Sig Dispense Refill    acetaminophen (TYLENOL) 500 mg tablet Take 500 mg by mouth every 6 (six) hours as needed for mild pain      busPIRone (BUSPAR) 10 mg tablet Take 1  tablet (10 mg total) by mouth 3 (three) times a day 270 tablet 1    Cholecalciferol (Vitamin D) 50 MCG (2000 UT) tablet Take 2,000 Units by mouth daily      Co Q-10 50 MG Take 50 mg by mouth      ibuprofen (MOTRIN) 800 mg tablet Take 1 tablet (800 mg total) by mouth every 8 (eight) hours as needed for mild pain or moderate pain 30 tablet 1    methocarbamol (ROBAXIN) 500 mg tablet Take 1 tablet (500 mg total) by mouth 4 (four) times a day 30 tablet 0    Multiple Vitamins-Minerals (MULTI ADULT GUMMIES PO) Take by mouth daily      omeprazole (PriLOSEC) 20 mg delayed release capsule Take 1 capsule (20 mg total) by mouth daily 30 minutes before breakfast 90 capsule 1    phenazopyridine (PYRIDIUM) 100 mg tablet Take 1 tablet (100 mg total) by mouth 3 (three) times a day as needed for bladder spasms 10 tablet 0    sertraline (ZOLOFT) 25 mg tablet Take 1 tablet (25 mg total) by mouth daily 90 tablet 0    SUMAtriptan (IMITREX) 50 mg tablet Take 1 tablet (50 mg total) by mouth once as needed for migraine for up to 1 dose 9 tablet 0     No current facility-administered medications on file prior to visit.      Social History     Tobacco Use    Smoking status: Never    Smokeless tobacco: Never   Vaping Use    Vaping status: Never Used   Substance and Sexual Activity    Alcohol use: Yes     Comment: Once a month    Drug use: Never    Sexual activity: Never        Objective   LMP 02/22/2025 (Exact Date)      Physical Exam  Constitutional:       Appearance: Normal appearance.   Cardiovascular:      Rate and Rhythm: Normal rate and regular rhythm.   Pulmonary:      Breath sounds: Normal breath sounds.   Abdominal:      General: Bowel sounds are normal. There is no distension.      Palpations: There is no mass.      Tenderness: There is no abdominal tenderness. There is no guarding or rebound.   Neurological:      Mental Status: She is alert.         Administrative Statements   I have spent a total time of 30 minutes in caring for  this patient on the day of the visit/encounter including Diagnostic results, Prognosis, Risks and benefits of tx options, Instructions for management, Patient and family education, Importance of tx compliance, Risk factor reductions, Impressions, Counseling / Coordination of care, Documenting in the medical record, Reviewing/placing orders in the medical record (including tests, medications, and/or procedures), Obtaining or reviewing history  , and Communicating with other healthcare professionals .

## 2025-03-06 NOTE — PATIENT INSTRUCTIONS
For the next month or so, please change how you are taking the omeprazole to every other day. Please send me a message after you do this to let us know how you are feeling. If you do not do well, you can go back to daily.

## 2025-03-17 ENCOUNTER — OFFICE VISIT (OUTPATIENT)
Dept: OBGYN CLINIC | Facility: CLINIC | Age: 35
End: 2025-03-17

## 2025-03-17 VITALS — WEIGHT: 182 LBS | DIASTOLIC BLOOD PRESSURE: 74 MMHG | SYSTOLIC BLOOD PRESSURE: 112 MMHG | BODY MASS INDEX: 29.38 KG/M2

## 2025-03-17 DIAGNOSIS — Z09 POSTOPERATIVE EXAMINATION: Primary | ICD-10-CM

## 2025-03-17 DIAGNOSIS — F41.9 ANXIETY: ICD-10-CM

## 2025-03-17 PROCEDURE — 99024 POSTOP FOLLOW-UP VISIT: CPT | Performed by: OBSTETRICS & GYNECOLOGY

## 2025-03-17 RX ORDER — SERTRALINE HYDROCHLORIDE 25 MG/1
25 TABLET, FILM COATED ORAL DAILY
Qty: 90 TABLET | Refills: 1 | Status: SHIPPED | OUTPATIENT
Start: 2025-03-17 | End: 2026-03-12

## 2025-03-17 NOTE — PROGRESS NOTES
Name: Shruthi Alcantar      : 1990      MRN: [id number]  Encounter Provider: Bisi Gomez MD  Encounter Date: 3/17/2025   Encounter department: [facility]  :  Assessment & Plan  Postoperative examination  Healing well. Plan to increase activity, continue healthy eating and hydration. Plan return to work 2025.  Follow up annual with Dr. Estrella           History of Present Illness   HPI  Shruthi Alcantar is a 35 y.o. female who presents for her second post op follow up. She is s/p myomectomy 25. Feeling overall well. Some pain on lower R side of pelvis and radiates to back. Comes and goes.       Review of Systems       Objective   /74 (BP Location: Left arm, Patient Position: Sitting, Cuff Size: Standard)   Wt 82.6 kg (182 lb)   LMP 2025 (Exact Date)   BMI 29.38 kg/m²      Physical Exam  Abdominal:      General: Abdomen is flat. Bowel sounds are normal.      Tenderness: There is no abdominal tenderness.      Comments: INC c/d/i   Genitourinary:     Comments: Perineum laceration healed

## 2025-03-17 NOTE — LETTER
March 17, 2025     Patient: Shruthi Alcantar   YOB: 1990   Date of Visit: 3/17/2025       To Whom It May Concern:    It is my medical opinion that Shruthi Alcantar may return to full duty immediately with no restrictions on April 4, 2025.    If you have any questions or concerns, please don't hesitate to call.         Sincerely,        Bisi Gomez MD    CC: No Recipients

## 2025-05-05 ENCOUNTER — ANNUAL EXAM (OUTPATIENT)
Dept: OBGYN CLINIC | Facility: CLINIC | Age: 35
End: 2025-05-05
Payer: COMMERCIAL

## 2025-05-05 VITALS
BODY MASS INDEX: 29.35 KG/M2 | DIASTOLIC BLOOD PRESSURE: 70 MMHG | WEIGHT: 182.6 LBS | SYSTOLIC BLOOD PRESSURE: 120 MMHG | HEIGHT: 66 IN

## 2025-05-05 DIAGNOSIS — Z01.419 ENCOUNTER FOR ANNUAL ROUTINE GYNECOLOGICAL EXAMINATION: Primary | ICD-10-CM

## 2025-05-05 DIAGNOSIS — Z98.890 HISTORY OF MYOMECTOMY: ICD-10-CM

## 2025-05-05 PROCEDURE — S0612 ANNUAL GYNECOLOGICAL EXAMINA: HCPCS | Performed by: OBSTETRICS & GYNECOLOGY

## 2025-05-05 RX ORDER — MELOXICAM 15 MG/1
1 TABLET ORAL DAILY
COMMUNITY
Start: 2025-04-23

## 2025-05-05 NOTE — PROGRESS NOTES
Name: Shruthi Alcantar      : 1990      MRN: [id number]  Encounter Provider: Lory Estrella DO  Encounter Date: 2025   Encounter department: [facility]  :  Assessment & Plan  Encounter for annual routine gynecological examination         History of myomectomy       Pap smear deferred due to low risk status.  Encouraged self breast examination as well as calcium supplementation.  Reviewed breast cancer screening starting at age 40 with baseline mammogram.      History of Present Illness   HPI  Shruthi Alcantar is a 35 y.o. female who presents     This is a pleasant 35-year-old female G0 presents for her GYN exam.  Her GYN history significant for multiple myomectomy, robotic assisted 2025 removing 5 large fibroids.  Her cycles are regular every 4 weeks lasting 5 days with no breakthrough bleeding.  There is no changes in bowel or bladder function.  She does have left pressure noted.  She did receive the Gardasil vaccine.  Patient has never been sexually active.    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  Patient aware should not be getting pregnant over the year due to myomectomies.    History obtained from: patient    Review of Systems   Constitutional:  Negative for fatigue, fever and unexpected weight change.   Respiratory:  Negative for cough, chest tightness, shortness of breath and wheezing.    Cardiovascular: Negative.  Negative for chest pain and palpitations.   Gastrointestinal: Negative.  Negative for abdominal distention, abdominal pain, blood in stool, constipation, diarrhea, nausea and vomiting.   Genitourinary: Negative.  Negative for difficulty urinating, dyspareunia, dysuria, flank pain, frequency, genital sores, hematuria, pelvic pain, urgency, vaginal bleeding, vaginal discharge and vaginal pain.   Skin:  Negative for rash.     Current  "Outpatient Medications on File Prior to Visit   Medication Sig Dispense Refill    acetaminophen (TYLENOL) 500 mg tablet Take 500 mg by mouth every 6 (six) hours as needed for mild pain      busPIRone (BUSPAR) 10 mg tablet Take 1 tablet (10 mg total) by mouth 3 (three) times a day 270 tablet 1    Cholecalciferol (Vitamin D) 50 MCG (2000 UT) tablet Take 2,000 Units by mouth daily      Co Q-10 50 MG Take 50 mg by mouth      ibuprofen (MOTRIN) 800 mg tablet Take 1 tablet (800 mg total) by mouth every 8 (eight) hours as needed for mild pain or moderate pain 30 tablet 1    Multiple Vitamins-Minerals (MULTI ADULT GUMMIES PO) Take by mouth daily      omeprazole (PriLOSEC) 20 mg delayed release capsule Take 1 capsule (20 mg total) by mouth daily 30 minutes before breakfast 90 capsule 1    sertraline (ZOLOFT) 25 mg tablet Take 1 tablet (25 mg total) by mouth daily 90 tablet 1    meloxicam (MOBIC) 15 mg tablet Take 1 tablet by mouth in the morning (Patient not taking: Reported on 5/5/2025)      methocarbamol (ROBAXIN) 500 mg tablet Take 1 tablet (500 mg total) by mouth 4 (four) times a day (Patient not taking: Reported on 5/5/2025) 30 tablet 0    phenazopyridine (PYRIDIUM) 100 mg tablet Take 1 tablet (100 mg total) by mouth 3 (three) times a day as needed for bladder spasms (Patient not taking: Reported on 3/17/2025) 10 tablet 0    SUMAtriptan (IMITREX) 50 mg tablet Take 1 tablet (50 mg total) by mouth once as needed for migraine for up to 1 dose (Patient not taking: Reported on 5/5/2025) 9 tablet 0     No current facility-administered medications on file prior to visit.         Objective   /70   Ht 5' 6\" (1.676 m)   Wt 82.8 kg (182 lb 9.6 oz)   LMP 04/14/2025 (Exact Date)   Breastfeeding Unknown   BMI 29.47 kg/m²      Physical Exam  Constitutional:       Appearance: Normal appearance. She is well-developed.   HENT:      Head: Normocephalic and atraumatic.   Cardiovascular:      Rate and Rhythm: Normal rate and " regular rhythm.   Pulmonary:      Effort: Pulmonary effort is normal.      Breath sounds: Normal breath sounds.   Chest:   Breasts:     Right: No inverted nipple, mass, nipple discharge, skin change or tenderness.      Left: No inverted nipple, mass, nipple discharge, skin change or tenderness.   Abdominal:      General: Bowel sounds are normal. There is no distension.      Palpations: Abdomen is soft.      Tenderness: There is no abdominal tenderness. There is no guarding or rebound.   Genitourinary:     Labia:         Right: No rash, tenderness or lesion.         Left: No rash, tenderness or lesion.       Vagina: Normal. No signs of injury. No vaginal discharge or tenderness.      Cervix: No cervical motion tenderness, discharge, friability, lesion or cervical bleeding.      Uterus: Not enlarged and not tender.       Adnexa:         Right: No mass, tenderness or fullness.          Left: No mass, tenderness or fullness.     Neurological:      Mental Status: She is alert.   Psychiatric:         Behavior: Behavior normal.         Administrative Statements   I have spent a total time of 25 minutes in caring for this patient on the day of the visit/encounter including Risks and benefits of tx options, Instructions for management, Impressions, Counseling / Coordination of care, Documenting in the medical record, Reviewing/placing orders in the medical record (including tests, medications, and/or procedures), and Obtaining or reviewing history  .

## 2025-05-06 DIAGNOSIS — Z00.6 ENCOUNTER FOR EXAMINATION FOR NORMAL COMPARISON OR CONTROL IN CLINICAL RESEARCH PROGRAM: ICD-10-CM

## 2025-06-01 DIAGNOSIS — K21.9 GASTROESOPHAGEAL REFLUX DISEASE, UNSPECIFIED WHETHER ESOPHAGITIS PRESENT: ICD-10-CM

## 2025-06-01 DIAGNOSIS — R10.13 EPIGASTRIC PAIN: ICD-10-CM

## 2025-06-02 RX ORDER — OMEPRAZOLE 20 MG/1
20 CAPSULE, DELAYED RELEASE ORAL DAILY
Qty: 30 CAPSULE | Refills: 5 | Status: SHIPPED | OUTPATIENT
Start: 2025-06-02

## 2025-07-05 ENCOUNTER — APPOINTMENT (OUTPATIENT)
Dept: LAB | Facility: CLINIC | Age: 35
End: 2025-07-05
Attending: PREVENTIVE MEDICINE

## 2025-07-05 ENCOUNTER — APPOINTMENT (OUTPATIENT)
Dept: LAB | Facility: CLINIC | Age: 35
End: 2025-07-05
Attending: PATHOLOGY

## 2025-07-05 DIAGNOSIS — Z00.6 ENCOUNTER FOR EXAMINATION FOR NORMAL COMPARISON OR CONTROL IN CLINICAL RESEARCH PROGRAM: ICD-10-CM

## 2025-07-05 DIAGNOSIS — Z00.8 ENCOUNTER FOR OTHER GENERAL EXAMINATION: ICD-10-CM

## 2025-07-05 LAB
CHOLEST SERPL-MCNC: 224 MG/DL (ref ?–200)
EST. AVERAGE GLUCOSE BLD GHB EST-MCNC: 97 MG/DL
HBA1C MFR BLD: 5 %
HDLC SERPL-MCNC: 64 MG/DL
LDLC SERPL CALC-MCNC: 142 MG/DL (ref 0–100)
NONHDLC SERPL-MCNC: 160 MG/DL
TRIGL SERPL-MCNC: 88 MG/DL (ref ?–150)

## 2025-07-05 PROCEDURE — 83036 HEMOGLOBIN GLYCOSYLATED A1C: CPT

## 2025-07-05 PROCEDURE — 36415 COLL VENOUS BLD VENIPUNCTURE: CPT

## 2025-07-05 PROCEDURE — 80061 LIPID PANEL: CPT

## 2025-07-18 LAB
APOB+LDLR+PCSK9 GENE MUT ANL BLD/T: NOT DETECTED
BRCA1+BRCA2 DEL+DUP + FULL MUT ANL BLD/T: NOT DETECTED
MLH1+MSH2+MSH6+PMS2 GN DEL+DUP+FUL M: NOT DETECTED

## 2025-08-13 ENCOUNTER — HOSPITAL ENCOUNTER (OUTPATIENT)
Dept: RADIOLOGY | Facility: HOSPITAL | Age: 35
Discharge: HOME | End: 2025-08-13
Payer: COMMERCIAL

## 2025-08-13 ENCOUNTER — APPOINTMENT (OUTPATIENT)
Dept: LAB | Facility: CLINIC | Age: 35
End: 2025-08-13
Attending: INTERNAL MEDICINE
Payer: COMMERCIAL

## 2025-08-13 DIAGNOSIS — E04.1 THYROID NODULE: ICD-10-CM

## 2025-08-13 DIAGNOSIS — R74.8 LOW SERUM ALKALINE PHOSPHATASE: ICD-10-CM

## 2025-08-13 DIAGNOSIS — E55.9 VITAMIN D DEFICIENCY: ICD-10-CM

## 2025-08-13 DIAGNOSIS — E06.3 HASHIMOTO'S THYROIDITIS: ICD-10-CM

## 2025-08-13 LAB
25(OH)D3 SERPL-MCNC: 41.1 NG/ML (ref 30–100)
ALBUMIN SERPL BCG-MCNC: 4.4 G/DL (ref 3.5–5)
ALP SERPL-CCNC: 45 U/L (ref 34–104)
ALT SERPL W P-5'-P-CCNC: 15 U/L (ref 7–52)
ANION GAP SERPL CALCULATED.3IONS-SCNC: 10 MMOL/L (ref 4–13)
AST SERPL W P-5'-P-CCNC: 15 U/L (ref 13–39)
BILIRUB SERPL-MCNC: 0.38 MG/DL (ref 0.2–1)
BUN SERPL-MCNC: 10 MG/DL (ref 5–25)
CALCIUM SERPL-MCNC: 9.3 MG/DL (ref 8.4–10.2)
CHLORIDE SERPL-SCNC: 104 MMOL/L (ref 96–108)
CO2 SERPL-SCNC: 21 MMOL/L (ref 21–32)
CREAT SERPL-MCNC: 0.62 MG/DL (ref 0.6–1.3)
GFR SERPL CREATININE-BSD FRML MDRD: 116 ML/MIN/1.73SQ M
GLUCOSE P FAST SERPL-MCNC: 85 MG/DL (ref 65–99)
POTASSIUM SERPL-SCNC: 4 MMOL/L (ref 3.5–5.3)
PROT SERPL-MCNC: 7.7 G/DL (ref 6.4–8.4)
PTH-INTACT SERPL-MCNC: 19.9 PG/ML (ref 12–88)
SODIUM SERPL-SCNC: 135 MMOL/L (ref 135–147)
T4 FREE SERPL-MCNC: 0.79 NG/DL (ref 0.61–1.12)
TSH SERPL DL<=0.05 MIU/L-ACNC: 1.15 UIU/ML (ref 0.45–4.5)

## 2025-08-13 PROCEDURE — 36415 COLL VENOUS BLD VENIPUNCTURE: CPT

## 2025-08-13 PROCEDURE — 80053 COMPREHEN METABOLIC PANEL: CPT

## 2025-08-13 PROCEDURE — 84443 ASSAY THYROID STIM HORMONE: CPT

## 2025-08-13 PROCEDURE — 82306 VITAMIN D 25 HYDROXY: CPT

## 2025-08-13 PROCEDURE — 83970 ASSAY OF PARATHORMONE: CPT

## 2025-08-13 PROCEDURE — 84439 ASSAY OF FREE THYROXINE: CPT

## (undated) DEVICE — SEAL

## (undated) DEVICE — PREMIUM DRY TRAY LF: Brand: MEDLINE INDUSTRIES, INC.

## (undated) DEVICE — 1820 FOAM BLOCK NEEDLE COUNTER: Brand: DEVON

## (undated) DEVICE — 3000CC GUARDIAN II: Brand: GUARDIAN

## (undated) DEVICE — ELECTRO LUBE IS A SINGLE PATIENT USE DEVICE THAT IS INTENDED TO BE USED ON ELECTROSURGICAL ELECTRODES TO REDUCE STICKING.: Brand: KEY SURGICAL ELECTRO LUBE

## (undated) DEVICE — VESSEL SEALER EXTEND: Brand: ENDOWRIST

## (undated) DEVICE — KIT, BETHLEHEM ROBOTIC PROST: Brand: CARDINAL HEALTH

## (undated) DEVICE — BLADELESS OBTURATOR: Brand: WECK VISTA

## (undated) DEVICE — TRAY FOLEY 16FR URIMETER SILICONE SURESTEP

## (undated) DEVICE — SUT VICRYL PLUS 0 CT-1 27IN VCPP31D

## (undated) DEVICE — SURGICEL FIBRILLAR 1 X 2

## (undated) DEVICE — TIP COVER ACCESSORY

## (undated) DEVICE — 2, DISPOSABLE SUCTION/IRRIGATOR WITHOUT DISPOSABLE TIP: Brand: STRYKEFLOW

## (undated) DEVICE — TROCAR PORT ACCESS 12 X120MM W/BLDLS OPTICAL TIP AIRSEAL

## (undated) DEVICE — MAYO STAND COVER: Brand: CONVERTORS

## (undated) DEVICE — SUT STRATAFIX SPIRAL 2-0 CT-1 30 CM SXPP1B410

## (undated) DEVICE — LARGE NEEDLE DRIVER: Brand: ENDOWRIST

## (undated) DEVICE — PROGRASP FORCEPS: Brand: ENDOWRIST

## (undated) DEVICE — TROCAR: Brand: KII FIOS FIRST ENTRY

## (undated) DEVICE — MORCELLATOR LAP LINA XCISE 15 MM OBTURATOR CRDLS

## (undated) DEVICE — MONOPOLAR CURVED SCISSORS: Brand: ENDOWRIST

## (undated) DEVICE — CHLORHEXIDINE 4PCT 4 OZ

## (undated) DEVICE — EXOFIN PRECISION PEN HIGH VISCOSITY TOPICAL SKIN ADHESIVE: Brand: EXOFIN PRECISION PEN, 1G

## (undated) DEVICE — GLOVE INDICATOR PI UNDERGLOVE SZ 6.5 BLUE

## (undated) DEVICE — GLOVE PI ULTRA TOUCH SZ.6.5

## (undated) DEVICE — TISSUE RETRIEVAL SYSTEM: Brand: INZII RETRIEVAL SYSTEM

## (undated) DEVICE — ARM DRAPE

## (undated) DEVICE — INTERCEED

## (undated) DEVICE — VESSEL SEALER: Brand: ENDOWRIST;DAVINCI SI

## (undated) DEVICE — GLOVE INDICATOR PI UNDERGLOVE SZ 7 BLUE

## (undated) DEVICE — 40595 XL TRENDELENBURG POSITIONING KIT: Brand: 40595 XL TRENDELENBURG POSITIONING KIT

## (undated) DEVICE — SYRINGE 50ML LL

## (undated) DEVICE — AIRSEAL TUBE SMOKE EVAC LUMENX3 FILTERED

## (undated) DEVICE — COLUMN DRAPE

## (undated) DEVICE — INTENDED FOR TISSUE SEPARATION, AND OTHER PROCEDURES THAT REQUIRE A SHARP SURGICAL BLADE TO PUNCTURE OR CUT.: Brand: BARD-PARKER SAFETY BLADES SIZE 11, STERILE

## (undated) DEVICE — SUT MONOCRYL 4-0 PS-2 27 IN Y426H

## (undated) DEVICE — INTENDED FOR TISSUE SEPARATION, AND OTHER PROCEDURES THAT REQUIRE A SHARP SURGICAL BLADE TO PUNCTURE OR CUT.: Brand: BARD-PARKER SAFETY BLADES SIZE 15, STERILE